# Patient Record
Sex: FEMALE | Race: WHITE | NOT HISPANIC OR LATINO | Employment: OTHER | URBAN - METROPOLITAN AREA
[De-identification: names, ages, dates, MRNs, and addresses within clinical notes are randomized per-mention and may not be internally consistent; named-entity substitution may affect disease eponyms.]

---

## 2017-01-23 ENCOUNTER — GENERIC CONVERSION - ENCOUNTER (OUTPATIENT)
Dept: OTHER | Facility: OTHER | Age: 82
End: 2017-01-23

## 2017-03-08 ENCOUNTER — ALLSCRIPTS OFFICE VISIT (OUTPATIENT)
Dept: OTHER | Facility: OTHER | Age: 82
End: 2017-03-08

## 2017-03-08 DIAGNOSIS — E03.9 HYPOTHYROIDISM: ICD-10-CM

## 2017-03-08 DIAGNOSIS — I73.9 PERIPHERAL VASCULAR DISEASE (HCC): ICD-10-CM

## 2017-03-08 DIAGNOSIS — I10 ESSENTIAL (PRIMARY) HYPERTENSION: ICD-10-CM

## 2017-03-08 DIAGNOSIS — I65.29 OCCLUSION AND STENOSIS OF UNSPECIFIED CAROTID ARTERY: ICD-10-CM

## 2017-03-21 ENCOUNTER — APPOINTMENT (OUTPATIENT)
Dept: LAB | Facility: HOSPITAL | Age: 82
End: 2017-03-21
Payer: MEDICARE

## 2017-03-21 DIAGNOSIS — I65.29 OCCLUSION AND STENOSIS OF UNSPECIFIED CAROTID ARTERY: ICD-10-CM

## 2017-03-21 DIAGNOSIS — E03.9 HYPOTHYROIDISM: ICD-10-CM

## 2017-03-21 DIAGNOSIS — I10 ESSENTIAL (PRIMARY) HYPERTENSION: ICD-10-CM

## 2017-03-21 DIAGNOSIS — I73.9 PERIPHERAL VASCULAR DISEASE (HCC): ICD-10-CM

## 2017-03-21 LAB
ALBUMIN SERPL BCP-MCNC: 3.6 G/DL (ref 3.5–5)
ALP SERPL-CCNC: 87 U/L (ref 46–116)
ALT SERPL W P-5'-P-CCNC: 29 U/L (ref 12–78)
ANION GAP SERPL CALCULATED.3IONS-SCNC: 6 MMOL/L (ref 4–13)
AST SERPL W P-5'-P-CCNC: 25 U/L (ref 5–45)
BASOPHILS # BLD AUTO: 0.02 THOUSANDS/ΜL (ref 0–0.1)
BASOPHILS NFR BLD AUTO: 0 % (ref 0–1)
BILIRUB SERPL-MCNC: 0.29 MG/DL (ref 0.2–1)
BUN SERPL-MCNC: 21 MG/DL (ref 5–25)
CALCIUM SERPL-MCNC: 9 MG/DL (ref 8.3–10.1)
CHLORIDE SERPL-SCNC: 103 MMOL/L (ref 100–108)
CHOLEST SERPL-MCNC: 279 MG/DL (ref 50–200)
CO2 SERPL-SCNC: 31 MMOL/L (ref 21–32)
CREAT SERPL-MCNC: 0.71 MG/DL (ref 0.6–1.3)
EOSINOPHIL # BLD AUTO: 0.14 THOUSAND/ΜL (ref 0–0.61)
EOSINOPHIL NFR BLD AUTO: 3 % (ref 0–6)
ERYTHROCYTE [DISTWIDTH] IN BLOOD BY AUTOMATED COUNT: 13.6 % (ref 11.6–15.1)
GFR SERPL CREATININE-BSD FRML MDRD: >60 ML/MIN/1.73SQ M
GLUCOSE P FAST SERPL-MCNC: 99 MG/DL (ref 65–99)
HCT VFR BLD AUTO: 38.3 % (ref 34.8–46.1)
HDLC SERPL-MCNC: 94 MG/DL (ref 40–60)
HGB BLD-MCNC: 12.4 G/DL (ref 11.5–15.4)
LDLC SERPL CALC-MCNC: 169 MG/DL (ref 0–100)
LYMPHOCYTES # BLD AUTO: 1.14 THOUSANDS/ΜL (ref 0.6–4.47)
LYMPHOCYTES NFR BLD AUTO: 24 % (ref 14–44)
MCH RBC QN AUTO: 29.4 PG (ref 26.8–34.3)
MCHC RBC AUTO-ENTMCNC: 32.4 G/DL (ref 31.4–37.4)
MCV RBC AUTO: 91 FL (ref 82–98)
MONOCYTES # BLD AUTO: 0.34 THOUSAND/ΜL (ref 0.17–1.22)
MONOCYTES NFR BLD AUTO: 7 % (ref 4–12)
NEUTROPHILS # BLD AUTO: 3.07 THOUSANDS/ΜL (ref 1.85–7.62)
NEUTS SEG NFR BLD AUTO: 66 % (ref 43–75)
PLATELET # BLD AUTO: 221 THOUSANDS/UL (ref 149–390)
PMV BLD AUTO: 12.6 FL (ref 8.9–12.7)
POTASSIUM SERPL-SCNC: 4.1 MMOL/L (ref 3.5–5.3)
PROT SERPL-MCNC: 7.5 G/DL (ref 6.4–8.2)
RBC # BLD AUTO: 4.22 MILLION/UL (ref 3.81–5.12)
SODIUM SERPL-SCNC: 140 MMOL/L (ref 136–145)
TRIGL SERPL-MCNC: 79 MG/DL
TSH SERPL DL<=0.05 MIU/L-ACNC: 1.94 UIU/ML (ref 0.36–3.74)
WBC # BLD AUTO: 4.71 THOUSAND/UL (ref 4.31–10.16)

## 2017-03-21 PROCEDURE — 80061 LIPID PANEL: CPT

## 2017-03-21 PROCEDURE — 36415 COLL VENOUS BLD VENIPUNCTURE: CPT

## 2017-03-21 PROCEDURE — 84443 ASSAY THYROID STIM HORMONE: CPT

## 2017-03-21 PROCEDURE — 85025 COMPLETE CBC W/AUTO DIFF WBC: CPT

## 2017-03-21 PROCEDURE — 80053 COMPREHEN METABOLIC PANEL: CPT

## 2017-04-07 ENCOUNTER — GENERIC CONVERSION - ENCOUNTER (OUTPATIENT)
Dept: OTHER | Facility: OTHER | Age: 82
End: 2017-04-07

## 2017-04-07 ENCOUNTER — HOSPITAL ENCOUNTER (OUTPATIENT)
Dept: NON INVASIVE DIAGNOSTICS | Facility: CLINIC | Age: 82
Discharge: HOME | End: 2017-04-07
Payer: MEDICARE

## 2017-04-07 DIAGNOSIS — I73.9 PERIPHERAL VASCULAR DISEASE (HCC): ICD-10-CM

## 2017-04-07 PROCEDURE — 93923 UPR/LXTR ART STDY 3+ LVLS: CPT

## 2017-04-07 PROCEDURE — 93925 LOWER EXTREMITY STUDY: CPT

## 2017-04-11 ENCOUNTER — ALLSCRIPTS OFFICE VISIT (OUTPATIENT)
Dept: OTHER | Facility: OTHER | Age: 82
End: 2017-04-11

## 2017-04-20 ENCOUNTER — GENERIC CONVERSION - ENCOUNTER (OUTPATIENT)
Dept: OTHER | Facility: OTHER | Age: 82
End: 2017-04-20

## 2017-06-20 ENCOUNTER — ALLSCRIPTS OFFICE VISIT (OUTPATIENT)
Dept: OTHER | Facility: OTHER | Age: 82
End: 2017-06-20

## 2017-06-20 ENCOUNTER — TRANSCRIBE ORDERS (OUTPATIENT)
Dept: ADMINISTRATIVE | Facility: HOSPITAL | Age: 82
End: 2017-06-20

## 2017-06-20 DIAGNOSIS — M25.311 INSTABILITY OF RIGHT SHOULDER JOINT: Primary | ICD-10-CM

## 2017-06-20 DIAGNOSIS — M25.311 OTHER INSTABILITY, RIGHT SHOULDER: ICD-10-CM

## 2017-06-27 ENCOUNTER — HOSPITAL ENCOUNTER (OUTPATIENT)
Dept: MRI IMAGING | Facility: HOSPITAL | Age: 82
Discharge: HOME | End: 2017-06-27
Payer: MEDICARE

## 2017-06-27 DIAGNOSIS — M25.311 OTHER INSTABILITY, RIGHT SHOULDER: ICD-10-CM

## 2017-06-27 PROCEDURE — 73221 MRI JOINT UPR EXTREM W/O DYE: CPT

## 2017-06-28 ENCOUNTER — GENERIC CONVERSION - ENCOUNTER (OUTPATIENT)
Dept: OTHER | Facility: OTHER | Age: 82
End: 2017-06-28

## 2017-07-05 ENCOUNTER — ALLSCRIPTS OFFICE VISIT (OUTPATIENT)
Dept: OTHER | Facility: OTHER | Age: 82
End: 2017-07-05

## 2017-07-28 ENCOUNTER — APPOINTMENT (OUTPATIENT)
Dept: RADIOLOGY | Facility: CLINIC | Age: 82
End: 2017-07-28
Payer: MEDICARE

## 2017-07-28 ENCOUNTER — ALLSCRIPTS OFFICE VISIT (OUTPATIENT)
Dept: OTHER | Facility: OTHER | Age: 82
End: 2017-07-28

## 2017-07-28 DIAGNOSIS — M25.511 PAIN IN RIGHT SHOULDER: ICD-10-CM

## 2017-07-28 PROCEDURE — 73030 X-RAY EXAM OF SHOULDER: CPT

## 2017-08-09 ENCOUNTER — APPOINTMENT (OUTPATIENT)
Dept: PHYSICAL THERAPY | Facility: CLINIC | Age: 82
End: 2017-08-09
Payer: MEDICARE

## 2017-08-09 PROCEDURE — 97161 PT EVAL LOW COMPLEX 20 MIN: CPT

## 2017-08-09 PROCEDURE — G8990 OTHER PT/OT CURRENT STATUS: HCPCS

## 2017-08-09 PROCEDURE — G8991 OTHER PT/OT GOAL STATUS: HCPCS

## 2017-08-09 PROCEDURE — 97110 THERAPEUTIC EXERCISES: CPT

## 2017-08-11 ENCOUNTER — APPOINTMENT (OUTPATIENT)
Dept: PHYSICAL THERAPY | Facility: CLINIC | Age: 82
End: 2017-08-11
Payer: MEDICARE

## 2017-08-11 PROCEDURE — 97140 MANUAL THERAPY 1/> REGIONS: CPT

## 2017-08-11 PROCEDURE — 97110 THERAPEUTIC EXERCISES: CPT

## 2017-08-15 ENCOUNTER — APPOINTMENT (OUTPATIENT)
Dept: PHYSICAL THERAPY | Facility: CLINIC | Age: 82
End: 2017-08-15
Payer: MEDICARE

## 2017-08-18 ENCOUNTER — APPOINTMENT (OUTPATIENT)
Dept: PHYSICAL THERAPY | Facility: CLINIC | Age: 82
End: 2017-08-18
Payer: MEDICARE

## 2017-08-21 ENCOUNTER — APPOINTMENT (EMERGENCY)
Dept: RADIOLOGY | Facility: HOSPITAL | Age: 82
End: 2017-08-21
Payer: MEDICARE

## 2017-08-21 ENCOUNTER — APPOINTMENT (EMERGENCY)
Dept: CT IMAGING | Facility: HOSPITAL | Age: 82
End: 2017-08-21
Payer: MEDICARE

## 2017-08-21 ENCOUNTER — HOSPITAL ENCOUNTER (EMERGENCY)
Facility: HOSPITAL | Age: 82
Discharge: HOME | End: 2017-08-21
Attending: EMERGENCY MEDICINE | Admitting: EMERGENCY MEDICINE
Payer: MEDICARE

## 2017-08-21 VITALS
DIASTOLIC BLOOD PRESSURE: 77 MMHG | TEMPERATURE: 98.8 F | WEIGHT: 124 LBS | RESPIRATION RATE: 16 BRPM | HEART RATE: 61 BPM | SYSTOLIC BLOOD PRESSURE: 144 MMHG | OXYGEN SATURATION: 97 %

## 2017-08-21 DIAGNOSIS — S05.11XA ORBITAL CONTUSION, RIGHT, INITIAL ENCOUNTER: ICD-10-CM

## 2017-08-21 DIAGNOSIS — S09.90XA CLOSED HEAD INJURY, INITIAL ENCOUNTER: Primary | ICD-10-CM

## 2017-08-21 PROCEDURE — 99284 EMERGENCY DEPT VISIT MOD MDM: CPT

## 2017-08-21 PROCEDURE — 70450 CT HEAD/BRAIN W/O DYE: CPT

## 2017-08-21 PROCEDURE — 70486 CT MAXILLOFACIAL W/O DYE: CPT

## 2017-08-21 RX ORDER — LEVOTHYROXINE SODIUM 0.05 MG/1
50 TABLET ORAL DAILY
COMMUNITY
End: 2018-01-24 | Stop reason: SDUPTHER

## 2017-08-21 RX ORDER — LATANOPROST 50 UG/ML
1 SOLUTION/ DROPS OPHTHALMIC
COMMUNITY

## 2017-08-21 RX ORDER — LISINOPRIL 20 MG/1
20 TABLET ORAL DAILY
COMMUNITY
End: 2018-01-30 | Stop reason: SDUPTHER

## 2017-08-21 RX ORDER — BRIMONIDINE TARTRATE 2 MG/ML
1 SOLUTION/ DROPS OPHTHALMIC 2 TIMES DAILY
COMMUNITY

## 2017-08-22 ENCOUNTER — APPOINTMENT (OUTPATIENT)
Dept: PHYSICAL THERAPY | Facility: CLINIC | Age: 82
End: 2017-08-22
Payer: MEDICARE

## 2017-08-22 PROCEDURE — 97110 THERAPEUTIC EXERCISES: CPT

## 2017-08-22 PROCEDURE — 97140 MANUAL THERAPY 1/> REGIONS: CPT

## 2017-08-25 ENCOUNTER — APPOINTMENT (OUTPATIENT)
Dept: PHYSICAL THERAPY | Facility: CLINIC | Age: 82
End: 2017-08-25
Payer: MEDICARE

## 2017-08-25 PROCEDURE — 97140 MANUAL THERAPY 1/> REGIONS: CPT

## 2017-08-25 PROCEDURE — 97110 THERAPEUTIC EXERCISES: CPT

## 2017-08-29 ENCOUNTER — APPOINTMENT (OUTPATIENT)
Dept: PHYSICAL THERAPY | Facility: CLINIC | Age: 82
End: 2017-08-29
Payer: MEDICARE

## 2017-08-29 PROCEDURE — 97140 MANUAL THERAPY 1/> REGIONS: CPT

## 2017-08-29 PROCEDURE — 97110 THERAPEUTIC EXERCISES: CPT

## 2017-09-01 ENCOUNTER — APPOINTMENT (OUTPATIENT)
Dept: PHYSICAL THERAPY | Facility: CLINIC | Age: 82
End: 2017-09-01
Payer: MEDICARE

## 2017-09-01 PROCEDURE — 97110 THERAPEUTIC EXERCISES: CPT

## 2017-09-01 PROCEDURE — 97150 GROUP THERAPEUTIC PROCEDURES: CPT

## 2017-09-01 PROCEDURE — 97140 MANUAL THERAPY 1/> REGIONS: CPT

## 2017-09-06 ENCOUNTER — ALLSCRIPTS OFFICE VISIT (OUTPATIENT)
Dept: OTHER | Facility: OTHER | Age: 82
End: 2017-09-06

## 2017-09-07 ENCOUNTER — APPOINTMENT (OUTPATIENT)
Dept: PHYSICAL THERAPY | Facility: CLINIC | Age: 82
End: 2017-09-07
Payer: MEDICARE

## 2017-09-07 PROCEDURE — 97140 MANUAL THERAPY 1/> REGIONS: CPT

## 2017-09-07 PROCEDURE — 97110 THERAPEUTIC EXERCISES: CPT

## 2017-09-08 ENCOUNTER — APPOINTMENT (OUTPATIENT)
Dept: PHYSICAL THERAPY | Facility: CLINIC | Age: 82
End: 2017-09-08
Payer: MEDICARE

## 2017-09-08 PROCEDURE — 97140 MANUAL THERAPY 1/> REGIONS: CPT

## 2017-09-08 PROCEDURE — 97110 THERAPEUTIC EXERCISES: CPT

## 2017-09-11 ENCOUNTER — APPOINTMENT (OUTPATIENT)
Dept: PHYSICAL THERAPY | Facility: CLINIC | Age: 82
End: 2017-09-11
Payer: MEDICARE

## 2017-09-11 PROCEDURE — 97140 MANUAL THERAPY 1/> REGIONS: CPT

## 2017-09-11 PROCEDURE — 97110 THERAPEUTIC EXERCISES: CPT

## 2017-09-13 ENCOUNTER — APPOINTMENT (OUTPATIENT)
Dept: PHYSICAL THERAPY | Facility: CLINIC | Age: 82
End: 2017-09-13
Payer: MEDICARE

## 2017-09-13 ENCOUNTER — GENERIC CONVERSION - ENCOUNTER (OUTPATIENT)
Dept: OTHER | Facility: OTHER | Age: 82
End: 2017-09-13

## 2017-09-13 PROCEDURE — 97140 MANUAL THERAPY 1/> REGIONS: CPT

## 2017-09-13 PROCEDURE — G8991 OTHER PT/OT GOAL STATUS: HCPCS

## 2017-09-13 PROCEDURE — G8990 OTHER PT/OT CURRENT STATUS: HCPCS

## 2017-09-13 PROCEDURE — 97110 THERAPEUTIC EXERCISES: CPT

## 2017-09-20 ENCOUNTER — APPOINTMENT (OUTPATIENT)
Dept: PHYSICAL THERAPY | Facility: CLINIC | Age: 82
End: 2017-09-20
Payer: MEDICARE

## 2017-09-20 PROCEDURE — 97140 MANUAL THERAPY 1/> REGIONS: CPT

## 2017-09-20 PROCEDURE — 97110 THERAPEUTIC EXERCISES: CPT

## 2017-09-22 ENCOUNTER — APPOINTMENT (OUTPATIENT)
Dept: PHYSICAL THERAPY | Facility: CLINIC | Age: 82
End: 2017-09-22
Payer: MEDICARE

## 2017-09-22 PROCEDURE — 97110 THERAPEUTIC EXERCISES: CPT

## 2017-09-22 PROCEDURE — 97140 MANUAL THERAPY 1/> REGIONS: CPT

## 2017-09-27 ENCOUNTER — APPOINTMENT (OUTPATIENT)
Dept: PHYSICAL THERAPY | Facility: CLINIC | Age: 82
End: 2017-09-27
Payer: MEDICARE

## 2017-09-27 PROCEDURE — 97140 MANUAL THERAPY 1/> REGIONS: CPT

## 2017-09-27 PROCEDURE — 97110 THERAPEUTIC EXERCISES: CPT

## 2017-09-29 ENCOUNTER — APPOINTMENT (OUTPATIENT)
Dept: PHYSICAL THERAPY | Facility: CLINIC | Age: 82
End: 2017-09-29
Payer: MEDICARE

## 2017-09-29 PROCEDURE — 97140 MANUAL THERAPY 1/> REGIONS: CPT

## 2017-09-29 PROCEDURE — 97110 THERAPEUTIC EXERCISES: CPT

## 2017-10-04 ENCOUNTER — APPOINTMENT (OUTPATIENT)
Dept: PHYSICAL THERAPY | Facility: CLINIC | Age: 82
End: 2017-10-04
Payer: MEDICARE

## 2017-10-06 ENCOUNTER — APPOINTMENT (OUTPATIENT)
Dept: PHYSICAL THERAPY | Facility: CLINIC | Age: 82
End: 2017-10-06
Payer: MEDICARE

## 2017-10-06 PROCEDURE — 97110 THERAPEUTIC EXERCISES: CPT

## 2017-10-06 PROCEDURE — 97140 MANUAL THERAPY 1/> REGIONS: CPT

## 2017-10-11 ENCOUNTER — APPOINTMENT (OUTPATIENT)
Dept: PHYSICAL THERAPY | Facility: CLINIC | Age: 82
End: 2017-10-11
Payer: MEDICARE

## 2017-10-11 PROCEDURE — 97140 MANUAL THERAPY 1/> REGIONS: CPT

## 2017-10-11 PROCEDURE — 97110 THERAPEUTIC EXERCISES: CPT

## 2017-10-13 ENCOUNTER — APPOINTMENT (OUTPATIENT)
Dept: PHYSICAL THERAPY | Facility: CLINIC | Age: 82
End: 2017-10-13
Payer: MEDICARE

## 2017-10-13 PROCEDURE — 97140 MANUAL THERAPY 1/> REGIONS: CPT

## 2017-10-13 PROCEDURE — G8990 OTHER PT/OT CURRENT STATUS: HCPCS

## 2017-10-13 PROCEDURE — 97110 THERAPEUTIC EXERCISES: CPT

## 2017-10-13 PROCEDURE — G8991 OTHER PT/OT GOAL STATUS: HCPCS

## 2017-10-17 ENCOUNTER — GENERIC CONVERSION - ENCOUNTER (OUTPATIENT)
Dept: OTHER | Facility: OTHER | Age: 82
End: 2017-10-17

## 2017-12-13 ENCOUNTER — GENERIC CONVERSION - ENCOUNTER (OUTPATIENT)
Dept: OTHER | Facility: OTHER | Age: 82
End: 2017-12-13

## 2017-12-13 DIAGNOSIS — E78.5 HYPERLIPIDEMIA: ICD-10-CM

## 2017-12-13 DIAGNOSIS — J45.909 UNCOMPLICATED ASTHMA: ICD-10-CM

## 2017-12-13 DIAGNOSIS — I10 ESSENTIAL (PRIMARY) HYPERTENSION: ICD-10-CM

## 2017-12-13 DIAGNOSIS — M25.311 OTHER INSTABILITY, RIGHT SHOULDER: ICD-10-CM

## 2017-12-13 DIAGNOSIS — E03.9 HYPOTHYROIDISM: ICD-10-CM

## 2018-01-11 NOTE — RESULT NOTES
Verified Results  * MRI SHOULDER RIGHT WO CONTRAST [id number] 07:13PM Arlene Pantoja Order Number: [id number]    - Patient Instructions: To schedule this appointment, please contact Central Scheduling at [phone]  Test Name Result Flag Reference   MRI SHOULDER RIGHT WO CONTRAST (Report)     MRI RIGHT SHOULDER     INDICATION: M25 311: Other instability, right shoulder  History taken directly from the electronic ordering system  Right shoulder pain  COMPARISON: Plain film dated 2/25/2015     TECHNIQUE:  The following MR sequences were obtained of the right shoulder: Localizer, axial GRE/PD fat sat, oblique coronal T2 fat sat, oblique sagittal T1/T2 fat sat  Images were acquired on a 1 5 [place] unit  Gadolinium was not used  FINDINGS: Image quality is markedly degraded due to patient motion  SUBCUTANEOUS TISSUES: Normal     JOINT EFFUSION: Prominent joint effusion present  ACROMION PROCESS: Normal      ROTATOR CUFF: Complete full-thickness supraspinatus and infraspinatus tendon tears with retraction to nearly the level of the glenohumeral joint  No gross evidence of fatty muscular atrophy  Superior humeral head migration noted however suggesting    chronicity  SUBACROMIAL/SUBDELTOID BURSA: Bursal fluid evident  LONG HEAD OF BICEPS TENDON: Limited evaluation of the long head biceps tendon due to image quality degradation from patient motion  Supraglenoid attachment is not identified raising suspicion for tear  GLENOID LABRUM: Degeneration and tearing noted superiorly with otherwise limited evaluation of the labrum  GLENOHUMERAL JOINT: Degenerative change noted with full-thickness superior glenoid cartilage loss and reactive subchondral marrow edema evident  ACROMIOCLAVICULAR JOINT: Degenerative change noted  BONE MARROW SIGNAL: Normal        IMPRESSION:   1  Evaluation quite limited due to image quality degradation from patient motion     2  Complete full-thickness supraspinatus/intraspinous tendon tears exhibiting tendon retraction to nearly the level of the glenohumeral joint  Superior humeral head migration suggest chronicity  3  Limited assessment of the long head biceps tendon which may be torn  4  Glenohumeral degenerative change including full-thickness cartilage loss as well as degeneration and tearing of the superior glenoid labrum  Associated joint effusion         Workstation performed: [id number]     Signed by:   Susan Coyle MD   6/28/17       Signatures   Electronically signed by : DILCIA Gonzalez ; Jun 28 2017  1:26PM EST                       (Author)

## 2018-01-12 VITALS
SYSTOLIC BLOOD PRESSURE: 130 MMHG | WEIGHT: 121.4 LBS | BODY MASS INDEX: 24.48 KG/M2 | DIASTOLIC BLOOD PRESSURE: 62 MMHG | HEIGHT: 59 IN

## 2018-01-12 VITALS
WEIGHT: 124.38 LBS | TEMPERATURE: 97.2 F | HEIGHT: 59 IN | DIASTOLIC BLOOD PRESSURE: 60 MMHG | BODY MASS INDEX: 25.08 KG/M2 | SYSTOLIC BLOOD PRESSURE: 128 MMHG

## 2018-01-12 VITALS
DIASTOLIC BLOOD PRESSURE: 70 MMHG | BODY MASS INDEX: 25.17 KG/M2 | SYSTOLIC BLOOD PRESSURE: 152 MMHG | TEMPERATURE: 96.7 F | WEIGHT: 122.5 LBS

## 2018-01-13 VITALS
BODY MASS INDEX: 25.3 KG/M2 | TEMPERATURE: 96.8 F | HEIGHT: 59 IN | SYSTOLIC BLOOD PRESSURE: 118 MMHG | WEIGHT: 125.5 LBS | DIASTOLIC BLOOD PRESSURE: 70 MMHG

## 2018-01-13 VITALS — HEART RATE: 80 BPM | SYSTOLIC BLOOD PRESSURE: 163 MMHG | DIASTOLIC BLOOD PRESSURE: 71 MMHG

## 2018-01-13 NOTE — RESULT NOTES
Message   Please call patient to discuss that her lower extremity arterial dopplers showed significant stenosis on both sides but stable compared to last study done in december 2015  Patient should make sure to discuss with her vascular surgeon  Thanks  NET     Verified Results  VAS LOWER LIMB ARTERIAL DUPLEX, COMPLETE BILATERAL/GRAFTS [id number] 12:39PM Kelsey Early Order Number: [id number]    - Patient Instructions: To schedule this appointment, please contact Central Scheduling at [phone]  Test Name Result Flag Reference   VAS LOWER LIMB ARTERIAL DUPLEX, COMPLETE BILATERAL/GRAFTS (Report)     [facility] REPORT   CLINICAL:   Indications: PVD, Unspecified [I73 9]  Bilateral lower extremity leg cramps at only at night  Risk Factors: The patient has history of Hyperlipidemia, Hypertension,   previous remote smoking, and Carotid Artery Disease  Operative History   B/L LE angioplasty   Right Brachial Pressure: 132/ mm Hg, Left Brachial Pressure: 141/ mm Hg  FINDINGS:      Segment        Right         Left                        Impression PSV EDV Impression PSV EDV    Common Femoral Artery       133  0       139  0    Prox Profunda            86  0       102  0    Prox SFA - Stent          191  0 50-75%   258  0    Mid SFA - Stent          155  0        80  0    Dist SFA - Stent    >75%    451  27        93  0    Proximal Pop            75  0        92  0    Distal Pop             67  0        88  0    Dist Post Tibial          98  12        82  0    Dist  Ant  Tibial         112  12        54  0             CONCLUSION:   Impression:   Right Lower Limb   A greater than 75% stenosis is noted in the distal portion of the superficial   femoral artery stent at the distal thigh level, otherwise, patent stent  Diffuse   atherosclerotic disease noted throughout the remaining portions of the   femoropopliteal arteries     Ankle Pressure: 119 mm Hg , MARLENE: 0 84, moderate disease range, previous study   0 91  Right Metatarsal Pressure: 119 mm Hg, previous study 110 mm Hg  Right Great Toe Pressure 74 mm Hg , above healing threshold, (prior 94 mm Hg)      Left Lower Limb   A 50-75% stenosis is noted in the proximal portion of the superficial femoral   artery stent at the proximal thigh level, otherwise, patent stent  Diffuse   atherosclerotic disease noted throughout the remaining portions of the   femoropopliteal arteries  Ankle Pressure 150 mm Hg , MARLENE: 1 06, normal range, previous study 1  13  Left Metatarsal Pressure: 109 mm Hg, previous study 121 mm Hg  Left Great Toe Pressure 72 mm Hg , above healing threshold, (prior 98 mm Hg)  In comparison to the study of 12/9/2015, there is no significant interval   change in the disease process        SIGNATURE:   Electronically Signed by: Ad Jacobs MD on 2017-04-07 03:55:57 PM       Signatures   Electronically signed by : DILCIA Villalta ; Apr 7 2017  5:08PM EST                       (Author)

## 2018-01-14 VITALS
BODY MASS INDEX: 26.62 KG/M2 | WEIGHT: 123.38 LBS | DIASTOLIC BLOOD PRESSURE: 70 MMHG | SYSTOLIC BLOOD PRESSURE: 124 MMHG | HEIGHT: 57 IN | TEMPERATURE: 98.8 F

## 2018-01-24 VITALS
BODY MASS INDEX: 25.89 KG/M2 | SYSTOLIC BLOOD PRESSURE: 138 MMHG | DIASTOLIC BLOOD PRESSURE: 70 MMHG | HEIGHT: 57 IN | WEIGHT: 120 LBS | TEMPERATURE: 97.3 F

## 2018-01-24 DIAGNOSIS — E03.9 HYPOTHYROIDISM, UNSPECIFIED TYPE: Primary | ICD-10-CM

## 2018-01-24 RX ORDER — LEVOTHYROXINE SODIUM 0.05 MG/1
50 TABLET ORAL DAILY
Qty: 90 TABLET | Refills: 0 | Status: SHIPPED | OUTPATIENT
Start: 2018-01-24 | End: 2018-01-30 | Stop reason: SDUPTHER

## 2018-01-30 ENCOUNTER — TELEPHONE (OUTPATIENT)
Dept: FAMILY MEDICINE CLINIC | Facility: CLINIC | Age: 83
End: 2018-01-30

## 2018-01-30 DIAGNOSIS — J45.30 MILD PERSISTENT ASTHMA, UNSPECIFIED WHETHER COMPLICATED: ICD-10-CM

## 2018-01-30 DIAGNOSIS — E03.9 HYPOTHYROIDISM, UNSPECIFIED TYPE: ICD-10-CM

## 2018-01-30 DIAGNOSIS — I10 HYPERTENSION, UNSPECIFIED TYPE: Primary | ICD-10-CM

## 2018-01-30 RX ORDER — ALBUTEROL SULFATE 90 UG/1
2 AEROSOL, METERED RESPIRATORY (INHALATION) AS NEEDED
Qty: 1 INHALER | Refills: 2 | Status: SHIPPED | OUTPATIENT
Start: 2018-01-30 | End: 2018-04-13 | Stop reason: SDUPTHER

## 2018-01-30 RX ORDER — AMLODIPINE BESYLATE 5 MG/1
5 TABLET ORAL DAILY
Qty: 90 TABLET | Refills: 1 | Status: SHIPPED | OUTPATIENT
Start: 2018-01-30 | End: 2018-10-23 | Stop reason: SDUPTHER

## 2018-01-30 RX ORDER — LISINOPRIL 20 MG/1
20 TABLET ORAL DAILY
Qty: 90 TABLET | Refills: 1 | Status: SHIPPED | OUTPATIENT
Start: 2018-01-30 | End: 2018-04-13 | Stop reason: SDUPTHER

## 2018-01-30 RX ORDER — AMLODIPINE BESYLATE 5 MG/1
TABLET ORAL
COMMUNITY
Start: 2017-12-02 | End: 2018-01-30 | Stop reason: SDUPTHER

## 2018-01-30 RX ORDER — LEVOTHYROXINE SODIUM 0.05 MG/1
50 TABLET ORAL DAILY
Qty: 90 TABLET | Refills: 1 | Status: SHIPPED | OUTPATIENT
Start: 2018-01-30 | End: 2018-04-13 | Stop reason: SDUPTHER

## 2018-03-06 ENCOUNTER — TRANSCRIBE ORDERS (OUTPATIENT)
Dept: ADMINISTRATIVE | Facility: HOSPITAL | Age: 83
End: 2018-03-06

## 2018-03-06 ENCOUNTER — APPOINTMENT (OUTPATIENT)
Dept: LAB | Facility: MEDICAL CENTER | Age: 83
End: 2018-03-06
Payer: MEDICARE

## 2018-03-06 DIAGNOSIS — I10 ESSENTIAL (PRIMARY) HYPERTENSION: ICD-10-CM

## 2018-03-06 DIAGNOSIS — J45.909 UNCOMPLICATED ASTHMA: ICD-10-CM

## 2018-03-06 DIAGNOSIS — M25.311 OTHER INSTABILITY, RIGHT SHOULDER: ICD-10-CM

## 2018-03-06 DIAGNOSIS — E78.5 HYPERLIPIDEMIA: ICD-10-CM

## 2018-03-06 DIAGNOSIS — E03.9 HYPOTHYROIDISM: ICD-10-CM

## 2018-03-06 LAB
ALBUMIN SERPL BCP-MCNC: 3.8 G/DL (ref 3.5–5)
ALP SERPL-CCNC: 80 U/L (ref 46–116)
ALT SERPL W P-5'-P-CCNC: 31 U/L (ref 12–78)
ANION GAP SERPL CALCULATED.3IONS-SCNC: 5 MMOL/L (ref 4–13)
AST SERPL W P-5'-P-CCNC: 25 U/L (ref 5–45)
BASOPHILS # BLD AUTO: 0.01 THOUSANDS/ΜL (ref 0–0.1)
BASOPHILS NFR BLD AUTO: 0 % (ref 0–1)
BILIRUB SERPL-MCNC: 0.43 MG/DL (ref 0.2–1)
BUN SERPL-MCNC: 22 MG/DL (ref 5–25)
CALCIUM SERPL-MCNC: 8.9 MG/DL (ref 8.3–10.1)
CHLORIDE SERPL-SCNC: 102 MMOL/L (ref 100–108)
CHOLEST SERPL-MCNC: 279 MG/DL (ref 50–200)
CO2 SERPL-SCNC: 29 MMOL/L (ref 21–32)
CREAT SERPL-MCNC: 0.65 MG/DL (ref 0.6–1.3)
EOSINOPHIL # BLD AUTO: 0.14 THOUSAND/ΜL (ref 0–0.61)
EOSINOPHIL NFR BLD AUTO: 3 % (ref 0–6)
ERYTHROCYTE [DISTWIDTH] IN BLOOD BY AUTOMATED COUNT: 14.2 % (ref 11.6–15.1)
GFR SERPL CREATININE-BSD FRML MDRD: 80 ML/MIN/1.73SQ M
GLUCOSE P FAST SERPL-MCNC: 83 MG/DL (ref 65–99)
HCT VFR BLD AUTO: 36.8 % (ref 34.8–46.1)
HDLC SERPL-MCNC: 93 MG/DL (ref 40–60)
HGB BLD-MCNC: 12 G/DL (ref 11.5–15.4)
LDLC SERPL CALC-MCNC: 169 MG/DL (ref 0–100)
LYMPHOCYTES # BLD AUTO: 1.42 THOUSANDS/ΜL (ref 0.6–4.47)
LYMPHOCYTES NFR BLD AUTO: 27 % (ref 14–44)
MCH RBC QN AUTO: 29.3 PG (ref 26.8–34.3)
MCHC RBC AUTO-ENTMCNC: 32.6 G/DL (ref 31.4–37.4)
MCV RBC AUTO: 90 FL (ref 82–98)
MONOCYTES # BLD AUTO: 0.4 THOUSAND/ΜL (ref 0.17–1.22)
MONOCYTES NFR BLD AUTO: 8 % (ref 4–12)
NEUTROPHILS # BLD AUTO: 3.3 THOUSANDS/ΜL (ref 1.85–7.62)
NEUTS SEG NFR BLD AUTO: 62 % (ref 43–75)
NRBC BLD AUTO-RTO: 0 /100 WBCS
PLATELET # BLD AUTO: 245 THOUSANDS/UL (ref 149–390)
PMV BLD AUTO: 12.4 FL (ref 8.9–12.7)
POTASSIUM SERPL-SCNC: 4.1 MMOL/L (ref 3.5–5.3)
PROT SERPL-MCNC: 7.4 G/DL (ref 6.4–8.2)
RBC # BLD AUTO: 4.09 MILLION/UL (ref 3.81–5.12)
SODIUM SERPL-SCNC: 136 MMOL/L (ref 136–145)
TRIGL SERPL-MCNC: 85 MG/DL
TSH SERPL DL<=0.05 MIU/L-ACNC: 2.97 UIU/ML (ref 0.36–3.74)
WBC # BLD AUTO: 5.27 THOUSAND/UL (ref 4.31–10.16)

## 2018-03-06 PROCEDURE — 36415 COLL VENOUS BLD VENIPUNCTURE: CPT

## 2018-03-06 PROCEDURE — 84443 ASSAY THYROID STIM HORMONE: CPT

## 2018-03-06 PROCEDURE — 80061 LIPID PANEL: CPT

## 2018-03-06 PROCEDURE — 85025 COMPLETE CBC W/AUTO DIFF WBC: CPT

## 2018-03-06 PROCEDURE — 80053 COMPREHEN METABOLIC PANEL: CPT

## 2018-03-07 NOTE — PROGRESS NOTES
History of Present Illness    Revaccination   Vaccine Information: Vaccine(s) Given (names): Stodavion 79 with patient regarding vaccine out of temperature range  Action(s): Pt will be revaccinated  Appointment scheduled: [id number]  Other Information: Pt is coming in for revaccination on 12/22/16 at 11:15am  RG 12/12/16  Revaccination Completed: [id number]  Active Problems    1  Acute gastritis (535 00) (K29 00)   2  Ambulatory dysfunction (719 7) (R26 2)   3  Anemia (285 9) (D64 9)   4  Asthma (493 90) (J45 909)   5  Balance disorder (781 99) (R26 89)   6  Carotid atherosclerosis, unspecified laterality (433 10) (I65 29)   7  Cellulitis of foot (682 7) (L03 119)   8  Chronic hoarseness (784 42) (R49 0)   9  Cramp of both lower extremities (729 82) (R25 2)   10  Disc degeneration, lumbar (722 52) (M51 36)   11  GERD without esophagitis (530 81) (K21 9)   12  Glaucoma (365 9) (H40 9)   13  Hypertension (401 9) (I10)   14  Hypothyroidism (244 9) (E03 9)   15  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)   16  Lower back pain (724 2) (M54 5)   17  Lumbar canal stenosis (724 02) (M48 06)   18  Lumbosacral radiculopathy (724 4) (M54 17)   19  Need for revaccination (V05 9) (Z23)   20  Peripheral neuropathy (356 9) (G62 9)   21  Peripheral vascular disease (443 9) (I73 9)   22  Plantar fasciitis (728 71) (M72 2)   23  Scalp laceration (873 0) (S01 01XA)   24  Sciatica (724 3) (M54 30)   25  Sciatica (724 3) (M54 30)   26  Shoulder pain, right (719 41) (M25 511)   27  Skin lesion (709 9) (L98 9)   28  Spondylosis of lumbar region without myelopathy or radiculopathy (721 3) (M47 816)   29  Status post fall (V15 88) (Z91 81)   30  Tingling (782 0) (R20 2)   31  Trigger middle finger of left hand (727 03) (M65 332)   32   Urinary incontinence (788 30) (R32)    Immunizations  Influenza --- Tran Likes: 27-Oct-2016   PCV --- Tran Likes: 10-Apr-2014   PPSV --- Tran Likes: 03-Jan-2008     Current Meds   1  Advair Diskus 250-50 MCG/DOSE Inhalation Aerosol Powder Breath Activated; inhale 1   dose by mouth twice a day   2  Alphagan P 0 1 % Ophthalmic Solution   3  AmLODIPine Besylate 5 MG Oral Tablet; take 1 tablet by mouth once daily for blood   pressure   4  Amoxicillin 500 MG Oral Capsule; TAKE 1 CAPSULE 3 times daily   5  Aspirin 81 MG TABS; TAKE 1 TABLET DAILY   6  Calcium 500 MG TABS; TAKE 1 TABLET DAILY   7  EQL Omega 3 Fish Oil 1200 MG Oral Capsule; TAKE AS DIRECTED   8  Latanoprost 0 005 % Ophthalmic Solution   9  Levothyroxine Sodium 50 MCG Oral Tablet; take 1 tablet by mouth once daily as directed   10  Lisinopril 10 MG Oral Tablet; TAKE 1 TABLET TWICE DAILY   11  Ventolin  (90 Base) MCG/ACT Inhalation Aerosol Solution; INHALE 1 TO 2 PUFFS    EVERY 4 TO 6 HOURS AS NEEDED    Allergies    1  Cephalexin TABS   2  Bactrim TABS   3  Erythromycin TABS   4  Feldene CAPS   5  Pyridium TABS    Future Appointments    Date/Time Provider Specialty Site   03/08/2017 12:30 PM DILCIA Avendaño   [address] [facility] [address] [facility]   12/22/2016 11:15 AM Nurse Dorys Yin  [facility] [address]     Signatures   Electronically signed by : Sonu Dyer OM; Dec 12 2016  6:48PM EST                       (Author)    Electronically signed by : Quinten Mckeon DO; Dec 22 2016  1:19PM EST                       (Author)

## 2018-03-19 ENCOUNTER — TELEPHONE (OUTPATIENT)
Dept: FAMILY MEDICINE CLINIC | Facility: CLINIC | Age: 83
End: 2018-03-19

## 2018-03-19 NOTE — TELEPHONE ENCOUNTER
Spoke with patient that her mediation was approved and left a VM with the pharmacy at [facility] today

## 2018-04-09 RX ORDER — CALCIUM CARBONATE 500(1250)
1 TABLET ORAL DAILY
COMMUNITY
Start: 2016-05-03 | End: 2019-01-03

## 2018-04-09 RX ORDER — DIAPER,BRIEF,YOUTH,DISPOSABLE
EACH MISCELLANEOUS
COMMUNITY
Start: 2013-11-13 | End: 2022-05-18

## 2018-04-09 RX ORDER — TRAMADOL HYDROCHLORIDE 50 MG/1
1 TABLET ORAL
COMMUNITY
Start: 2017-06-20 | End: 2018-07-02

## 2018-04-12 ENCOUNTER — TRANSITIONAL CARE MANAGEMENT (OUTPATIENT)
Dept: FAMILY MEDICINE CLINIC | Facility: CLINIC | Age: 83
End: 2018-04-12

## 2018-04-12 NOTE — PROGRESS NOTES
HPI:  Judy Pop is a 80 y o  female here for her Subsequent Wellness Visit  There is no problem list on file for this patient  Past Medical History:   Diagnosis Date    Anxiety     Asthma     Glaucoma     HLD (hyperlipidemia)     Hypertension     Thyroid disease      Past Surgical History:   Procedure Laterality Date    CATARACT EXTRACTION      COLONOSCOPY W/ POLYPECTOMY      via Colostomy; Pt does not have colostomy - no listing for a polypectomy of colon per [employer]    DILATION AND CURETTAGE OF UTERUS      NASAL POLYP EXCISION      Nasal Endoscopy Polypectomy    OTHER SURGICAL HISTORY  04/2007    Uterine prolapse and repair      Family History   Problem Relation Age of Onset    Other Family      Back Pain     Hypertension Family     Thyroid disease Family      Disorder     History   Smoking Status    Never Smoker   Smokeless Tobacco    Not on file     History   Alcohol Use No      History   Drug Use No     There were no vitals taken for this visit        Current Outpatient Prescriptions   Medication Sig Dispense Refill    aspirin 81 MG tablet Take 1 tablet by mouth daily      Calcium 500 MG tablet Take 1 tablet by mouth daily      Omega-3 Fatty Acids (EQL OMEGA 3 FISH OIL) 1200 MG CAPS Take by mouth      traMADol (ULTRAM) 50 mg tablet Take 1 tablet by mouth      albuterol (VENTOLIN HFA) 90 mcg/act inhaler Inhale 2 puffs as needed for wheezing 1 Inhaler 2    amLODIPine (NORVASC) 5 mg tablet Take 1 tablet (5 mg total) by mouth daily 90 tablet 1    aspirin 81 MG tablet Take 81 mg by mouth daily      brimonidine tartrate 0 2 % ophthalmic solution 1 drop 2 (two) times a day      fluticasone-salmeterol (ADVAIR) 250-50 mcg/dose inhaler Inhale 1 puff daily 2 Inhaler 2    latanoprost (XALATAN) 0 005 % ophthalmic solution 1 drop daily at bedtime      levothyroxine 50 mcg tablet Take 1 tablet (50 mcg total) by mouth daily 90 tablet 1    lisinopril (ZESTRIL) 20 mg tablet Take 1 tablet (20 mg total) by mouth daily 90 tablet 1     No current facility-administered medications for this visit        Allergies   Allergen Reactions    Cephalexin Nausea Only    Erythromycin     Phenazopyridine     Piroxicam     Sulfamethoxazole-Trimethoprim      Immunization History   Administered Date(s) Administered    Influenza Split High Dose Preservative Free IM 10/27/2016    Pneumococcal Conjugate 13-Valent 04/10/2014, 12/22/2016    Pneumococcal Polysaccharide PPV23 04/15/1996, 01/03/2008       Patient Care Team:  Eduardo Anderson MD as PCP - General  Chacho Phillips, DO      Medicare Screening Tests and Risk Assessments:  AW Clinical     ISAR:   Previous hospitalizations?:  No       Once in a Lifetime Medicare Screening:       Medicare Screening Tests and Risk Assessment:   AAA Risk Assessment    Osteoporosis Risk Assessment    HIV Risk Assessment        Drug and Alcohol Use:   Tobacco use    Cigarettes:  never smoker    Tobacco use duration    Tobacco Cessation Readiness    Alcohol use    Alcohol use:  never    Alcohol Treatment Readiness   Illicit Drug Use    Drug use:  never        Diet & Exercise:   Diet   What is your diet?:  Regular   How many servings a day of the following:   Exercise    Do you currently exercise?:  yes    Frequency:  occasional    Type of exercise:  walking       Cognitive Impairment Screening:   Cognitive Impairment Screening    Do you have difficulty learning or retaining new information?:  No Do you have difficulty handling new tasks?:  No   Do you have difficulty with reasoning?:  No Do you have difficulty with spatial ability and orientation?:  No   Do you have difficulty with language?:  No Do you have difficulty with behavior?:  No       Functional Ability/Level of Safety:   Hearing    Hearing difficulties:  No Bilateral:  normal   Left:  normal Right:  normal   Hearing aid:  No    Hearing Impairment Assessment    Hearing status:  No impairment   Current Activities    Help needed with the folllowing:    Using the phone:  No Transportation:  No   Shopping:  No Preparing Meals:  No   Doing Housework:  No Doing Laundry:  No   Managing Medications:  No Managing Money:  No   ADL    Fall Risk   Have you fallen in the last 12 months?:  Yes Are you unsteady on your feet?:  No   How many times?:  2 Are you taking any medications that may cause fatigue or dizziness?:  No    Do you rush to the bathroom potentially risking a fall?:  No   Injury History       Home Safety:   Home Safety Risk Factors   Unfamilar with surroundings:  No Uneven floors:  No   Stairs or handrail saftey risk:  No Loose rugs:  No   Household clutter:  No Poor household lighting:  No   No grab bars in bathroom:  No Further evaluation needed:  No       Advanced Directives:   Advanced Directives    Living Will:  Yes Durable POA for healthcare: Yes   Advanced directive:  Yes    Patient's End of Life Decisions        Urinary Incontinence:   Do you have urinary incontinence?:  No Do you have incomplete emptying?:  No   Do you urinate frequently?:  No Do you have urinary urgency?:  No   Do you have urinary hesitancy?:  No Do you have dysuria (painful and/or difficult urination)?:  No   Do you have nocturia (waking up to urinate)?:  No Do you strain when urinating (have to push to urinate)?:  No   Do you have a weak stream when urinating?:  No Do you have intermittent streaming when urinating?:  No   Do you dribble urine after finishing?:  No    Do you have vaginal pressure?:  No Do you have vaginal dryness?:  No       Glaucoma:            Provider Screening    No data filed        No exam data present  Reviewed Updated [facility] Prior Wellness Visits:   Last Medicare wellness visit information was reviewed, patient interviewed , no change since last AWVyes  Last Medicare wellness visit information was reviewed, patient interviewed and updates made to the record today no    Assessment and Plan:  No diagnosis found      Health Maintenance Due   Topic Date Due    Depression Screening PHQ-9  08/14/1942    Fall Risk  08/14/1995    Urinary Incontinence Screening  08/14/1995    GLAUCOMA SCREENING 67+ YR  08/14/1997

## 2018-04-13 ENCOUNTER — OFFICE VISIT (OUTPATIENT)
Dept: FAMILY MEDICINE CLINIC | Facility: CLINIC | Age: 83
End: 2018-04-13
Payer: MEDICARE

## 2018-04-13 VITALS
HEIGHT: 58 IN | DIASTOLIC BLOOD PRESSURE: 80 MMHG | BODY MASS INDEX: 26.11 KG/M2 | SYSTOLIC BLOOD PRESSURE: 148 MMHG | OXYGEN SATURATION: 95 % | HEART RATE: 83 BPM | WEIGHT: 124.4 LBS

## 2018-04-13 DIAGNOSIS — J45.40 MODERATE PERSISTENT ASTHMA WITHOUT COMPLICATION: ICD-10-CM

## 2018-04-13 DIAGNOSIS — I10 HYPERTENSION, UNSPECIFIED TYPE: ICD-10-CM

## 2018-04-13 DIAGNOSIS — M75.112 INCOMPLETE TEAR OF LEFT ROTATOR CUFF: Primary | ICD-10-CM

## 2018-04-13 DIAGNOSIS — E03.9 HYPOTHYROIDISM, UNSPECIFIED TYPE: ICD-10-CM

## 2018-04-13 DIAGNOSIS — J45.30 MILD PERSISTENT ASTHMA, UNSPECIFIED WHETHER COMPLICATED: ICD-10-CM

## 2018-04-13 PROBLEM — M65.332 TRIGGER MIDDLE FINGER OF LEFT HAND: Status: ACTIVE | Noted: 2018-04-13

## 2018-04-13 PROBLEM — I70.213 ATHEROSCLEROSIS OF NATIVE ARTERY OF BOTH LOWER EXTREMITIES WITH INTERMITTENT CLAUDICATION (HCC): Status: ACTIVE | Noted: 2017-04-11

## 2018-04-13 PROBLEM — E78.5 HYPERLIPIDEMIA: Status: ACTIVE | Noted: 2017-04-11

## 2018-04-13 PROBLEM — M75.121 COMPLETE TEAR OF RIGHT ROTATOR CUFF: Status: ACTIVE | Noted: 2017-06-28

## 2018-04-13 PROCEDURE — 99214 OFFICE O/P EST MOD 30 MIN: CPT | Performed by: FAMILY MEDICINE

## 2018-04-13 RX ORDER — PREDNISONE 10 MG/1
10 TABLET ORAL DAILY
Qty: 45 TABLET | Refills: 0 | Status: SHIPPED | OUTPATIENT
Start: 2018-04-13 | End: 2018-07-02

## 2018-04-13 RX ORDER — LEVOTHYROXINE SODIUM 0.05 MG/1
50 TABLET ORAL DAILY
Qty: 90 TABLET | Refills: 1 | Status: SHIPPED | OUTPATIENT
Start: 2018-04-13 | End: 2018-07-02 | Stop reason: SDUPTHER

## 2018-04-13 RX ORDER — ALBUTEROL SULFATE 90 UG/1
2 AEROSOL, METERED RESPIRATORY (INHALATION) AS NEEDED
Qty: 1 INHALER | Refills: 1 | Status: SHIPPED | OUTPATIENT
Start: 2018-04-13 | End: 2019-02-08 | Stop reason: SDUPTHER

## 2018-04-13 RX ORDER — LISINOPRIL 20 MG/1
20 TABLET ORAL DAILY
Qty: 90 TABLET | Refills: 1 | Status: SHIPPED | OUTPATIENT
Start: 2018-04-13 | End: 2018-07-02 | Stop reason: SDUPTHER

## 2018-04-13 NOTE — PROGRESS NOTES
Assessment/Plan:    Labs reviewed with patient  Patient does not wish to start statin medication  Handicap placard given       Diagnoses and all orders for this visit:    Incomplete tear of left rotator cuff  -     predniSONE 10 mg tablet; Take 1 tablet (10 mg total) by mouth daily The patient will use 5 tablets daily with food  For 3 days then 4 tablets daily for 3 days and 3 tablets daily for 3 days then 2 tablets daily for 3 days then 1 tablet daily for 3 days  Moderate persistent asthma without complication    Hypothyroidism, unspecified type  -     fluticasone-salmeterol (ADVAIR) 250-50 mcg/dose inhaler; Inhale 1 puff 2 (two) times a day  -     levothyroxine 50 mcg tablet; Take 1 tablet (50 mcg total) by mouth daily  -     lisinopril (ZESTRIL) 20 mg tablet; Take 1 tablet (20 mg total) by mouth daily    Hypertension, unspecified type  -     fluticasone-salmeterol (ADVAIR) 250-50 mcg/dose inhaler; Inhale 1 puff 2 (two) times a day  -     lisinopril (ZESTRIL) 20 mg tablet; Take 1 tablet (20 mg total) by mouth daily    Mild persistent asthma, unspecified whether complicated  -     fluticasone-salmeterol (ADVAIR) 250-50 mcg/dose inhaler; Inhale 1 puff 2 (two) times a day  -     lisinopril (ZESTRIL) 20 mg tablet; Take 1 tablet (20 mg total) by mouth daily    Other orders  -     traMADol (ULTRAM) 50 mg tablet; Take 1 tablet by mouth  -     Omega-3 Fatty Acids (EQL OMEGA 3 FISH OIL) 1200 MG CAPS; Take by mouth  -     Calcium 500 MG tablet; Take 1 tablet by mouth daily  -     aspirin 81 MG tablet; Take 1 tablet by mouth daily          Subjective:      Patient ID: Erna Cook is a 80 y o  female  Patient is here with left shoulder pain which is worsening overall  Patient was trying to patient up on the tourniquet signal 1 pain into  No chest pain or shortness of breath  Patient also having triggering of her fingers left hand          The following portions of the patient's history were reviewed and updated as appropriate: allergies, current medications, past family history, past medical history, past social history, past surgical history and problem list     Review of Systems      Objective:      /80 (BP Location: Right arm, Patient Position: Sitting, Cuff Size: Standard)   Pulse 83   Ht 4' 9 5" (1 461 m)   Wt 56 4 kg (124 lb 6 4 oz)   SpO2 95%   Breastfeeding?  No   BMI 26 45 kg/m²          Physical Exam

## 2018-07-02 ENCOUNTER — OFFICE VISIT (OUTPATIENT)
Dept: FAMILY MEDICINE CLINIC | Facility: CLINIC | Age: 83
End: 2018-07-02
Payer: MEDICARE

## 2018-07-02 VITALS
SYSTOLIC BLOOD PRESSURE: 146 MMHG | BODY MASS INDEX: 26.49 KG/M2 | DIASTOLIC BLOOD PRESSURE: 76 MMHG | HEIGHT: 58 IN | WEIGHT: 126.2 LBS

## 2018-07-02 DIAGNOSIS — J45.30 MILD PERSISTENT ASTHMA, UNSPECIFIED WHETHER COMPLICATED: ICD-10-CM

## 2018-07-02 DIAGNOSIS — K59.01 SLOW TRANSIT CONSTIPATION: ICD-10-CM

## 2018-07-02 DIAGNOSIS — E03.9 HYPOTHYROIDISM, UNSPECIFIED TYPE: Primary | ICD-10-CM

## 2018-07-02 DIAGNOSIS — Z12.11 SCREENING FOR COLON CANCER: ICD-10-CM

## 2018-07-02 DIAGNOSIS — I10 HYPERTENSION, UNSPECIFIED TYPE: ICD-10-CM

## 2018-07-02 PROCEDURE — 99214 OFFICE O/P EST MOD 30 MIN: CPT | Performed by: FAMILY MEDICINE

## 2018-07-02 RX ORDER — LEVOTHYROXINE SODIUM 0.05 MG/1
50 TABLET ORAL DAILY
Qty: 90 TABLET | Refills: 2 | Status: SHIPPED | OUTPATIENT
Start: 2018-07-02 | End: 2018-10-23 | Stop reason: SDUPTHER

## 2018-07-02 RX ORDER — LISINOPRIL 20 MG/1
20 TABLET ORAL DAILY
Qty: 90 TABLET | Refills: 2 | Status: SHIPPED | OUTPATIENT
Start: 2018-07-02 | End: 2018-10-23 | Stop reason: SDUPTHER

## 2018-07-02 NOTE — PROGRESS NOTES
Assessment/Plan:   thyroid stable overall  Refills given  Asthma stable overall  Refills on Advair given also  Blood pressure borderline  Will review the assess at follow-up visit continue with current regimen lisinopril refill given  Patient will go for: Guard  Patient will try to increase fluids regarding constipation  Patient will also use fiber       Diagnoses and all orders for this visit:    Hypothyroidism, unspecified type  -     lisinopril (ZESTRIL) 20 mg tablet; Take 1 tablet (20 mg total) by mouth daily  -     levothyroxine 50 mcg tablet; Take 1 tablet (50 mcg total) by mouth daily  -     fluticasone-salmeterol (ADVAIR) 250-50 mcg/dose inhaler; Inhale 1 puff 2 (two) times a day    Hypertension, unspecified type  -     lisinopril (ZESTRIL) 20 mg tablet; Take 1 tablet (20 mg total) by mouth daily  -     fluticasone-salmeterol (ADVAIR) 250-50 mcg/dose inhaler; Inhale 1 puff 2 (two) times a day    Mild persistent asthma, unspecified whether complicated  -     lisinopril (ZESTRIL) 20 mg tablet; Take 1 tablet (20 mg total) by mouth daily  -     fluticasone-salmeterol (ADVAIR) 250-50 mcg/dose inhaler; Inhale 1 puff 2 (two) times a day    Slow transit constipation          Subjective:      Patient ID: Parth Ch is a 80 y o  female  Patient follow-up on hypothyroidism hypertension hyperlipidemia asthma  No asthmatic issues at this time  No chest pain or shortness of breath  Patient does have some urinary incontinence  The patient with some constipation intermittently  All other review of systems negative  The following portions of the patient's history were reviewed and updated as appropriate: allergies, current medications, past family history, past medical history, past social history, past surgical history and problem list     Review of Systems   Constitutional: Negative  HENT: Negative  Eyes: Negative  Respiratory: Negative  Cardiovascular: Negative      Gastrointestinal: Positive for constipation  Endocrine: Negative  Genitourinary: Negative  Musculoskeletal: Negative  Skin: Negative  Allergic/Immunologic: Negative  Neurological: Negative  Hematological: Negative  Psychiatric/Behavioral: Negative  Objective:      /76 (BP Location: Right arm, Patient Position: Sitting, Cuff Size: Adult)   Ht 4' 9 5" (1 461 m)   Wt 57 2 kg (126 lb 3 2 oz)   BMI 26 84 kg/m²          Physical Exam   Constitutional: She is oriented to person, place, and time  She appears well-developed and well-nourished  No distress  HENT:   Head: Normocephalic  Right Ear: External ear normal    Left Ear: External ear normal    Mouth/Throat: Oropharynx is clear and moist  No oropharyngeal exudate  Eyes: EOM are normal  Pupils are equal, round, and reactive to light  Right eye exhibits no discharge  Left eye exhibits no discharge  No scleral icterus  Neck: Normal range of motion  Neck supple  No thyromegaly present  Cardiovascular: Normal rate, regular rhythm, normal heart sounds and intact distal pulses  Exam reveals no gallop and no friction rub  No murmur heard  Pulmonary/Chest: Effort normal and breath sounds normal  No respiratory distress  She has no wheezes  She has no rales  She exhibits no tenderness  Abdominal: Soft  Bowel sounds are normal  She exhibits no distension  There is no tenderness  There is no rebound and no guarding  Musculoskeletal: Normal range of motion  She exhibits no edema or tenderness  Lymphadenopathy:     She has no cervical adenopathy  Neurological: She is oriented to person, place, and time  No cranial nerve deficit  She exhibits normal muscle tone  Coordination normal    Skin: Skin is warm and dry  No rash noted  She is not diaphoretic  No erythema  No pallor  Psychiatric: She has a normal mood and affect  Her behavior is normal  Judgment and thought content normal    Nursing note and vitals reviewed

## 2018-07-31 ENCOUNTER — LAB REQUISITION (OUTPATIENT)
Dept: LAB | Facility: HOSPITAL | Age: 83
End: 2018-07-31
Payer: MEDICARE

## 2018-07-31 DIAGNOSIS — Z12.11 ENCOUNTER FOR SCREENING FOR MALIGNANT NEOPLASM OF COLON: ICD-10-CM

## 2018-07-31 LAB — HEMOCCULT STL QL IA: NEGATIVE

## 2018-07-31 PROCEDURE — G0328 FECAL BLOOD SCRN IMMUNOASSAY: HCPCS | Performed by: FAMILY MEDICINE

## 2018-08-21 ENCOUNTER — TELEPHONE (OUTPATIENT)
Dept: FAMILY MEDICINE CLINIC | Facility: CLINIC | Age: 83
End: 2018-08-21

## 2018-09-01 ENCOUNTER — HOSPITAL ENCOUNTER (EMERGENCY)
Facility: HOSPITAL | Age: 83
Discharge: HOME | End: 2018-09-01
Attending: EMERGENCY MEDICINE | Admitting: EMERGENCY MEDICINE
Payer: MEDICARE

## 2018-09-01 ENCOUNTER — APPOINTMENT (EMERGENCY)
Dept: RADIOLOGY | Facility: HOSPITAL | Age: 83
End: 2018-09-01
Payer: MEDICARE

## 2018-09-01 ENCOUNTER — APPOINTMENT (EMERGENCY)
Dept: CT IMAGING | Facility: HOSPITAL | Age: 83
End: 2018-09-01
Payer: MEDICARE

## 2018-09-01 VITALS
OXYGEN SATURATION: 97 % | HEART RATE: 63 BPM | RESPIRATION RATE: 18 BRPM | TEMPERATURE: 98.9 F | SYSTOLIC BLOOD PRESSURE: 136 MMHG | DIASTOLIC BLOOD PRESSURE: 61 MMHG

## 2018-09-01 DIAGNOSIS — S62.609A FINGER FRACTURE, LEFT: ICD-10-CM

## 2018-09-01 DIAGNOSIS — S60.419A ABRASION, FINGER W/O INFECTION: ICD-10-CM
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
## 2018-09-01 DIAGNOSIS — S50.311A ABRASION OF RIGHT ELBOW: ICD-10-CM

## 2018-09-01 PROCEDURE — 70450 CT HEAD/BRAIN W/O DYE: CPT

## 2018-09-01 PROCEDURE — 73080 X-RAY EXAM OF ELBOW: CPT

## 2018-09-01 PROCEDURE — 72125 CT NECK SPINE W/O DYE: CPT

## 2018-09-01 PROCEDURE — 73130 X-RAY EXAM OF HAND: CPT

## 2018-09-01 PROCEDURE — 99284 EMERGENCY DEPT VISIT MOD MDM: CPT

## 2018-09-01 NOTE — ED PROVIDER NOTES
History  Chief Complaint   Patient presents with    Fall     fell yesterday while going up stairs into home on concrete, struck right side of head and right elbow  no LOC       History provided by:  Patient   used: No    Fall   Mechanism of injury: fall    Injury location:  Head/neck, hand and shoulder/arm  Head/neck injury location:  Head, L neck and R neck  Shoulder/arm injury location:  R elbow  Hand injury location:  L hand  Incident location: Steps at home  Time since incident:  1 day  Arrived directly from scene: no    Fall:     Fall occurred: While walking up the steps  Height of fall:  Essentially ground level  Suspicion of alcohol use: no    Suspicion of drug use: no    Tetanus status:  Unknown (She thinks she is up-to-date)  Associated symptoms: neck pain    Associated symptoms: no abdominal pain, no back pain, no chest pain, no difficulty breathing, no headaches, no loss of consciousness, no nausea and no vomiting    Risk factors: no anticoagulation therapy    Risk factors comment:  She does take a daily aspirin    She hit the right side of her head and has no significant headache and there was no loss of consciousness, no nausea vomiting  She also injured her left hand and right elbow both of which are ecchymotic and have some small abrasions  She denies other injuries  Does have some bilateral neck pain  No back pain  No chest or abdominal pain  Prior to Admission Medications   Prescriptions Last Dose Informant Patient Reported? Taking?    Calcium 500 MG tablet   Yes No   Sig: Take 1 tablet by mouth daily   Omega-3 Fatty Acids (EQL OMEGA 3 FISH OIL) 1200 MG CAPS   Yes Yes   Sig: Take by mouth   albuterol (VENTOLIN HFA) 90 mcg/act inhaler   No Yes   Sig: Inhale 2 puffs as needed for wheezing   amLODIPine (NORVASC) 5 mg tablet   No Yes   Sig: Take 1 tablet (5 mg total) by mouth daily   aspirin 81 MG tablet   Yes Yes   Sig: Take 1 tablet by mouth daily   brimonidine tartrate 0 2 % ophthalmic solution   Yes Yes   Si drop 2 (two) times a day   fluticasone-salmeterol (ADVAIR) 250-50 mcg/dose inhaler   No Yes   Sig: Inhale 1 puff 2 (two) times a day   latanoprost (XALATAN) 0 005 % ophthalmic solution   Yes Yes   Si drop daily at bedtime   levothyroxine 50 mcg tablet   No Yes   Sig: Take 1 tablet (50 mcg total) by mouth daily   lisinopril (ZESTRIL) 20 mg tablet   No Yes   Sig: Take 1 tablet (20 mg total) by mouth daily      Facility-Administered Medications: None       Past Medical History:   Diagnosis Date    Anxiety     Asthma     Glaucoma     HLD (hyperlipidemia)     Hypertension     Thyroid disease        Past Surgical History:   Procedure Laterality Date    CATARACT EXTRACTION      COLONOSCOPY W/ POLYPECTOMY      via Colostomy; Pt does not have colostomy - no listing for a polypectomy of colon per [employer]    DILATION AND CURETTAGE OF UTERUS      NASAL POLYP EXCISION      Nasal Endoscopy Polypectomy    OTHER SURGICAL HISTORY  2007    Uterine prolapse and repair        Family History   Problem Relation Age of Onset    Other Family         Back Pain     Hypertension Family     Thyroid disease Family         Disorder     I have reviewed and agree with the history as documented  Social History   Substance Use Topics    Smoking status: Never Smoker    Smokeless tobacco: Never Used    Alcohol use No        Review of Systems   HENT: Negative for dental problem and nosebleeds  Respiratory: Negative for chest tightness and shortness of breath  Cardiovascular: Negative for chest pain  Gastrointestinal: Negative for abdominal pain, nausea and vomiting  Genitourinary: Negative for flank pain  Musculoskeletal: Positive for arthralgias and neck pain  Negative for back pain, gait problem, joint swelling and neck stiffness  Skin: Positive for color change and wound  Neurological: Negative for loss of consciousness, weakness, numbness and headaches     All other systems reviewed and are negative  Physical Exam  Physical Exam   Constitutional: She appears well-developed and well-nourished  She is cooperative  Non-toxic appearance  She does not have a sickly appearance  She does not appear ill  No distress  HENT:   Head: Normocephalic and atraumatic  Right Ear: Hearing normal  No drainage or swelling  Left Ear: Hearing normal  No drainage or swelling  Mouth/Throat: Mucous membranes are normal    Eyes: Conjunctivae and lids are normal  Right eye exhibits no discharge  Left eye exhibits no discharge  Neck: Trachea normal and normal range of motion  No JVD present  Cardiovascular: Normal rate, regular rhythm, normal heart sounds, intact distal pulses and normal pulses  Exam reveals no gallop and no friction rub  No murmur heard  Pulmonary/Chest: Effort normal and breath sounds normal  No stridor  No respiratory distress  She has no wheezes  She has no rales  She exhibits no tenderness  Abdominal: Soft  Normal appearance  She exhibits no distension, no ascites and no mass  There is no hepatosplenomegaly  There is no tenderness  There is no rebound, no guarding and no CVA tenderness  Musculoskeletal: Normal range of motion  She exhibits tenderness  She exhibits no deformity  Right elbow: She exhibits normal range of motion, no swelling, no effusion, no deformity and no laceration  Tenderness found  Olecranon process tenderness noted  Cervical back: She exhibits tenderness, bony tenderness and pain  She exhibits normal range of motion, no swelling, no edema, no deformity, no laceration and no spasm  Thoracic back: Normal         Lumbar back: Normal         Left hand: She exhibits tenderness and bony tenderness  She exhibits normal range of motion, normal capillary refill, no deformity and no laceration  Hands: There is an abrasion on the left 4th finger and also on the right elbow     Lymphadenopathy:        Right: No inguinal adenopathy present  Left: No inguinal adenopathy present  Neurological: She is alert  She has normal strength  No cranial nerve deficit or sensory deficit  She exhibits normal muscle tone  Coordination and gait normal  GCS eye subscore is 4  GCS verbal subscore is 5  GCS motor subscore is 6  Skin: Skin is warm, dry and intact  No rash noted  She is not diaphoretic  No erythema  No pallor  Psychiatric: She has a normal mood and affect  Her speech is normal  Cognition and memory are normal    Nursing note and vitals reviewed  Vital Signs  ED Triage Vitals [09/01/18 1147]   Temperature Pulse Respirations Blood Pressure SpO2   98 9 °F (37 2 °C) 82 18 136/61 96 %      Temp Source Heart Rate Source Patient Position - Orthostatic VS BP Location FiO2 (%)   Temporal Monitor Sitting Right arm --      Pain Score       --           Vitals:    09/01/18 1147 09/01/18 1337   BP: 136/61    Pulse: 82 63   Patient Position - Orthostatic VS: Sitting Lying       Visual Acuity      ED Medications  Medications - No data to display    Diagnostic Studies  Results Reviewed     None                 XR hand 3+ views LEFT   ED Interpretation by Nay Mix MD (09/01 4386)   I have personally reviewed the x-ray and my findings are: middle phalynx small avulsion fx  XR elbow 3+ views RIGHT   ED Interpretation by Nay Mix MD (09/01 1345)   I have personally reviewed the x-ray and my findings are: no fracture  CT cervical spine without contrast   Final Result by Gisell Knutson MD (09/01 1308)      No cervical spine fracture or traumatic malalignment  Workstation performed: [id number]         CT head without contrast   Final Result by Gisell Knutson MD (09/01 1303)      No acute intracranial abnormality                    Workstation performed: [id number]                    Procedures  Static Splint Application  Date/Time: 9/1/2018 1:47 PM  Performed by: Stef Barahona CESAR DYER  Authorized by: Lito Warner     Patient location:  ED  Procedure performed by emergency physician: Yes    Consent:     Consent obtained:  Verbal  Universal protocol:     Patient identity confirmed:  Verbally with patient  Indication:     Indications: fracture    Pre-procedure details:     Sensation:  Normal  Procedure details:     Laterality:  Left    Location:  Finger    Finger:  L small finger    Strapping: no      Splint type:  Finger splint, static    Supplies:  Aluminum splint  Post-procedure details:     Pain:  Unchanged    Sensation:  Normal    Neurovascular Exam: skin pink      Patient tolerance of procedure: Tolerated well, no immediate complications           Phone Contacts  ED Phone Contact    ED Course                               MDM  Number of Diagnoses or Management Options  Abrasion of right elbow:   Abrasion, finger w/o infection:   Fall:   Finger fracture, left:   Diagnosis management comments: Fall  X-ray evaluation with possible left 5th finger fracture  That was splinted by me  Wounds appear intact  CT scan of the head and neck or normal   The patient will check with the primary care doctor regarding her tetanus status as she feels she may be up-to-date  I could not find a record of it  Amount and/or Complexity of Data Reviewed  Tests in the radiology section of CPT®: ordered and reviewed  Independent visualization of images, tracings, or specimens: yes    Patient Progress  Patient progress: stable    CritCare Time    Disposition  Final diagnoses:   Fall   Finger fracture, left - Middle phalanx   Abrasion, finger w/o infection   Abrasion of right elbow     Time reflects when diagnosis was documented in both MDM as applicable and the Disposition within this note     Time User Action Codes Description Comment    9/1/2018  1:29 PM Yue Del Rosario Add [A57  XXXA] Fall     9/1/2018  1:30 PM Yue Del Rosario Add [S62 609A] Finger fracture, left 9/1/2018  1:30 PM Patricia Zahra Modify [C30 329A] Finger fracture, left Middle phalanx    9/1/2018  1:31 PM Patricia DYER Add [S60 419A] Abrasion, finger w/o infection     9/1/2018  1:31 PM Keila Hernadez Add [S50 311A] Abrasion of right elbow       ED Disposition     ED Disposition Condition Comment    Discharge  Lena Bro discharge to home/self care  Condition at discharge: Good        Follow-up Information     Follow up With Specialties Details Why Contact Info    Chadwick Guerrero DO Family Medicine Schedule an appointment as soon as possible for a visit in 1 week  [facility] [address]  [phone]            Discharge Medication List as of 9/1/2018  1:32 PM      CONTINUE these medications which have NOT CHANGED    Details   albuterol (VENTOLIN HFA) 90 mcg/act inhaler Inhale 2 puffs as needed for wheezing, Starting Fri 4/13/2018, Print      amLODIPine (NORVASC) 5 mg tablet Take 1 tablet (5 mg total) by mouth daily, Starting Tue 1/30/2018, Normal      aspirin 81 MG tablet Take 1 tablet by mouth daily, Starting Tue 5/27/2014, Historical Med      brimonidine tartrate 0 2 % ophthalmic solution 1 drop 2 (two) times a day, Historical Med      fluticasone-salmeterol (ADVAIR) 250-50 mcg/dose inhaler Inhale 1 puff 2 (two) times a day, Starting Mon 7/2/2018, Normal      latanoprost (XALATAN) 0 005 % ophthalmic solution 1 drop daily at bedtime, Historical Med      levothyroxine 50 mcg tablet Take 1 tablet (50 mcg total) by mouth daily, Starting Mon 7/2/2018, Normal      lisinopril (ZESTRIL) 20 mg tablet Take 1 tablet (20 mg total) by mouth daily, Starting Mon 7/2/2018, Normal      Omega-3 Fatty Acids (EQL OMEGA 3 FISH OIL) 1200 MG CAPS Take by mouth, Starting Wed 11/13/2013, Historical Med      Calcium 500 MG tablet Take 1 tablet by mouth daily, Starting Tue 5/3/2016, Historical Med           No discharge procedures on file      ED Provider  Electronically Signed by Severa Peyer, MD  09/01/18 2826

## 2018-09-01 NOTE — DISCHARGE INSTRUCTIONS
Abrasion   WHAT YOU NEED TO KNOW:   An abrasion is a scrape on your skin  It happens when your skin rubs against a rough surface  Some examples of an abrasion include rug burn, a skinned elbow, or road rash  Abrasions can be many shapes and sizes  The wound may hurt, bleed, bruise, or swell  DISCHARGE INSTRUCTIONS:   Return to the emergency department if:   · The bleeding does not stop after 10 minutes of firm pressure  · You cannot rinse one or more foreign objects out of your wound  · You have red streaks on your skin coming from your wound  Contact your healthcare provider if:   · You have a fever or chills  · Your abrasion is red, warm, swollen, or draining pus  · You have questions or concerns about your condition or care  Care for your abrasion:   · Wash your hands and dry them with a clean towel  · Press a clean cloth against your wound to stop any bleeding  · Rinse your wound with a lot of clean water  Do not use harsh soap, alcohol, or iodine solutions  · Use a clean, wet cloth to remove any objects, such as small pieces of rocks or dirt  · Rub antibiotic ointment on your wound  This may help prevent infection and help your wound heal     · Cover the wound with a non-stick bandage  Change the bandage daily, and if gets wet or dirty  Follow up with your healthcare provider as directed:  Write down your questions so you remember to ask them during your visits  © 2017 [employer] Information is for End User's use only and may not be sold, redistributed or otherwise used for commercial purposes  All illustrations and images included in CareNotes® are the copyrighted property of [employer] , [employer]  or Alexandre Sanchez  The above information is an  only  It is not intended as medical advice for individual conditions or treatments   Talk to your doctor, nurse or pharmacist before following any medical regimen to see if it is safe and effective for you     Finger Fracture   WHAT YOU NEED TO KNOW:   A finger fracture is a break in 1 or more of the bones in your finger  DISCHARGE INSTRUCTIONS:   Return to the emergency department if:   · Your cast or splint gets wet, damaged, or comes off  · Your splint or cast feels too tight  · You have severe pain  · Your injured finger is numb, cold, or pale  Contact your healthcare provider or hand specialist if:   · Your pain or swelling gets worse, even after treatment  · You have questions or concerns about your condition or care  Medicines:   · NSAIDs , such as ibuprofen, help decrease swelling, pain, and fever  This medicine is available with or without a doctor's order  NSAIDs can cause stomach bleeding or kidney problems in certain people  If you take blood thinner medicine, always ask your healthcare provider if NSAIDs are safe for you  Always read the medicine label and follow directions  · Acetaminophen  decreases pain and fever  It is available without a doctor's order  Ask how much to take and how often to take it  Follow directions  Acetaminophen can cause liver damage if not taken correctly  · Prescription pain medicine  may be given  Ask your healthcare provider how to take this medicine safely  Some prescription pain medicines contain acetaminophen  Do not take other medicines that contain acetaminophen without talking to your healthcare provider  Too much acetaminophen may cause liver damage  Prescription pain medicine may cause constipation  Ask your healthcare provider how to prevent or treat constipation  · Take your medicine as directed  Contact your healthcare provider if you think your medicine is not helping or if you have side effects  Tell him or her if you are allergic to any medicine  Keep a list of the medicines, vitamins, and herbs you take  Include the amounts, and when and why you take them  Bring the list or the pill bottles to follow-up visits   Carry your medicine list with you in case of an emergency  Self-care:   · Wear your splint as directed  Do not remove your splint until you follow up with your healthcare provider or hand specialist      · Apply ice  on your finger for 15 to 20 minutes every hour or as directed  Use an ice pack, or put crushed ice in a plastic bag  Cover it with a towel before you apply it to your skin  Ice helps prevent tissue damage and decreases swelling and pain  · Elevate  your finger above the level of your heart as often as you can  This will help decrease swelling and pain  Prop your hand on pillows or blankets to keep it elevated comfortably  · Go to physical therapy as directed  A physical therapist teaches you exercises to help improve movement and strength, and to decrease pain  Follow up with your healthcare provider or hand specialist within 2 days:  Write down your questions so you remember to ask them during your visits  © 2017 [employer] [employer] Information is for End User's use only and may not be sold, redistributed or otherwise used for commercial purposes  All illustrations and images included in CareNotes® are the copyrighted property of [employer]  or Alexandre Sanchez  The above information is an  only  It is not intended as medical advice for individual conditions or treatments  Talk to your doctor, nurse or pharmacist before following any medical regimen to see if it is safe and effective for you

## 2018-10-23 ENCOUNTER — OFFICE VISIT (OUTPATIENT)
Dept: FAMILY MEDICINE CLINIC | Facility: CLINIC | Age: 83
End: 2018-10-23
Payer: MEDICARE

## 2018-10-23 VITALS
SYSTOLIC BLOOD PRESSURE: 142 MMHG | DIASTOLIC BLOOD PRESSURE: 64 MMHG | BODY MASS INDEX: 26.66 KG/M2 | HEIGHT: 58 IN | TEMPERATURE: 97.7 F | WEIGHT: 127 LBS

## 2018-10-23 DIAGNOSIS — E78.2 MIXED HYPERLIPIDEMIA: Primary | ICD-10-CM

## 2018-10-23 DIAGNOSIS — J45.30 MILD PERSISTENT ASTHMA, UNSPECIFIED WHETHER COMPLICATED: ICD-10-CM

## 2018-10-23 DIAGNOSIS — I70.213 ATHEROSCLEROSIS OF NATIVE ARTERY OF BOTH LOWER EXTREMITIES WITH INTERMITTENT CLAUDICATION (HCC): ICD-10-CM

## 2018-10-23 DIAGNOSIS — E03.9 HYPOTHYROIDISM, UNSPECIFIED TYPE: ICD-10-CM

## 2018-10-23 DIAGNOSIS — G56.02 CARPAL TUNNEL SYNDROME OF LEFT WRIST: ICD-10-CM

## 2018-10-23 DIAGNOSIS — I10 HYPERTENSION, UNSPECIFIED TYPE: ICD-10-CM

## 2018-10-23 PROCEDURE — 99214 OFFICE O/P EST MOD 30 MIN: CPT | Performed by: FAMILY MEDICINE

## 2018-10-23 RX ORDER — LISINOPRIL 20 MG/1
20 TABLET ORAL DAILY
Qty: 90 TABLET | Refills: 0 | Status: SHIPPED | OUTPATIENT
Start: 2018-10-23 | End: 2019-01-02 | Stop reason: SDUPTHER

## 2018-10-23 RX ORDER — LEVOTHYROXINE SODIUM 0.05 MG/1
50 TABLET ORAL DAILY
Qty: 90 TABLET | Refills: 0 | Status: SHIPPED | OUTPATIENT
Start: 2018-10-23 | End: 2019-01-02 | Stop reason: SDUPTHER

## 2018-10-23 RX ORDER — AMLODIPINE BESYLATE 5 MG/1
5 TABLET ORAL DAILY
Qty: 90 TABLET | Refills: 0 | Status: SHIPPED | OUTPATIENT
Start: 2018-10-23 | End: 2019-01-02 | Stop reason: SDUPTHER

## 2018-10-23 NOTE — PROGRESS NOTES
Assessment/Plan:       Diagnoses and all orders for this visit:    Mixed hyperlipidemia    Hypothyroidism, unspecified type  -     lisinopril (ZESTRIL) 20 mg tablet; Take 1 tablet (20 mg total) by mouth daily  -     levothyroxine 50 mcg tablet; Take 1 tablet (50 mcg total) by mouth daily  -     amLODIPine (NORVASC) 5 mg tablet; Take 1 tablet (5 mg total) by mouth daily    Hypertension, unspecified type  -     lisinopril (ZESTRIL) 20 mg tablet; Take 1 tablet (20 mg total) by mouth daily  -     amLODIPine (NORVASC) 5 mg tablet; Take 1 tablet (5 mg total) by mouth daily    Mild persistent asthma, unspecified whether complicated  -     lisinopril (ZESTRIL) 20 mg tablet; Take 1 tablet (20 mg total) by mouth daily  -     amLODIPine (NORVASC) 5 mg tablet; Take 1 tablet (5 mg total) by mouth daily    Atherosclerosis of native artery of both lower extremities with intermittent claudication (HCC)    Carpal tunnel syndrome of left wrist  -     Orthopedics          Subjective:      Patient ID: Dima Barba is a 80 y o  female  Patient is here to follow-up on CAD hypothyroidism hypertension hyperlipidemia  Patient has numbness of the left for 2nd and 3rd fingers over the past 6 months  Patient has also status post fall and was seen in emergency room  No chest pain or shortness of breath  Normal urination or defecation  All reviews of systems negative  The following portions of the patient's history were reviewed and updated as appropriate: allergies, current medications, past family history, past medical history, past social history, past surgical history and problem list     Review of Systems   Constitutional: Negative  HENT: Negative  Eyes: Negative  Respiratory: Negative  Cardiovascular: Negative  Gastrointestinal: Negative  Endocrine: Negative  Genitourinary: Negative  Musculoskeletal: Positive for arthralgias  Skin: Negative  Allergic/Immunologic: Negative      Neurological: Positive for numbness  Hematological: Negative  Psychiatric/Behavioral: Negative  Objective:      /64 (BP Location: Right arm)   Temp 97 7 °F (36 5 °C)   Ht 4' 9 5" (1 461 m)   Wt 57 6 kg (127 lb)   BMI 27 01 kg/m²          Physical Exam   Constitutional: She is oriented to person, place, and time  She appears well-developed and well-nourished  No distress  HENT:   Head: Normocephalic  Right Ear: External ear normal    Left Ear: External ear normal    Mouth/Throat: Oropharynx is clear and moist  No oropharyngeal exudate  Eyes: Pupils are equal, round, and reactive to light  EOM are normal  Right eye exhibits no discharge  Left eye exhibits no discharge  No scleral icterus  Neck: Normal range of motion  Neck supple  No thyromegaly present  Cardiovascular: Normal rate, regular rhythm, normal heart sounds and intact distal pulses  Exam reveals no gallop and no friction rub  No murmur heard  Pulmonary/Chest: Effort normal and breath sounds normal  No respiratory distress  She has no wheezes  She has no rales  She exhibits no tenderness  Abdominal: Soft  Bowel sounds are normal  She exhibits no distension  There is no tenderness  There is no rebound and no guarding  Musculoskeletal: Normal range of motion  She exhibits tenderness  She exhibits no edema  Lymphadenopathy:     She has no cervical adenopathy  Neurological: She is oriented to person, place, and time  No cranial nerve deficit  She exhibits normal muscle tone  Coordination normal    Skin: Skin is warm and dry  No rash noted  She is not diaphoretic  No erythema  No pallor  Psychiatric: She has a normal mood and affect  Her behavior is normal  Judgment and thought content normal    Nursing note and vitals reviewed

## 2018-11-08 ENCOUNTER — OFFICE VISIT (OUTPATIENT)
Dept: OBGYN CLINIC | Facility: MEDICAL CENTER | Age: 83
End: 2018-11-08
Payer: MEDICARE

## 2018-11-08 VITALS
DIASTOLIC BLOOD PRESSURE: 73 MMHG | HEIGHT: 59 IN | HEART RATE: 73 BPM | BODY MASS INDEX: 25.6 KG/M2 | WEIGHT: 127 LBS | SYSTOLIC BLOOD PRESSURE: 177 MMHG

## 2018-11-08 DIAGNOSIS — R20.0 NUMBNESS AND TINGLING IN BOTH HANDS: Primary | ICD-10-CM

## 2018-11-08 DIAGNOSIS — R20.2 NUMBNESS AND TINGLING IN BOTH HANDS: Primary | ICD-10-CM

## 2018-11-08 PROCEDURE — 99203 OFFICE O/P NEW LOW 30 MIN: CPT | Performed by: EMERGENCY MEDICINE

## 2018-11-08 NOTE — PROGRESS NOTES
Assessment/Plan:    Diagnoses and all orders for this visit:    Numbness and tingling in both hands  -     Cock Up Wrist Splint    I would appreciate a Hand surgeon's consult for evaluation and treatment for likely b/l CTS  Patient may benefit from CT steroid injection  I have recommended wrist brace for night time, as well as NSAIDs  Return for with Hand specialist to discuss CT steroid injections  Chief Complaint:   b/l hand pain    Subjective:   Patient ID: Gopi Lee is a 80 y o  female  Patient presents with daughter for 4 months of left hand n/t of digits 1-3, as well as pain in the palm  She has trouble holding and grasping things  Symptoms worse at night  She has similar symptoms but to lesser severity of the right hand  Review of Systems   Constitutional: Negative for fever  Respiratory: Negative for shortness of breath  Cardiovascular: Negative for chest pain  Musculoskeletal: Positive for arthralgias  Neurological: Positive for numbness  The following portions of the patient's chart were reviewed and updated as appropriate: Allergy:    Allergies   Allergen Reactions    Aspirin     Cephalexin Nausea Only    Erythromycin     Phenazopyridine     Piroxicam     Sulfamethoxazole-Trimethoprim          Past Medical History:   Diagnosis Date    Anxiety     Asthma     Glaucoma     HLD (hyperlipidemia)     Hypertension     Thyroid disease        Past Surgical History:   Procedure Laterality Date    CATARACT EXTRACTION      COLONOSCOPY W/ POLYPECTOMY      via Colostomy; Pt does not have colostomy - no listing for a polypectomy of colon per [employer]    DILATION AND CURETTAGE OF UTERUS      NASAL POLYP EXCISION      Nasal Endoscopy Polypectomy    OTHER SURGICAL HISTORY  04/2007    Uterine prolapse and repair        Social History     Social History    Marital status:       Spouse name: N/A    Number of children: N/A    Years of education: N/A Occupational History    Not on file       Social History Main Topics    Smoking status: Never Smoker    Smokeless tobacco: Never Used    Alcohol use No    Drug use: No    Sexual activity: No     Other Topics Concern    Not on file     Social History Narrative    No narrative on file       Family History   Problem Relation Age of Onset    Other Family         Back Pain     Hypertension Family     Thyroid disease Family         Disorder       Medications:    Current Outpatient Prescriptions:     albuterol (VENTOLIN HFA) 90 mcg/act inhaler, Inhale 2 puffs as needed for wheezing, Disp: 1 Inhaler, Rfl: 1    amLODIPine (NORVASC) 5 mg tablet, Take 1 tablet (5 mg total) by mouth daily, Disp: 90 tablet, Rfl: 0    aspirin 81 MG tablet, Take 1 tablet by mouth daily, Disp: , Rfl:     brimonidine tartrate 0 2 % ophthalmic solution, 1 drop 2 (two) times a day, Disp: , Rfl:     Calcium 500 MG tablet, Take 1 tablet by mouth daily, Disp: , Rfl:     fluticasone-salmeterol (ADVAIR) 250-50 mcg/dose inhaler, Inhale 1 puff 2 (two) times a day, Disp: 1 Inhaler, Rfl: 0    latanoprost (XALATAN) 0 005 % ophthalmic solution, 1 drop daily at bedtime, Disp: , Rfl:     levothyroxine 50 mcg tablet, Take 1 tablet (50 mcg total) by mouth daily, Disp: 90 tablet, Rfl: 0    lisinopril (ZESTRIL) 20 mg tablet, Take 1 tablet (20 mg total) by mouth daily, Disp: 90 tablet, Rfl: 0    Omega-3 Fatty Acids (EQL OMEGA 3 FISH OIL) 1200 MG CAPS, Take by mouth, Disp: , Rfl:     Patient Active Problem List   Diagnosis    Anemia    Asthma    Atherosclerosis of native artery of both lower extremities with intermittent claudication (HCC)    Carotid atherosclerosis    Complete tear of right rotator cuff    Disc degeneration, lumbar    GERD without esophagitis    Glaucoma    Hyperlipidemia    Hypertension    Hypothyroidism    Lumbar canal stenosis    Peripheral neuropathy    Spondylosis of lumbar region without myelopathy or radiculopathy    Incomplete tear of left rotator cuff    Trigger middle finger of left hand    Slow transit constipation    Carpal tunnel syndrome of left wrist       Objective:  Right Hand Exam     Tenderness   The patient is experiencing no tenderness  Tests   Phalens Sign: negative  Tinels Sign (Medial Nerve): negative    Other   Erythema: absent  Sensation: normal    Comments:  Neg tinnels, phalens and reverse phalens b/l      Left Hand Exam     Tenderness   The patient is experiencing no tenderness  Tests   Phalens Sign: negative  Tinels Sign (Medial Nerve): negative    Other   Erythema: absent  Sensation: normal          Tests     Left Wrist/Hand   Negative Phalen's sign and Tinel's sign (medial nerve)  Right Wrist/Hand   Negative Phalen's sign and Tinel's sign (medial nerve)  Physical Exam      Neurologic Exam    Procedures    There are no pertinent studies obtained with regards to today's office visit

## 2018-11-14 ENCOUNTER — OFFICE VISIT (OUTPATIENT)
Dept: OBGYN CLINIC | Facility: CLINIC | Age: 83
End: 2018-11-14
Payer: MEDICARE

## 2018-11-14 VITALS
HEIGHT: 59 IN | WEIGHT: 127.6 LBS | HEART RATE: 75 BPM | SYSTOLIC BLOOD PRESSURE: 146 MMHG | BODY MASS INDEX: 25.72 KG/M2 | DIASTOLIC BLOOD PRESSURE: 82 MMHG

## 2018-11-14 DIAGNOSIS — G56.02 CARPAL TUNNEL SYNDROME, LEFT: Primary | ICD-10-CM

## 2018-11-14 PROCEDURE — 20526 THER INJECTION CARP TUNNEL: CPT | Performed by: ORTHOPAEDIC SURGERY

## 2018-11-14 PROCEDURE — 99213 OFFICE O/P EST LOW 20 MIN: CPT | Performed by: ORTHOPAEDIC SURGERY

## 2018-11-14 RX ORDER — LIDOCAINE HYDROCHLORIDE 10 MG/ML
1 INJECTION, SOLUTION INFILTRATION; PERINEURAL
Status: COMPLETED | OUTPATIENT
Start: 2018-11-14 | End: 2018-11-14

## 2018-11-14 RX ORDER — BETAMETHASONE SODIUM PHOSPHATE AND BETAMETHASONE ACETATE 3; 3 MG/ML; MG/ML
6 INJECTION, SUSPENSION INTRA-ARTICULAR; INTRALESIONAL; INTRAMUSCULAR; SOFT TISSUE
Status: COMPLETED | OUTPATIENT
Start: 2018-11-14 | End: 2018-11-14

## 2018-11-14 RX ADMIN — BETAMETHASONE SODIUM PHOSPHATE AND BETAMETHASONE ACETATE 6 MG: 3; 3 INJECTION, SUSPENSION INTRA-ARTICULAR; INTRALESIONAL; INTRAMUSCULAR; SOFT TISSUE at 10:41

## 2018-11-14 RX ADMIN — LIDOCAINE HYDROCHLORIDE 1 ML: 10 INJECTION, SOLUTION INFILTRATION; PERINEURAL at 10:41

## 2018-11-14 NOTE — LETTER
November 14, 2018     Jose Harrison MD  [address]    Patient: Sheila Jiang   YOB: 1930   Date of Visit: 11/14/2018       Dear Chanel Andujar:    Thank you for referring Sheila Jiang to me for a 2nd opinion Below are my notes for this consultation  If you have questions, please do not hesitate to call me  I look forward to following your patient along with you  Sincerely,        Raj Campos MD        CC: No Recipients      Chief Complaint   Patient presents with    Left Hand - Pain, Numbness    Right Hand - Pain, Numbness         Assessment:  Bilateral carpal tunnel syndrome-left greater than right    Plan:  I explained the diagnosis of carpal tunnel syndrome to both the patient and her daughter  She is quite alert and with it and I went over options of operative versus non operative treatment  We will try a one  time cortisone injection into her carpal tunnel today as the conservative treatment option and she already has a night splint which I would like her to wear also  I cautioned them that the success rate of cortisone injection for carpal tunnel  is only 29-45%, but certainly worth a try  Surgical intervention carries a much higher success rate, but also with her age there are higher risk factors even with local anesthesia  I will see her back again in a month to see if this has helped  If improved I will inject the right wrist and if not, we will consider other options  I did print out a booklet on carpal tunnel syndrome which they can read prior to her next visit    HPI:   Patient is an 61-year-old left hand dominant female who presents today for consultation for bilateral numbness and tingling of the hands x4 months  She was previously seen in this practice  On today's presentation she reports numbness and tingling of the left hand, particularly the in the thumb, index, and middle fingers  She reports that she frequently drops things    She also has similar symptoms of the right hand but they are not as significant  She reports that she tries to avoid taking medications for pain because she is on medications for so many other things  Past medical history, family history,  social history, medication history, and allergies are reviewed  Past medical history is significant for vascular claudication, GERD, hyperlipidemia, hypertension, hyperthyroidism, peripheral neuropathy, among others that may be contributory to treatment and prognosis of current issue  Please see HPI for pertinent review of systems  All other systems reviewed are negative  /82   Pulse 75   Ht 4' 11" (1 499 m)   Wt 57 9 kg (127 lb 9 6 oz)   BMI 25 77 kg/m²         Exam:   She is quite alert, oriented, in no acute distress  Patient presents with mild atrophy of the right thenar eminence, but not the left  Skin is warm and dry to touch with no signs of erythema, ecchymosis, or infection  She has mild tenderness to palpation with palpable nodule in the flexor tendon of the index and middle fingers of the left hand at the A1 pulley  She has no other tenderness on exam  There is no antecubital adenopathy noted on exam  She has positive Phalen's test on the left side  She has adductor pollicis weakness noted bilaterally          Studies Reviewed:  None      Hand/upper extremity injection  Date/Time: 11/14/2018 10:41 AM  Consent given by: patient  Site marked: site marked  Timeout: Immediately prior to procedure a time out was called to verify the correct patient, procedure, equipment, support staff and site/side marked as required   Supporting Documentation  Indications: diagnostic and pain   Procedure Details  Condition:carpal tunnel syndrome Site: L carpal tunnel   Preparation: Patient was prepped and draped in the usual sterile fashion  Needle size: 25 G  Ultrasound guidance: no  Approach: dorsal  Medications administered: 1 mL lidocaine 1 %; 6 mg betamethasone acetate-betamethasone sodium phosphate 6 (3-3) mg/mL  Patient tolerance: patient tolerated the procedure well with no immediate complications  Dressing:  Sterile dressing applied         Scribe Attestation    I,:   Lay Cervantes am acting as a scribe while in the presence of the attending physician :        I,:   Sharifa Peterson MD personally performed the services described in this documentation    as scribed in my presence :

## 2018-11-14 NOTE — LETTER
November 14, 2018     [place]enn[place] Heather, [address] [address]    Patient: Jael Mclaughlin   YOB: 1930   Date of Visit: 11/14/2018       Dear Dr Norman Srivastava:    Thank you for referring Jael Mclaughlin to me for evaluation  Below are my notes for this consultation  If you have questions, please do not hesitate to call me  I look forward to following your patient along with you  Sincerely,        Romel Krishnamurthy MD        CC: No Recipients      Chief Complaint   Patient presents with    Left Hand - Pain, Numbness    Right Hand - Pain, Numbness         Assessment:  Bilateral carpal tunnel syndrome-left greater than right    Plan:  I explained the diagnosis of carpal tunnel syndrome to both the patient and her daughter  She is quite alert and with it and I went over options of operative versus non operative treatment  We will try a one  time cortisone injection into her carpal tunnel today as the conservative treatment option and she already has a night splint which I would like her to wear also  I cautioned them that the success rate of cortisone injection for carpal tunnel  is only 76-98%, but certainly worth a try  Surgical intervention carries a much higher success rate, but also with her age there are higher risk factors even with local anesthesia  I will see her back again in a month to see if this has helped  If improved I will inject the right wrist and if not, we will consider other options  I did print out a booklet on carpal tunnel syndrome which they can read prior to her next visit    HPI:   Patient is an 22-year-old left hand dominant female who presents today for consultation for bilateral numbness and tingling of the hands x4 months  She was previously seen in this practice  On today's presentation she reports numbness and tingling of the left hand, particularly the in the thumb, index, and middle fingers  She reports that she frequently drops things    She also has similar symptoms of the right hand but they are not as significant  She reports that she tries to avoid taking medications for pain because she is on medications for so many other things  Past medical history, family history,  social history, medication history, and allergies are reviewed  Past medical history is significant for vascular claudication, GERD, hyperlipidemia, hypertension, hyperthyroidism, peripheral neuropathy, among others that may be contributory to treatment and prognosis of current issue  Please see HPI for pertinent review of systems  All other systems reviewed are negative  /82   Pulse 75   Ht 4' 11" (1 499 m)   Wt 57 9 kg (127 lb 9 6 oz)   BMI 25 77 kg/m²         Exam:   She is quite alert, oriented, in no acute distress  Patient presents with mild atrophy of the right thenar eminence, but not the left  Skin is warm and dry to touch with no signs of erythema, ecchymosis, or infection  She has mild tenderness to palpation with palpable nodule in the flexor tendon of the index and middle fingers of the left hand at the A1 pulley  She has no other tenderness on exam  There is no antecubital adenopathy noted on exam  She has positive Phalen's test on the left side  She has adductor pollicis weakness noted bilaterally          Studies Reviewed:  None      Hand/upper extremity injection  Date/Time: 11/14/2018 10:41 AM  Consent given by: patient  Site marked: site marked  Timeout: Immediately prior to procedure a time out was called to verify the correct patient, procedure, equipment, support staff and site/side marked as required   Supporting Documentation  Indications: diagnostic and pain   Procedure Details  Condition:carpal tunnel syndrome Site: L carpal tunnel   Preparation: Patient was prepped and draped in the usual sterile fashion  Needle size: 25 G  Ultrasound guidance: no  Approach: dorsal  Medications administered: 1 mL lidocaine 1 %; 6 mg betamethasone acetate-betamethasone sodium phosphate 6 (3-3) mg/mL  Patient tolerance: patient tolerated the procedure well with no immediate complications  Dressing:  Sterile dressing applied         Scribe Attestation    I,:   Alicia Rosa am acting as a scribe while in the presence of the attending physician :        I,:   Augustus Ball MD personally performed the services described in this documentation    as scribed in my presence :

## 2018-11-14 NOTE — PROGRESS NOTES
Chief Complaint   Patient presents with    Left Hand - Pain, Numbness    Right Hand - Pain, Numbness         Assessment:  Bilateral carpal tunnel syndrome-left greater than right    Plan:  I explained the diagnosis of carpal tunnel syndrome to both the patient and her daughter  She is quite alert and with it and I went over options of operative versus non operative treatment  We will try a one  time cortisone injection into her carpal tunnel today as the conservative treatment option and she already has a night splint which I would like her to wear also  I cautioned them that the success rate of cortisone injection for carpal tunnel  is only 25-75%, but certainly worth a try  Surgical intervention carries a much higher success rate, but also with her age there are higher risk factors even with local anesthesia  I will see her back again in a month to see if this has helped  If improved I will inject the right wrist and if not, we will consider other options  I did print out a booklet on carpal tunnel syndrome which they can read prior to her next visit    HPI:   Patient is an 80-year-old left hand dominant female who presents today for consultation for bilateral numbness and tingling of the hands x4 months  She was previously seen in this practice  On today's presentation she reports numbness and tingling of the left hand, particularly the in the thumb, index, and middle fingers  She reports that she frequently drops things  She also has similar symptoms of the right hand but they are not as significant  She reports that she tries to avoid taking medications for pain because she is on medications for so many other things  Past medical history, family history,  social history, medication history, and allergies are reviewed     Past medical history is significant for vascular claudication, GERD, hyperlipidemia, hypertension, hyperthyroidism, peripheral neuropathy, among others that may be contributory to treatment and prognosis of current issue  Please see HPI for pertinent review of systems  All other systems reviewed are negative  /82   Pulse 75   Ht 4' 11" (1 499 m)   Wt 57 9 kg (127 lb 9 6 oz)   BMI 25 77 kg/m²        Exam:   She is quite alert, oriented, in no acute distress  Patient presents with mild atrophy of the right thenar eminence, but not the left  Skin is warm and dry to touch with no signs of erythema, ecchymosis, or infection  She has mild tenderness to palpation with palpable nodule in the flexor tendon of the index and middle fingers of the left hand at the A1 pulley  She has no other tenderness on exam  There is no antecubital adenopathy noted on exam  She has positive Phalen's test on the left side  She has adductor pollicis weakness noted bilaterally          Studies Reviewed:  None      Hand/upper extremity injection  Date/Time: 11/14/2018 10:41 AM  Consent given by: patient  Site marked: site marked  Timeout: Immediately prior to procedure a time out was called to verify the correct patient, procedure, equipment, support staff and site/side marked as required   Supporting Documentation  Indications: diagnostic and pain   Procedure Details  Condition:carpal tunnel syndrome Site: L carpal tunnel   Preparation: Patient was prepped and draped in the usual sterile fashion  Needle size: 25 G  Ultrasound guidance: no  Approach: dorsal  Medications administered: 1 mL lidocaine 1 %; 6 mg betamethasone acetate-betamethasone sodium phosphate 6 (3-3) mg/mL  Patient tolerance: patient tolerated the procedure well with no immediate complications  Dressing:  Sterile dressing applied         Scribe Attestation    I,:   Rafael Garcia am acting as a scribe while in the presence of the attending physician :        I,:   Michele Meléndez MD personally performed the services described in this documentation    as scribed in my presence :

## 2018-11-14 NOTE — PROGRESS NOTES
Chief Complaint   Patient presents with    Left Hand - Pain, Numbness    Right Hand - Pain, Numbness           Assessment:  ***    Plan :  ***    HPI:   ***    PMHx:         Past Medical History:   Diagnosis Date    Anxiety     Asthma     Glaucoma     HLD (hyperlipidemia)     Hypertension     Thyroid disease        Past Surgical History:   Procedure Laterality Date    CATARACT EXTRACTION      COLONOSCOPY W/ POLYPECTOMY      via Colostomy; Pt does not have colostomy - no listing for a polypectomy of colon per [employer]    DILATION AND CURETTAGE OF UTERUS      NASAL POLYP EXCISION      Nasal Endoscopy Polypectomy    OTHER SURGICAL HISTORY  04/2007    Uterine prolapse and repair        Family History   Problem Relation Age of Onset    Other Family         Back Pain     Hypertension Family     Thyroid disease Family         Disorder       Social History     Social History    Marital status:      Spouse name: N/A    Number of children: N/A    Years of education: N/A     Occupational History    Not on file       Social History Main Topics    Smoking status: Never Smoker    Smokeless tobacco: Never Used    Alcohol use No    Drug use: No    Sexual activity: No     Other Topics Concern    Not on file     Social History Narrative    No narrative on file       Current Outpatient Prescriptions   Medication Sig Dispense Refill    albuterol (VENTOLIN HFA) 90 mcg/act inhaler Inhale 2 puffs as needed for wheezing 1 Inhaler 1    amLODIPine (NORVASC) 5 mg tablet Take 1 tablet (5 mg total) by mouth daily 90 tablet 0    aspirin 81 MG tablet Take 1 tablet by mouth daily      brimonidine tartrate 0 2 % ophthalmic solution 1 drop 2 (two) times a day      Calcium 500 MG tablet Take 1 tablet by mouth daily      fluticasone-salmeterol (ADVAIR) 250-50 mcg/dose inhaler Inhale 1 puff 2 (two) times a day 1 Inhaler 0    latanoprost (XALATAN) 0 005 % ophthalmic solution 1 drop daily at bedtime      levothyroxine 50 mcg tablet Take 1 tablet (50 mcg total) by mouth daily 90 tablet 0    lisinopril (ZESTRIL) 20 mg tablet Take 1 tablet (20 mg total) by mouth daily 90 tablet 0    Omega-3 Fatty Acids (EQL OMEGA 3 FISH OIL) 1200 MG CAPS Take by mouth       No current facility-administered medications for this visit  Allergies: Aspirin; Cephalexin; Erythromycin; Phenazopyridine; Piroxicam; and Sulfamethoxazole-trimethoprim    ROS:  Positive for ***  The remaining ***/12 systems on the intake sheet that I reviewed were negative  PE:  There were no vitals taken for this visit  Constitutional: The patient was  oriented to person, place, and time  Well-developed and well-nourished  In no acute distress  HEENT: Vision intact  Hearing normal  Swallowing normal   Head: Normocephalic  Cardiovascular: Intact distal pulses  Pulse regular  Pulmonary/Chest: Effort normal  No respiratory distress  Neurological: Alert and oriented to person, place, and time  Skin: Skin is warm  Psychiatric: Normal mood and affect       Ortho Exam:  ***    Studies reviewed: ***

## 2018-11-14 NOTE — PATIENT INSTRUCTIONS
Plan:  I explained the diagnosis of carpal tunnel syndrome to both the patient and her daughter  She is quite alert and with it and I went over options of operative versus non operative treatment  We will try a one  time cortisone injection into her carpal tunnel today as the conservative treatment option and she already has a night splint which I would like her to wear also  I cautioned them that the success rate of cortisone injection for carpal tunnel  is only 27-10%, but certainly worth a try  Surgical intervention carries a much higher success rate, but also with her age there are higher risk factors even with local anesthesia  I will see her back again in a month to see if this has helped  If improved I will inject the right wrist and if not, we will consider other options    I did print out a booklet on carpal tunnel syndrome which they can read prior to her next visit

## 2018-12-18 ENCOUNTER — OFFICE VISIT (OUTPATIENT)
Dept: OBGYN CLINIC | Facility: CLINIC | Age: 83
End: 2018-12-18
Payer: MEDICARE

## 2018-12-18 VITALS
BODY MASS INDEX: 25.6 KG/M2 | HEIGHT: 59 IN | HEART RATE: 80 BPM | DIASTOLIC BLOOD PRESSURE: 68 MMHG | SYSTOLIC BLOOD PRESSURE: 170 MMHG | WEIGHT: 127 LBS

## 2018-12-18 DIAGNOSIS — G56.02 CARPAL TUNNEL SYNDROME, LEFT: Primary | ICD-10-CM

## 2018-12-18 PROCEDURE — 1123F ACP DISCUSS/DSCN MKR DOCD: CPT | Performed by: PHYSICIAN ASSISTANT

## 2018-12-18 PROCEDURE — 99213 OFFICE O/P EST LOW 20 MIN: CPT | Performed by: ORTHOPAEDIC SURGERY

## 2018-12-18 NOTE — PATIENT INSTRUCTIONS
Plan:   The carpal tunnel injection did not work and she has persistent symptoms of numbness and dropping objects  Even with her age, she is quite alert and appears younger than her chronologic age  For that reason I believe she deserves carpal tunnel release under local anesthesia with sedation only  She will not go to sleep and will not need the breathing tube  I explained the operation, its risks, alternatives of no treatment and the postop course clearly and fully to the patient and her daughter    They want to proceed and I will schedule this to be done as an outpatient here at [facility] on 01/10/2019

## 2018-12-18 NOTE — LETTER
December 18, 2018     Joey Guillory, [address] [address]    Patient: Ciara Yoder   YOB: 1930   Date of Visit: 12/18/2018       Dear Dr Rafat Corey:    Thank you for referring Ciara Yoder to me for evaluation  Below are my notes for this consultation  If you have questions, please do not hesitate to call me  I look forward to following your patient along with you  Sincerely,        Greyson Márquez MD        CC: No Recipients  Greyson Márquez MD  12/18/2018  9:49 AM  Sign at close encounter  Chief Complaint   Patient presents with    Left Hand - Follow-up         Assessment:  Bilateral carpal tunnel syndrome-left greater than right    Plan:   The carpal tunnel injection did not work and she has persistent symptoms of numbness and dropping objects  Even with her age, she is quite alert and appears younger than her chronologic age  For that reason I believe she deserves carpal tunnel release under local anesthesia with sedation only  She will not go to sleep and will not need the breathing tube  I explained the operation, its risks, alternatives of no treatment and the postop course clearly and fully to the patient and her daughter  They want to proceed and I will schedule this to be done as an outpatient here at [facility] on 01/10/2019  I would like her to get preop medical evaluation and clearance by her family physician and her daughter will try to arrange that  HPI:   Patient is an 80-year-old left hand dominant female who presents today for consultation for bilateral numbness and tingling of the hands x4 months  She was previously seen in this practice  On today's presentation she reports numbness and tingling of the left hand, particularly the in the thumb, index, and middle fingers  She reports that she frequently drops things  She also has similar symptoms of the right hand but they are not as significant    She reports that she tries to avoid taking medications for pain because she is on medications for so many other things  She returns now on 12/18/2018  She did not get any significant relief from the cortisone injection that I gave her a month ago  She still dropping objects and has pain and numbness  She has similar but less severe symptoms on the right side    Past medical history, family history,  social history, medication history, and allergies are reviewed  Past medical history is significant for vascular claudication, GERD, hyperlipidemia, hypertension, hyperthyroidism, peripheral neuropathy, among others that may be contributory to treatment and prognosis of current issue  Please see HPI for pertinent review of systems  All other systems reviewed are negative  /68   Pulse 80   Ht 4' 11" (1 499 m)   Wt 57 6 kg (127 lb)   BMI 25 65 kg/m²         Exam:   She is quite alert, oriented, in no acute distress  She again showed mild atrophy of the right thenar eminence, but not the left  Skin is warm and dry to touch with no signs of erythema, ecchymosis, or infection  She has mild tenderness to palpation with palpable nodule in the flexor tendon of the index and middle fingers of the left hand at the A1 pulley  She has no other tenderness on exam  There is no antecubital adenopathy noted on exam  She has positive Phalen's test on the left side and questionable Tinel's sign on this side  She has adductor pollicis weakness noted bilaterally  Radial pulse was intact    Sensation to light touch at least was normal       Studies Reviewed:  None      Procedures    Scribe Attestation    I,:    am acting as a scribe while in the presence of the attending physician :        I,:    personally performed the services described in this documentation    as scribed in my presence :

## 2018-12-18 NOTE — PROGRESS NOTES
Chief Complaint   Patient presents with    Left Hand - Follow-up         Assessment:  Bilateral carpal tunnel syndrome-left greater than right    Plan:   The carpal tunnel injection did not work and she has persistent symptoms of numbness and dropping objects  Even with her age, she is quite alert and appears younger than her chronologic age  For that reason I believe she deserves carpal tunnel release under local anesthesia with sedation only  She will not go to sleep and will not need the breathing tube  I explained the operation, its risks, alternatives of no treatment and the postop course clearly and fully to the patient and her daughter  They want to proceed and I will schedule this to be done as an outpatient here at [facility] on 01/10/2019  I would like her to get preop medical evaluation and clearance by her family physician and her daughter will try to arrange that  HPI:   Patient is an 80-year-old left hand dominant female who presents today for consultation for bilateral numbness and tingling of the hands x4 months  She was previously seen in this practice  On today's presentation she reports numbness and tingling of the left hand, particularly the in the thumb, index, and middle fingers  She reports that she frequently drops things  She also has similar symptoms of the right hand but they are not as significant  She reports that she tries to avoid taking medications for pain because she is on medications for so many other things  She returns now on 12/18/2018  She did not get any significant relief from the cortisone injection that I gave her a month ago  She still dropping objects and has pain and numbness  She has similar but less severe symptoms on the right side    Past medical history, family history,  social history, medication history, and allergies are reviewed     Past medical history is significant for vascular claudication, GERD, hyperlipidemia, hypertension, hyperthyroidism, peripheral neuropathy, among others that may be contributory to treatment and prognosis of current issue  Please see HPI for pertinent review of systems  All other systems reviewed are negative  /68   Pulse 80   Ht 4' 11" (1 499 m)   Wt 57 6 kg (127 lb)   BMI 25 65 kg/m²        Exam:   She is quite alert, oriented, in no acute distress  She again showed mild atrophy of the right thenar eminence, but not the left  Skin is warm and dry to touch with no signs of erythema, ecchymosis, or infection  She has mild tenderness to palpation with palpable nodule in the flexor tendon of the index and middle fingers of the left hand at the A1 pulley  She has no other tenderness on exam  There is no antecubital adenopathy noted on exam  She has positive Phalen's test on the left side and questionable Tinel's sign on this side  She has adductor pollicis weakness noted bilaterally  Radial pulse was intact    Sensation to light touch at least was normal       Studies Reviewed:  None      Procedures    Scribe Attestation    I,:    am acting as a scribe while in the presence of the attending physician :        I,:    personally performed the services described in this documentation    as scribed in my presence :

## 2019-01-02 ENCOUNTER — OFFICE VISIT (OUTPATIENT)
Dept: FAMILY MEDICINE CLINIC | Facility: CLINIC | Age: 84
End: 2019-01-02
Payer: MEDICARE

## 2019-01-02 VITALS
HEIGHT: 58 IN | DIASTOLIC BLOOD PRESSURE: 68 MMHG | BODY MASS INDEX: 26.66 KG/M2 | TEMPERATURE: 97.7 F | WEIGHT: 127 LBS | SYSTOLIC BLOOD PRESSURE: 140 MMHG

## 2019-01-02 DIAGNOSIS — I10 HYPERTENSION, UNSPECIFIED TYPE: ICD-10-CM

## 2019-01-02 DIAGNOSIS — E03.9 HYPOTHYROIDISM, UNSPECIFIED TYPE: ICD-10-CM

## 2019-01-02 DIAGNOSIS — Z01.810 PREOP CARDIOVASCULAR EXAM: ICD-10-CM

## 2019-01-02 DIAGNOSIS — G56.02 CARPAL TUNNEL SYNDROME OF LEFT WRIST: ICD-10-CM

## 2019-01-02 DIAGNOSIS — E78.2 MIXED HYPERLIPIDEMIA: ICD-10-CM

## 2019-01-02 DIAGNOSIS — Z01.818 PREOPERATIVE CLEARANCE: Primary | ICD-10-CM

## 2019-01-02 DIAGNOSIS — J45.30 MILD PERSISTENT ASTHMA, UNSPECIFIED WHETHER COMPLICATED: ICD-10-CM

## 2019-01-02 PROCEDURE — 99214 OFFICE O/P EST MOD 30 MIN: CPT | Performed by: FAMILY MEDICINE

## 2019-01-02 PROCEDURE — 93000 ELECTROCARDIOGRAM COMPLETE: CPT | Performed by: FAMILY MEDICINE

## 2019-01-02 RX ORDER — AMLODIPINE BESYLATE 5 MG/1
5 TABLET ORAL DAILY
Qty: 90 TABLET | Refills: 1 | Status: SHIPPED | OUTPATIENT
Start: 2019-01-02 | End: 2019-05-22 | Stop reason: SDUPTHER

## 2019-01-02 RX ORDER — LISINOPRIL 20 MG/1
20 TABLET ORAL DAILY
Qty: 90 TABLET | Refills: 1 | Status: SHIPPED | OUTPATIENT
Start: 2019-01-02 | End: 2019-05-22 | Stop reason: SDUPTHER

## 2019-01-02 RX ORDER — LEVOTHYROXINE SODIUM 0.05 MG/1
50 TABLET ORAL DAILY
Qty: 90 TABLET | Refills: 1 | Status: SHIPPED | OUTPATIENT
Start: 2019-01-02 | End: 2019-05-22 | Stop reason: SDUPTHER

## 2019-01-02 NOTE — PROGRESS NOTES
Assessment/Plan:  EKG shows normal sinus rhythm  Patient with PACs  Refills on medications for chronic conditions listed  Patient okay to proceed with surgery if laboratory studies normal   Patient will not use lisinopril the day of surgery  Patient will stop aspirin and fish oil 1 week prior to surgery  Diagnoses and all orders for this visit:    Preoperative clearance  -     POCT ECG    Hypothyroidism, unspecified type  -     amLODIPine (NORVASC) 5 mg tablet; Take 1 tablet (5 mg total) by mouth daily  -     fluticasone-salmeterol (ADVAIR) 250-50 mcg/dose inhaler; Inhale 1 puff 2 (two) times a day  -     levothyroxine 50 mcg tablet; Take 1 tablet (50 mcg total) by mouth daily  -     lisinopril (ZESTRIL) 20 mg tablet; Take 1 tablet (20 mg total) by mouth daily    Hypertension, unspecified type  -     amLODIPine (NORVASC) 5 mg tablet; Take 1 tablet (5 mg total) by mouth daily  -     fluticasone-salmeterol (ADVAIR) 250-50 mcg/dose inhaler; Inhale 1 puff 2 (two) times a day  -     lisinopril (ZESTRIL) 20 mg tablet; Take 1 tablet (20 mg total) by mouth daily  -     POCT ECG    Mild persistent asthma, unspecified whether complicated  -     amLODIPine (NORVASC) 5 mg tablet; Take 1 tablet (5 mg total) by mouth daily  -     fluticasone-salmeterol (ADVAIR) 250-50 mcg/dose inhaler; Inhale 1 puff 2 (two) times a day  -     lisinopril (ZESTRIL) 20 mg tablet; Take 1 tablet (20 mg total) by mouth daily    Preop cardiovascular exam    Carpal tunnel syndrome of left wrist    Mixed hyperlipidemia          Subjective:      Patient ID: Arlyn Barron is a 80 y o  female  Patient is here for preop clearance requested by Dr Candelario Baez for left carpal tunnel surgery to be done on January 10th  No chest pain or shortness of breath  No palpitations or syncope  No epistaxis hematuria or hematochezia  No URI symptoms or fevers at the present time    Patient with weakness of left hand        The following portions of the patient's history were reviewed and updated as appropriate: allergies, current medications, past family history, past medical history, past social history, past surgical history and problem list     Review of Systems   HENT: Negative  Eyes: Negative  Respiratory: Negative  Cardiovascular: Negative  Gastrointestinal: Negative  Endocrine: Negative  Genitourinary: Negative  Musculoskeletal: Negative  Skin: Negative  Allergic/Immunologic: Negative  Neurological: Positive for weakness  Hematological: Negative  Psychiatric/Behavioral: Negative  Objective:      /68 (BP Location: Right arm, Patient Position: Sitting, Cuff Size: Adult)   Temp 97 7 °F (36 5 °C) (Tympanic)   Ht 4' 10" (1 473 m)   Wt 57 6 kg (127 lb)   BMI 26 54 kg/m²          Physical Exam   Constitutional: She is oriented to person, place, and time  She appears well-developed and well-nourished  No distress  HENT:   Head: Normocephalic  Right Ear: External ear normal    Left Ear: External ear normal    Mouth/Throat: Oropharynx is clear and moist  No oropharyngeal exudate  Eyes: Pupils are equal, round, and reactive to light  EOM are normal  Right eye exhibits no discharge  Left eye exhibits no discharge  No scleral icterus  Neck: Normal range of motion  Neck supple  No thyromegaly present  Cardiovascular: Normal rate, regular rhythm, normal heart sounds and intact distal pulses  Exam reveals no gallop and no friction rub  No murmur heard  Pulmonary/Chest: Effort normal and breath sounds normal  No respiratory distress  She has no wheezes  She has no rales  She exhibits no tenderness  Abdominal: Soft  Bowel sounds are normal  She exhibits no distension  There is no tenderness  There is no rebound and no guarding  Musculoskeletal: Normal range of motion  She exhibits no edema or tenderness  Lymphadenopathy:     She has no cervical adenopathy  Neurological: She is oriented to person, place, and time   No cranial nerve deficit  She exhibits normal muscle tone  Coordination normal    Skin: Skin is warm and dry  No rash noted  She is not diaphoretic  No erythema  No pallor  Psychiatric: She has a normal mood and affect  Her behavior is normal  Judgment and thought content normal    Nursing note and vitals reviewed

## 2019-01-03 ENCOUNTER — APPOINTMENT (OUTPATIENT)
Dept: PREADMISSION TESTING | Facility: HOSPITAL | Age: 84
End: 2019-01-03
Payer: MEDICARE

## 2019-01-03 DIAGNOSIS — Z01.818 PREOP TESTING: Primary | ICD-10-CM

## 2019-01-03 LAB
ANION GAP SERPL CALCULATED.3IONS-SCNC: 9 MMOL/L (ref 5–14)
BASOPHILS # BLD AUTO: 0 THOUSANDS/ΜL (ref 0–0.1)
BASOPHILS NFR BLD AUTO: 1 % (ref 0–1)
BUN SERPL-MCNC: 16 MG/DL (ref 5–25)
CALCIUM SERPL-MCNC: 9.7 MG/DL (ref 8.4–10.2)
CHLORIDE SERPL-SCNC: 103 MMOL/L (ref 97–108)
CO2 SERPL-SCNC: 27 MMOL/L (ref 22–30)
CREAT SERPL-MCNC: 0.64 MG/DL (ref 0.6–1.2)
EOSINOPHIL # BLD AUTO: 0.2 THOUSAND/ΜL (ref 0–0.4)
EOSINOPHIL NFR BLD AUTO: 4 % (ref 0–6)
ERYTHROCYTE [DISTWIDTH] IN BLOOD BY AUTOMATED COUNT: 14 %
GFR SERPL CREATININE-BSD FRML MDRD: 80 ML/MIN/1.73SQ M
GLUCOSE SERPL-MCNC: 115 MG/DL (ref 70–99)
HCT VFR BLD AUTO: 37.2 % (ref 36–46)
HGB BLD-MCNC: 12 G/DL (ref 12–16)
LYMPHOCYTES # BLD AUTO: 1 THOUSANDS/ΜL (ref 0.5–4)
LYMPHOCYTES NFR BLD AUTO: 23 % (ref 25–45)
MCH RBC QN AUTO: 29.8 PG (ref 26.8–34.3)
MCHC RBC AUTO-ENTMCNC: 32.3 G/DL (ref 31.4–37.4)
MCV RBC AUTO: 92 FL (ref 82–98)
MONOCYTES # BLD AUTO: 0.3 THOUSAND/ΜL (ref 0.2–0.9)
MONOCYTES NFR BLD AUTO: 6 % (ref 1–10)
NEUTROPHILS # BLD AUTO: 2.9 THOUSANDS/ΜL (ref 1.8–7.8)
NEUTS SEG NFR BLD AUTO: 66 % (ref 45–65)
PLATELET # BLD AUTO: 216 THOUSANDS/UL (ref 150–450)
PMV BLD AUTO: 10.7 FL (ref 8.9–12.7)
POTASSIUM SERPL-SCNC: 4.3 MMOL/L (ref 3.6–5)
RBC # BLD AUTO: 4.03 MILLION/UL (ref 4–5.2)
SODIUM SERPL-SCNC: 139 MMOL/L (ref 137–147)
WBC # BLD AUTO: 4.3 THOUSAND/UL (ref 4.31–10.16)

## 2019-01-03 PROCEDURE — 80048 BASIC METABOLIC PNL TOTAL CA: CPT

## 2019-01-03 PROCEDURE — 85025 COMPLETE CBC W/AUTO DIFF WBC: CPT

## 2019-01-03 NOTE — PRE-PROCEDURE INSTRUCTIONS
Pre-Surgery Instructions:   Medication Instructions    albuterol (VENTOLIN HFA) 90 mcg/act inhaler Instructed patient per Anesthesia Guidelines   brimonidine tartrate 0 2 % ophthalmic solution Instructed patient per Anesthesia Guidelines   fluticasone-salmeterol (ADVAIR) 250-50 mcg/dose inhaler Instructed patient per Anesthesia Guidelines   lisinopril (ZESTRIL) 20 mg tablet Instructed patient per Anesthesia Guidelines  Pre-op Showering Instructions for Surgery Patients    Before your operation, you play an important role in decreasing your risk for infection by washing with special antiseptic soap  This is an effective way to reduce bacteria on the skin which may help to prevent infections at the surgical site  Please read the following directions in advance  1  In the week before your operation, purchase a 4 ounce bottle of antiseptic soap containing chlorhexidine gluconate (CHG)  4%  Some brand names include: Aplicare®, Endure, and Hibiclens®  The cost is usually less than $5 00   For your convenience, the [address] [place] [address] carries the soap   It may also be available at your doctors office or pre-admission testing center, and at most retail pharmacies   If you are allergic or sensitive to soaps containing CHG, please let your doctor know so another antiseptic can be suggested   CHG antiseptic soap is for external use only  2   The day before your operation, follow these instructions carefully to get ready   Please clean linens (sheets) on your bed; you should sleep on clean sheets after your evening shower   Get clean towels and washcloth ready - you need enough for 2 showers   Set aside clean underwear, pajamas, and clothing to wear after the showers     Reminders:   DO NOT use any other soap or body rinse on your skin during or after the antiseptic showers   DO NOT use lotion, powder, deodorant, or perfume/aftershave of any kind on your skin after your antiseptic shower   DO NOT shave any body parts in the 24 hours/day before your operation   DO NOT get the antiseptic soap in your eyes, ears, nose, mouth, or vaginal area    3  You will need to shower the night before AND the morning of your surgery  Shower 1:   The first evening before the operation, take the first shower   First, shampoo your hair with regular shampoo and rinse it completely before you use the antiseptic soap  After washing and rinsing your hair, rinse your body   Next, use a clean washcloth to apply the antiseptic soap and wash your body from the neck down to your toes using ½ bottle of the antiseptic soap   You will use the other ½ bottle for the second shower   Clean the area where your incision will be; lather this area well for about 2 minutes   If you are having head or neck surgery, wash areas with the antiseptic soap   Rinse yourself completely with running water   Use a clean towel to dry off   Wear clean underwear and clothing/pajamas  Shower 2   The morning of your operation, take the second shower following the same steps as Shower 1 using the second ½ of the bottle of antiseptic soap   Use clean cloths and towels to wash and dry yourself   Wear clean underwear and clothing

## 2019-01-09 ENCOUNTER — ANESTHESIA EVENT (OUTPATIENT)
Dept: PERIOP | Facility: HOSPITAL | Age: 84
End: 2019-01-09
Payer: MEDICARE

## 2019-01-09 NOTE — ANESTHESIA PREPROCEDURE EVALUATION
Review of Systems/Medical History  Patient summary reviewed  Chart reviewed  No history of anesthetic complications     Cardiovascular  EKG reviewed, Exercise tolerance (METS): >4,  Hyperlipidemia, Hypertension controlled,   Comment: Echo '14 OK,  Pulmonary  Not a smoker , Asthma , well controlled/ stable , No sleep apnea ,        GI/Hepatic    GERD well controlled,  Hiatal hernia,             Endo/Other  History of thyroid disease , hypothyroidism,      GYN  , Prior pregnancy/OB history : 3 Parity: 3,     Comment: postmenopausal     Hematology  Anemia anemia of chronic disease,     Musculoskeletal  Back pain , lumbar pain and spinal stenosis,   Arthritis     Neurology  Negative neurology ROS      Psychology   Anxiety,              Physical Exam    Airway    Mallampati score: II  TM Distance: >3 FB  Neck ROM: full     Dental   upper dentures and lower dentures,     Cardiovascular  Cardiovascular exam normal    Pulmonary  Pulmonary exam normal     Other Findings        Anesthesia Plan  ASA Score- 3     Anesthesia Type- IV sedation with anesthesia with ASA Monitors  Additional Monitors:   Airway Plan:         Plan Factors-Patient not instructed to abstain from smoking on day of procedure  Patient did not smoke on day of surgery  Induction- intravenous  Postoperative Plan- Plan for postoperative opioid use  Informed Consent- Anesthetic plan and risks discussed with patient and daughter  I personally reviewed this patient with the CRNA  Discussed and agreed on the Anesthesia Plan with the CASSIDY Vitale

## 2019-01-10 ENCOUNTER — ANESTHESIA (OUTPATIENT)
Dept: PERIOP | Facility: HOSPITAL | Age: 84
End: 2019-01-10
Payer: MEDICARE

## 2019-01-10 ENCOUNTER — HOSPITAL ENCOUNTER (OUTPATIENT)
Facility: HOSPITAL | Age: 84
Setting detail: OUTPATIENT SURGERY
Discharge: HOME | End: 2019-01-10
Attending: ORTHOPAEDIC SURGERY | Admitting: ORTHOPAEDIC SURGERY
Payer: MEDICARE

## 2019-01-10 VITALS
OXYGEN SATURATION: 98 % | TEMPERATURE: 97.3 F | SYSTOLIC BLOOD PRESSURE: 141 MMHG | HEART RATE: 57 BPM | DIASTOLIC BLOOD PRESSURE: 67 MMHG | RESPIRATION RATE: 18 BRPM

## 2019-01-10 DIAGNOSIS — Z01.818 PREOP TESTING: ICD-10-CM

## 2019-01-10 DIAGNOSIS — G56.02 CARPAL TUNNEL SYNDROME, LEFT: Primary | ICD-10-CM

## 2019-01-10 PROCEDURE — 64721 CARPAL TUNNEL SURGERY: CPT | Performed by: PHYSICIAN ASSISTANT

## 2019-01-10 PROCEDURE — 64721 CARPAL TUNNEL SURGERY: CPT | Performed by: ORTHOPAEDIC SURGERY

## 2019-01-10 RX ORDER — MAGNESIUM HYDROXIDE 1200 MG/15ML
LIQUID ORAL AS NEEDED
Status: DISCONTINUED | OUTPATIENT
Start: 2019-01-10 | End: 2019-01-10 | Stop reason: HOSPADM

## 2019-01-10 RX ORDER — FENTANYL CITRATE/PF 50 MCG/ML
12.5 SYRINGE (ML) INJECTION
Status: DISCONTINUED | OUTPATIENT
Start: 2019-01-10 | End: 2019-01-10 | Stop reason: HOSPADM

## 2019-01-10 RX ORDER — DIPHENHYDRAMINE HYDROCHLORIDE 50 MG/ML
12.5 INJECTION INTRAMUSCULAR; INTRAVENOUS ONCE
Status: DISCONTINUED | OUTPATIENT
Start: 2019-01-10 | End: 2019-01-10 | Stop reason: HOSPADM

## 2019-01-10 RX ORDER — LIDOCAINE HYDROCHLORIDE 10 MG/ML
INJECTION, SOLUTION INFILTRATION; PERINEURAL AS NEEDED
Status: DISCONTINUED | OUTPATIENT
Start: 2019-01-10 | End: 2019-01-10 | Stop reason: SURG

## 2019-01-10 RX ORDER — PROPOFOL 10 MG/ML
INJECTION, EMULSION INTRAVENOUS AS NEEDED
Status: DISCONTINUED | OUTPATIENT
Start: 2019-01-10 | End: 2019-01-10 | Stop reason: SURG

## 2019-01-10 RX ORDER — TRAMADOL HYDROCHLORIDE 50 MG/1
50 TABLET ORAL EVERY 6 HOURS PRN
Qty: 12 TABLET | Refills: 0 | Status: SHIPPED | OUTPATIENT
Start: 2019-01-10 | End: 2019-04-09 | Stop reason: ALTCHOICE

## 2019-01-10 RX ORDER — SODIUM CHLORIDE, SODIUM LACTATE, POTASSIUM CHLORIDE, CALCIUM CHLORIDE 600; 310; 30; 20 MG/100ML; MG/100ML; MG/100ML; MG/100ML
75 INJECTION, SOLUTION INTRAVENOUS CONTINUOUS
Status: DISCONTINUED | OUTPATIENT
Start: 2019-01-10 | End: 2019-01-10 | Stop reason: HOSPADM

## 2019-01-10 RX ORDER — PROPOFOL 10 MG/ML
INJECTION, EMULSION INTRAVENOUS CONTINUOUS PRN
Status: DISCONTINUED | OUTPATIENT
Start: 2019-01-10 | End: 2019-01-10 | Stop reason: SURG

## 2019-01-10 RX ORDER — DEXAMETHASONE SODIUM PHOSPHATE 4 MG/ML
4 INJECTION, SOLUTION INTRA-ARTICULAR; INTRALESIONAL; INTRAMUSCULAR; INTRAVENOUS; SOFT TISSUE ONCE AS NEEDED
Status: DISCONTINUED | OUTPATIENT
Start: 2019-01-10 | End: 2019-01-10 | Stop reason: HOSPADM

## 2019-01-10 RX ORDER — ROPIVACAINE HYDROCHLORIDE 2 MG/ML
INJECTION, SOLUTION EPIDURAL; INFILTRATION; PERINEURAL AS NEEDED
Status: DISCONTINUED | OUTPATIENT
Start: 2019-01-10 | End: 2019-01-10 | Stop reason: HOSPADM

## 2019-01-10 RX ORDER — FENTANYL CITRATE/PF 50 MCG/ML
25 SYRINGE (ML) INJECTION
Status: DISCONTINUED | OUTPATIENT
Start: 2019-01-10 | End: 2019-01-10 | Stop reason: HOSPADM

## 2019-01-10 RX ORDER — FENTANYL CITRATE 50 UG/ML
INJECTION, SOLUTION INTRAMUSCULAR; INTRAVENOUS AS NEEDED
Status: DISCONTINUED | OUTPATIENT
Start: 2019-01-10 | End: 2019-01-10 | Stop reason: SURG

## 2019-01-10 RX ADMIN — FENTANYL CITRATE 25 MCG: 50 INJECTION INTRAMUSCULAR; INTRAVENOUS at 09:33

## 2019-01-10 RX ADMIN — PROPOFOL 30 MCG/KG/MIN: 10 INJECTION, EMULSION INTRAVENOUS at 09:37

## 2019-01-10 RX ADMIN — LIDOCAINE HYDROCHLORIDE 30 MG: 10 INJECTION, SOLUTION INFILTRATION; PERINEURAL at 09:33

## 2019-01-10 RX ADMIN — SODIUM CHLORIDE, POTASSIUM CHLORIDE, SODIUM LACTATE AND CALCIUM CHLORIDE 75 ML/HR: 600; 310; 30; 20 INJECTION, SOLUTION INTRAVENOUS at 09:03

## 2019-01-10 RX ADMIN — PROPOFOL 30 MG: 10 INJECTION, EMULSION INTRAVENOUS at 09:33

## 2019-01-10 RX ADMIN — SODIUM CHLORIDE, POTASSIUM CHLORIDE, SODIUM LACTATE AND CALCIUM CHLORIDE: 600; 310; 30; 20 INJECTION, SOLUTION INTRAVENOUS at 09:26

## 2019-01-10 NOTE — OP NOTE
OPERATIVE REPORT  PATIENT NAME: Rahgavendra Greenfield    :  1930  MRN: [id number]  Pt Location:  OR ROOM 12    SURGERY DATE: 1/10/2019    Surgeon(s) and Role:     * Karlene Calderon MD - Primary     * Romina Gore PA-C - Assisting    Preop Diagnosis:  Carpal tunnel syndrome, left [G56 02]    Post-Op Diagnosis Codes:     * Carpal tunnel syndrome, left [G56 02]    Procedure(s) (LRB):  RELEASE LEFT CARPAL TUNNEL (Left)    Specimen(s):  * No specimens in log *    Estimated Blood Loss:   Minimal    Drains: None       Anesthesia Type:   IV Sedation with Anesthesia    Operative Indications:  Carpal tunnel syndrome, left [G56 02]  Persistent numbness and failure to respond to conservative treatment    Operative Findings: Thickened transverse carpal ligament    Complications:   None    Procedure and Technique:  After satisfactory IV sedation was induced, the left fingers, hand, and arm, and  were scrubbed with ChloraPrep, painted with ChloraPrep, and draped in a sterile manner  The proposed incision was marked  at the intersection of Masters's cardinal line and a line drawn from the ulnar aspect of the flexed ring finger  This was measured on the skin for approximately 3 cm  The area was infiltrated with 0 2% Naropin as well as the skin on the ulnar aspect of the distal forearm  The limb was then exsanguinated and the pneumatic tourniquet was inflated to 250 mm of mercury  An incision was made over the marked area of the skin down through skin and subcutaneous tissue  Retraction with Gabriela Budd was done to retract the subcutaneous tissue out of the way and the deep palmar fascia was visualized  This was incised with a 59 [place] blade  The deepest portion of the transverse carpal ligament was incised carefully as the median nerve was directly underneath  It should be noted that the ligament was thickened and quite firm on cutting    The nerve was then visualized and using a wetted Arleene Ranks any scarring was freed from underneath the ligament distally and proximally to break up any adhesions between the nerve and the tissue  Then a carpal tunnel knife was used and directed slightly ulnarly to free the remainder of the proximal transverse carpal ligament and the deep palmar fascia of the forearm  Distally, small dissecting scissors were used to free the remainder of the ligament  The nerve was then totally freed  The tourniquet was deflated  Bleeding vessels were coagulated with bipolar electrocautery and the wound was thoroughly irrigated with saline  The skin only was closed with mattress sutures of 4- 0 Prolene  Adaptic gauze, 4 x 4s,  fluffs and a compression wrap were applied and the procedure was terminated  The patient tolerated the  procedure well and was transferred to the recovery room in satisfactory, stable condition     A physician assistant was required during the procedure for retraction tissue handling,dissection and suturing    Patient Disposition:  PACU     SIGNATURE: Dakotah Oneal MD  DATE: January 10, 2019  TIME: 10:02 AM

## 2019-01-10 NOTE — H&P (VIEW-ONLY)
Assessment/Plan:  EKG shows normal sinus rhythm  Patient with PACs  Refills on medications for chronic conditions listed  Patient okay to proceed with surgery if laboratory studies normal   Patient will not use lisinopril the day of surgery  Patient will stop aspirin and fish oil 1 week prior to surgery  Diagnoses and all orders for this visit:    Preoperative clearance  -     POCT ECG    Hypothyroidism, unspecified type  -     amLODIPine (NORVASC) 5 mg tablet; Take 1 tablet (5 mg total) by mouth daily  -     fluticasone-salmeterol (ADVAIR) 250-50 mcg/dose inhaler; Inhale 1 puff 2 (two) times a day  -     levothyroxine 50 mcg tablet; Take 1 tablet (50 mcg total) by mouth daily  -     lisinopril (ZESTRIL) 20 mg tablet; Take 1 tablet (20 mg total) by mouth daily    Hypertension, unspecified type  -     amLODIPine (NORVASC) 5 mg tablet; Take 1 tablet (5 mg total) by mouth daily  -     fluticasone-salmeterol (ADVAIR) 250-50 mcg/dose inhaler; Inhale 1 puff 2 (two) times a day  -     lisinopril (ZESTRIL) 20 mg tablet; Take 1 tablet (20 mg total) by mouth daily  -     POCT ECG    Mild persistent asthma, unspecified whether complicated  -     amLODIPine (NORVASC) 5 mg tablet; Take 1 tablet (5 mg total) by mouth daily  -     fluticasone-salmeterol (ADVAIR) 250-50 mcg/dose inhaler; Inhale 1 puff 2 (two) times a day  -     lisinopril (ZESTRIL) 20 mg tablet; Take 1 tablet (20 mg total) by mouth daily    Preop cardiovascular exam    Carpal tunnel syndrome of left wrist    Mixed hyperlipidemia          Subjective:      Patient ID: Carlos Valadez is a 80 y o  female  Patient is here for preop clearance requested by Dr Kimani Calabrese for left carpal tunnel surgery to be done on January 10th  No chest pain or shortness of breath  No palpitations or syncope  No epistaxis hematuria or hematochezia  No URI symptoms or fevers at the present time    Patient with weakness of left hand        The following portions of the patient's history were reviewed and updated as appropriate: allergies, current medications, past family history, past medical history, past social history, past surgical history and problem list     Review of Systems   HENT: Negative  Eyes: Negative  Respiratory: Negative  Cardiovascular: Negative  Gastrointestinal: Negative  Endocrine: Negative  Genitourinary: Negative  Musculoskeletal: Negative  Skin: Negative  Allergic/Immunologic: Negative  Neurological: Positive for weakness  Hematological: Negative  Psychiatric/Behavioral: Negative  Objective:      /68 (BP Location: Right arm, Patient Position: Sitting, Cuff Size: Adult)   Temp 97 7 °F (36 5 °C) (Tympanic)   Ht 4' 10" (1 473 m)   Wt 57 6 kg (127 lb)   BMI 26 54 kg/m²          Physical Exam   Constitutional: She is oriented to person, place, and time  She appears well-developed and well-nourished  No distress  HENT:   Head: Normocephalic  Right Ear: External ear normal    Left Ear: External ear normal    Mouth/Throat: Oropharynx is clear and moist  No oropharyngeal exudate  Eyes: Pupils are equal, round, and reactive to light  EOM are normal  Right eye exhibits no discharge  Left eye exhibits no discharge  No scleral icterus  Neck: Normal range of motion  Neck supple  No thyromegaly present  Cardiovascular: Normal rate, regular rhythm, normal heart sounds and intact distal pulses  Exam reveals no gallop and no friction rub  No murmur heard  Pulmonary/Chest: Effort normal and breath sounds normal  No respiratory distress  She has no wheezes  She has no rales  She exhibits no tenderness  Abdominal: Soft  Bowel sounds are normal  She exhibits no distension  There is no tenderness  There is no rebound and no guarding  Musculoskeletal: Normal range of motion  She exhibits no edema or tenderness  Lymphadenopathy:     She has no cervical adenopathy  Neurological: She is oriented to person, place, and time   No cranial nerve deficit  She exhibits normal muscle tone  Coordination normal    Skin: Skin is warm and dry  No rash noted  She is not diaphoretic  No erythema  No pallor  Psychiatric: She has a normal mood and affect  Her behavior is normal  Judgment and thought content normal    Nursing note and vitals reviewed

## 2019-01-10 NOTE — DISCHARGE INSTRUCTIONS
Carpal Tunnel Syndrome   WHAT YOU SHOULD KNOW:   Carpal tunnel syndrome (CTS) is a condition where there is increased pressure on the median nerve in the wrist  The median nerve controls muscles and feeling in the hand  Pressure may come from overuse and swelling of ligaments in the wrist    INSTRUCTIONS:   Medicines:   · NSAIDs:  These medicines decrease swelling and pain  NSAIDs are available without a doctor's order  Ask your primary healthcare provider which medicine is right for you and how much to take  Take as directed  NSAIDs can cause stomach bleeding or kidney problems if not taken correctly  · Take your medicine as directed  Call your healthcare provider if you think your medicine is not helping or if you have side effects  Tell him if you are allergic to any medicine  Keep a list of the medicines, vitamins, and herbs you take  Include the amounts, and when and why you take them  Bring the list or the pill bottles to follow-up visits  Carry your medicine list with you in case of an emergency  Follow up with your healthcare provider as directed:  Write down your questions so you remember to ask them during your visits  Manage your symptoms:   · Use ice:  Ice helps decrease swelling and pain in your wrist  Ice may also help prevent tissue damage  Use an ice pack or put crushed ice in a plastic bag  Cover the ice pack with a towel and place it on your wrist for 15 to 20 minutes every hour  · Get physical and occupational therapy:  Physical therapists will show you ways to exercise and strengthen your wrist  Occupational therapists will show you safe ways to use your wrist while you do your usual activities  · Rest your hands:  Let your hands rest for a short time between repetitive motions, such as typing  If you feel pain, stop what you are doing and gently massage your wrist and hand  · Use a wrist splint: This keeps your wrist straight or in a slightly bent position   A wrist splint decreases pressure on the median nerve by letting your wrist rest  You may need to wear the splint for up to 8 weeks  You may need to wear your wrist splint at night  · Evaluate your work habits:  Ask about ways to modify your work to help decrease your symptoms  Contact your primary healthcare provider if:   · Your symptoms are worse than before  · You have questions or concerns about your condition or care  Return to the emergency department if:   · You suddenly lose feeling and cannot move your hand  · Your hand suddenly changes color  © 2014 [employer] is for End User's use only and may not be sold, redistributed or otherwise used for commercial purposes  All illustrations and images included in CareNotes® are the copyrighted property of A [employer] M [employer]  or Alexandre Sanchez  The above information is an  only  It is not intended as medical advice for individual conditions or treatments  Talk to your doctor, nurse or pharmacist before following any medical regimen to see if it is safe and effective for you

## 2019-01-10 NOTE — ANESTHESIA POSTPROCEDURE EVALUATION
Post-Op Assessment Note      CV Status:  Stable    Mental Status:  Alert and awake    Hydration Status:  Euvolemic    PONV Controlled:  Controlled    Airway Patency:  Patent    Post Op Vitals Reviewed: Yes          Staff: CRNA           BP   146/60   Temp   97   Pulse  53   Resp   16   SpO2   99

## 2019-01-10 NOTE — PERIOPERATIVE NURSING NOTE
ivs out  Wants to go home  Dressing dry and intact  Fingers warm to touch  Denies any pain  Wants to go home  Discharged via w/c after discharge instructions given and verbalized an understanding of same

## 2019-01-10 NOTE — NURSING NOTE
Received patient from OR drowsy but easily arouseable  VSS  Denies pain   JERRY dressing dry and intact, elavated

## 2019-01-10 NOTE — DISCHARGE INSTR - AVS FIRST PAGE
Dr Buster Sahu   for Patients   following   Carpal Tunnel Release      Apply ice to the wrist using a large trash bag or large bag of frozen peas for 15 minutes, 4 times a day, for 2-3 days may help with pain or swelling  Remove the postoperative dressing on the fifth day after your surgery  Place band-aids over the incision and wrap gently with the ace bandage provided  Sponge baths only for the first seven days and shower carefully thereafter  Pat the wounds dry  DO NOT bathe, soak the wounds or swim until seen in the office  Trash bags leak- do not use them over your wounds  Elevate the arm for the next 4-5 days- rest your elbow on bed or the arm of a chair and put hand above heart; do not let your arm hang down  Wiggle, wiggle, wiggle your fingers, even if slightly painful- do not let your fingers get stiff  No lifting or driving until seen in the office  Tramadol 50 mg-1 tablet- or two Tylenol [or two Advil or one Aleve] every 4-6 hours, if needed, for pain      [facility] [numbers above] to make a post-operative appointment, unless one has been made for you already

## 2019-01-10 NOTE — ANESTHESIA POSTPROCEDURE EVALUATION
Post-Op Assessment Note      CV Status:  Stable    Mental Status:  Alert and awake    Hydration Status:  Euvolemic    PONV Controlled:  Controlled    Airway Patency:  Patent    Post Op Vitals Reviewed: Yes          Staff: AnesthesiologistCASSIDY           /60 (01/10/19 1015)    Temp (!) 97 °F (36 1 °C) (01/10/19 1015)    Pulse (!) 53 (01/10/19 1015)   Resp 16 (01/10/19 1015)    SpO2 99 % (01/10/19 1015)

## 2019-01-23 ENCOUNTER — OFFICE VISIT (OUTPATIENT)
Dept: OBGYN CLINIC | Facility: CLINIC | Age: 84
End: 2019-01-23

## 2019-01-23 VITALS
BODY MASS INDEX: 25.6 KG/M2 | WEIGHT: 127 LBS | HEART RATE: 89 BPM | HEIGHT: 59 IN | SYSTOLIC BLOOD PRESSURE: 151 MMHG | DIASTOLIC BLOOD PRESSURE: 79 MMHG

## 2019-01-23 DIAGNOSIS — G56.02 CARPAL TUNNEL SYNDROME, LEFT: Primary | ICD-10-CM

## 2019-01-23 PROCEDURE — 99024 POSTOP FOLLOW-UP VISIT: CPT | Performed by: ORTHOPAEDIC SURGERY

## 2019-01-23 NOTE — PROGRESS NOTES
Chief Complaint   Patient presents with    Left Hand - Post-op         Assessment:  Bilateral carpal tunnel syndrome-left greater than right    Plan:  I am happy with her early results from the carpal tunnel release  I now want her  to wash her hand in warm soapy water for 5 minutes 4 times a day for the next 2 weeks  She should move her  fingers in the water to encourage motion  I then want her to rinse the soap off thoroughly, dry the skin, and then rub cocoa butter or  vitamin E cream firmly into the scar for one minute 4 times a day for the next month to soften the scar  I gave her a sponge to use on dry land to try to encourage motion or she can use  putty for the same reason  She can use the hand for normal activities, but I want her  to hold off on heavy lifting or strenuous activities yet  I will see her back again in one month for routine follow-up     HPI:   Patient is an 80-year-old left hand dominant female who presents today for consultation for bilateral numbness and tingling of the hands x4 months  She was previously seen in this practice  On today's presentation she reports numbness and tingling of the left hand, particularly the in the thumb, index, and middle fingers  She reports that she frequently drops things  She also has similar symptoms of the right hand but they are not as significant  She reports that she tries to avoid taking medications for pain because she is on medications for so many other things  She returns now on 12/18/2018  She did not get any significant relief from the cortisone injection that I gave her a month ago  She is still dropping objects and has pain and numbness  She has similar but less severe symptoms on the right side  Because of persistence of symptoms she underwent uneventful left carpal tunnel release under local anesthesia with sedation at [facility] on 01/10/2019  She returns for follow-up now on 01/23/2019    Her numbness is just about totally gone except in the tip of her thumb; she only has minimal pain over the incision  Past medical history, family history,  social history, medication history, and allergies are reviewed  Past medical history is significant for vascular claudication, GERD, hyperlipidemia, hypertension, hyperthyroidism, peripheral neuropathy, among others that may be contributory to treatment and prognosis of current issue  Please see HPI for pertinent review of systems  All other systems reviewed are negative  Exam:   /79   Pulse 89   Ht 4' 11" (1 499 m)   Wt 57 6 kg (127 lb)   BMI 25 65 kg/m²   The sutures from her left wrist   The incision is clean and healed without any drainage, redness, or discharge  She had no cellulitis of palm or wrist  Radial pulse was normal   Sensation to light touch was intact in all 5 fingers  She has maintained excellent MCP joint motion can get her fingers down to the distal palmar crease   She had good elbow motion and wrist motion is close to normal       Studies Reviewed:  None      Procedures    Scribe Attestation    I,:    am acting as a scribe while in the presence of the attending physician :        I,:    personally performed the services described in this documentation    as scribed in my presence :

## 2019-01-23 NOTE — LETTER
January 23, 2019     Ulysses Mims, [address] [id number]    Patient: Bhavana Massey   YOB: 1930   Date of Visit: 1/23/2019       Dear Dr Meir Golden:    Thank you for referring Bhavana Massey to me for evaluation  Below are my notes for this consultation  If you have questions, please do not hesitate to call me  I look forward to following your patient along with you  Sincerely,        Lynn Parker MD        CC: No Recipients  Lynn Parker MD  1/23/2019 12:12 PM  Incomplete  Chief Complaint   Patient presents with    Left Hand - Post-op         Assessment:  Bilateral carpal tunnel syndrome-left greater than right    Plan:  I am happy with her early results from the carpal tunnel release  I now want her  to wash her hand in warm soapy water for 5 minutes 4 times a day for the next 2 weeks  She should move her  fingers in the water to encourage motion  I then want her to rinse the soap off thoroughly, dry the skin, and then rub cocoa butter or  vitamin E cream firmly into the scar for one minute 4 times a day for the next month to soften the scar  I gave her a sponge to use on dry land to try to encourage motion or she can use  putty for the same reason  She can use the hand for normal activities, but I want her  to hold off on heavy lifting or strenuous activities yet  I will see her back again in one month for routine follow-up     HPI:   Patient is an 59-year-old left hand dominant female who presents today for consultation for bilateral numbness and tingling of the hands x4 months  She was previously seen in this practice  On today's presentation she reports numbness and tingling of the left hand, particularly the in the thumb, index, and middle fingers  She reports that she frequently drops things  She also has similar symptoms of the right hand but they are not as significant    She reports that she tries to avoid taking medications for pain because she is on medications for so many other things  She returns now on 12/18/2018  She did not get any significant relief from the cortisone injection that I gave her a month ago  She is still dropping objects and has pain and numbness  She has similar but less severe symptoms on the right side  Because of persistence of symptoms she underwent uneventful left carpal tunnel release under local anesthesia with sedation at [facility] on 01/10/2019  She returns for follow-up now on 01/23/2019  Her numbness is just about totally gone except in the tip of her thumb; she only has minimal pain over the incision  Past medical history, family history,  social history, medication history, and allergies are reviewed  Past medical history is significant for vascular claudication, GERD, hyperlipidemia, hypertension, hyperthyroidism, peripheral neuropathy, among others that may be contributory to treatment and prognosis of current issue  Please see HPI for pertinent review of systems  All other systems reviewed are negative  Exam:   /79   Pulse 89   Ht 4' 11" (1 499 m)   Wt 57 6 kg (127 lb)   BMI 25 65 kg/m²    The sutures from her left wrist   The incision is clean and healed without any drainage, redness, or discharge  She had no cellulitis of palm or wrist  Radial pulse was normal   Sensation to light touch was intact in all 5 fingers  She has maintained excellent MCP joint motion can get her fingers down to the distal palmar crease   She had good elbow motion and wrist motion is close to normal       Studies Reviewed:  None      Procedures    Scribe Attestation    I,:    am acting as a scribe while in the presence of the attending physician :        I,:    personally performed the services described in this documentation    as scribed in my presence :

## 2019-02-08 DIAGNOSIS — I10 HYPERTENSION, UNSPECIFIED TYPE: ICD-10-CM

## 2019-02-08 DIAGNOSIS — J45.30 MILD PERSISTENT ASTHMA, UNSPECIFIED WHETHER COMPLICATED: ICD-10-CM

## 2019-02-08 DIAGNOSIS — E03.9 HYPOTHYROIDISM, UNSPECIFIED TYPE: ICD-10-CM

## 2019-02-08 RX ORDER — ALBUTEROL SULFATE 90 UG/1
2 AEROSOL, METERED RESPIRATORY (INHALATION) 3 TIMES DAILY
Qty: 1 INHALER | Refills: 1 | Status: SHIPPED | OUTPATIENT
Start: 2019-02-08 | End: 2019-08-01 | Stop reason: SDUPTHER

## 2019-02-27 ENCOUNTER — OFFICE VISIT (OUTPATIENT)
Dept: OBGYN CLINIC | Facility: CLINIC | Age: 84
End: 2019-02-27
Payer: MEDICARE

## 2019-02-27 VITALS
HEIGHT: 59 IN | HEART RATE: 86 BPM | BODY MASS INDEX: 25.6 KG/M2 | DIASTOLIC BLOOD PRESSURE: 76 MMHG | SYSTOLIC BLOOD PRESSURE: 153 MMHG | WEIGHT: 127 LBS

## 2019-02-27 DIAGNOSIS — G56.02 CARPAL TUNNEL SYNDROME, LEFT: Primary | ICD-10-CM

## 2019-02-27 DIAGNOSIS — M65.352 TRIGGER LITTLE FINGER OF LEFT HAND: ICD-10-CM

## 2019-02-27 PROCEDURE — 20550 NJX 1 TENDON SHEATH/LIGAMENT: CPT | Performed by: ORTHOPAEDIC SURGERY

## 2019-02-27 PROCEDURE — 99024 POSTOP FOLLOW-UP VISIT: CPT | Performed by: ORTHOPAEDIC SURGERY

## 2019-02-27 RX ORDER — LIDOCAINE HYDROCHLORIDE 10 MG/ML
0.5 INJECTION, SOLUTION INFILTRATION; PERINEURAL
Status: COMPLETED | OUTPATIENT
Start: 2019-02-27 | End: 2019-02-27

## 2019-02-27 RX ORDER — BETAMETHASONE SODIUM PHOSPHATE AND BETAMETHASONE ACETATE 3; 3 MG/ML; MG/ML
6 INJECTION, SUSPENSION INTRA-ARTICULAR; INTRALESIONAL; INTRAMUSCULAR; SOFT TISSUE
Status: COMPLETED | OUTPATIENT
Start: 2019-02-27 | End: 2019-02-27

## 2019-02-27 RX ADMIN — BETAMETHASONE SODIUM PHOSPHATE AND BETAMETHASONE ACETATE 6 MG: 3; 3 INJECTION, SUSPENSION INTRA-ARTICULAR; INTRALESIONAL; INTRAMUSCULAR; SOFT TISSUE at 11:19

## 2019-02-27 RX ADMIN — LIDOCAINE HYDROCHLORIDE 0.5 ML: 10 INJECTION, SOLUTION INFILTRATION; PERINEURAL at 11:19

## 2019-02-27 NOTE — PATIENT INSTRUCTIONS
Plan:  I am happy with her continued improvement  I do think this will continue to get better with time  She should continue with the scar desensitization with cocoa butter 2, 3, or 4 times a day for the next month and then she can stop  I did inject her left little trigger finger today with lidocaine and Celestone to hopefully decrease the triggering that she has  These injections are successful in about 60-70% of patients but we will see what happens over time  Surgical release carries a much higher success rate, however, I would like to hold off on this if at all possible  She can use her hand for all of her normal activities of daily living letting pain be the guide to her activity level  I sent her back to her family physician for routine care and will see her back again if needed

## 2019-02-27 NOTE — PROGRESS NOTES
Chief Complaint   Patient presents with    Right Wrist - Post-op        Assessment:  Bilateral carpal tunnel syndrome-- status post left carpal tunnel release-01/10/2019                           Left little trigger finger    Plan:  I am happy with her continued improvement  I do think this will continue to get better with time  She should continue with the scar desensitization with cocoa butter 2, 3, or 4 times a day for the next month and then she can stop  I did inject her left little trigger finger today with lidocaine and Celestone to hopefully decrease the triggering that she has  These injections are successful in about 60-70% of patients but we will see what happens over time  Surgical release carries a much higher success rate, however, I would like to hold off on this if at all possible  She can use her hand for all of her normal activities of daily living letting pain be the guide to her activity level  I sent her back to her family physician for routine care and will see her back again if needed  HPI:   Patient is an 49-year-old left hand dominant female who presents today for consultation for bilateral numbness and tingling of the hands x4 months  She was previously seen in this practice  On today's presentation she reports numbness and tingling of the left hand, particularly the in the thumb, index, and middle fingers  She reports that she frequently drops things  She also has similar symptoms of the right hand but they are not as significant  She reports that she tries to avoid taking medications for pain because she is on medications for so many other things  She returns now on 12/18/2018  She did not get any significant relief from the cortisone injection that I gave her a month ago  She is still dropping objects and has pain and numbness  She has similar but less severe symptoms on the right side   Because of persistence of symptoms she underwent uneventful left carpal tunnel release under local anesthesia with sedation at [facility] on 01/10/2019  She returns for follow-up now on 01/23/2019  Her numbness is just about totally gone except in the tip of her thumb; she only has minimal pain over the incision  Patient returns today on 2/27/2019, she is now 7 weeks postop left carpal tunnel release  On today's presentation she reports that her pain is resolved  She continues to have weakness with lifting heavy things such as pots and pans  She also reports mild numbness in the distal phalanx of the left thumb  She denies any subsequent bruising, swelling, or mechanical symptoms of the thumb or wrist   She does report recent painful triggering of the left small finger  She admits that the triggering was present before, however it was not causing pain at that time  Past medical history, family history,  social history, medication history, and allergies are reviewed  Past medical history is significant for vascular claudication, GERD, hyperlipidemia, hypertension, hyperthyroidism, peripheral neuropathy, among others that may be contributory to treatment and prognosis of current issue  Please see HPI for pertinent review of systems  All other systems reviewed are negative  Exam:     /76   Pulse 86   Ht 4' 11" (1 499 m)   Wt 57 6 kg (127 lb)   BMI 25 65 kg/m²     Left wrist-surgical incision is well healed, with no signs of redness, erythema, ecchymosis, or infection  She is nontender to palpation over surgical site, thenar eminence, or 1st adonis  Patient is able perform full composite fist   She exhibits mild weakness with left thumb extension and thumb abduction  5/5 MMT thumb flexion  Elbow and wrist range of motion are within normal limits as compared to contralateral extremity  2+ distal radial pulse with brisk capillary refill of all fingers  Sensation to light touch and pinprick intact      Left hand - hand exhibits no signs of obvious anatomical deformity  Patient has pain with palpation with palpable nodule proximal to the A1 pulley of the left small finger  Reproducible triggering on exam   Good capillary refill, sensation, and motion of the tip of the finger        Studies Reviewed:  None      Hand/upper extremity injection: L small A1  Date/Time: 2/27/2019 11:19 AM  Consent given by: patient  Site marked: site marked  Timeout: Immediately prior to procedure a time out was called to verify the correct patient, procedure, equipment, support staff and site/side marked as required   Supporting Documentation  Indications: tendon swelling   Procedure Details  Condition:trigger finger Location: small finger - L small A1   Preparation: Patient was prepped and draped in the usual sterile fashion  Needle size: 25 G  Ultrasound guidance: no  Approach: volar  Medications administered: 6 mg betamethasone acetate-betamethasone sodium phosphate 6 (3-3) mg/mL; 0 5 mL lidocaine 1 %    Patient tolerance: patient tolerated the procedure well with no immediate complications  Dressing:  Sterile dressing applied           Scribe Attestation    I,:   Arabella Urbina am acting as a scribe while in the presence of the attending physician :        I,:   Lynn Parker MD personally performed the services described in this documentation    as scribed in my presence :

## 2019-04-02 ENCOUNTER — OFFICE VISIT (OUTPATIENT)
Dept: FAMILY MEDICINE CLINIC | Facility: CLINIC | Age: 84
End: 2019-04-02
Payer: MEDICARE

## 2019-04-02 VITALS
HEIGHT: 59 IN | DIASTOLIC BLOOD PRESSURE: 64 MMHG | SYSTOLIC BLOOD PRESSURE: 144 MMHG | BODY MASS INDEX: 25.6 KG/M2 | TEMPERATURE: 98.1 F | WEIGHT: 127 LBS

## 2019-04-02 DIAGNOSIS — L03.031 CELLULITIS OF TOE OF RIGHT FOOT: Primary | ICD-10-CM

## 2019-04-02 DIAGNOSIS — M48.062 SPINAL STENOSIS OF LUMBAR REGION WITH NEUROGENIC CLAUDICATION: ICD-10-CM

## 2019-04-02 PROCEDURE — 99213 OFFICE O/P EST LOW 20 MIN: CPT | Performed by: FAMILY MEDICINE

## 2019-04-02 RX ORDER — CLINDAMYCIN HYDROCHLORIDE 150 MG/1
150 CAPSULE ORAL EVERY 8 HOURS SCHEDULED
Qty: 21 CAPSULE | Refills: 0 | Status: SHIPPED | OUTPATIENT
Start: 2019-04-02 | End: 2019-04-09 | Stop reason: SDUPTHER

## 2019-04-09 ENCOUNTER — OFFICE VISIT (OUTPATIENT)
Dept: FAMILY MEDICINE CLINIC | Facility: CLINIC | Age: 84
End: 2019-04-09
Payer: MEDICARE

## 2019-04-09 VITALS
SYSTOLIC BLOOD PRESSURE: 160 MMHG | DIASTOLIC BLOOD PRESSURE: 72 MMHG | OXYGEN SATURATION: 98 % | WEIGHT: 127 LBS | TEMPERATURE: 97.5 F | BODY MASS INDEX: 25.6 KG/M2 | HEIGHT: 59 IN | HEART RATE: 61 BPM

## 2019-04-09 DIAGNOSIS — L03.031 CELLULITIS OF TOE OF RIGHT FOOT: Primary | ICD-10-CM

## 2019-04-09 PROCEDURE — 99213 OFFICE O/P EST LOW 20 MIN: CPT | Performed by: FAMILY MEDICINE

## 2019-04-09 RX ORDER — CLINDAMYCIN HYDROCHLORIDE 150 MG/1
150 CAPSULE ORAL EVERY 8 HOURS SCHEDULED
Qty: 21 CAPSULE | Refills: 0 | Status: SHIPPED | OUTPATIENT
Start: 2019-04-09 | End: 2019-04-16

## 2019-04-15 ENCOUNTER — HOSPITAL ENCOUNTER (OUTPATIENT)
Dept: RADIOLOGY | Facility: HOSPITAL | Age: 84
Discharge: HOME | End: 2019-04-15
Payer: MEDICARE

## 2019-04-15 DIAGNOSIS — L03.031 CELLULITIS OF TOE OF RIGHT FOOT: ICD-10-CM

## 2019-04-15 PROCEDURE — 73630 X-RAY EXAM OF FOOT: CPT

## 2019-04-16 DIAGNOSIS — L03.031 CELLULITIS OF TOE OF RIGHT FOOT: Primary | ICD-10-CM

## 2019-04-16 RX ORDER — LEVOFLOXACIN 250 MG/1
250 TABLET ORAL DAILY
Qty: 10 TABLET | Refills: 0 | Status: SHIPPED | OUTPATIENT
Start: 2019-04-16 | End: 2019-04-26

## 2019-05-22 ENCOUNTER — OFFICE VISIT (OUTPATIENT)
Dept: FAMILY MEDICINE CLINIC | Facility: CLINIC | Age: 84
End: 2019-05-22
Payer: MEDICARE

## 2019-05-22 VITALS
DIASTOLIC BLOOD PRESSURE: 62 MMHG | BODY MASS INDEX: 25 KG/M2 | HEIGHT: 59 IN | WEIGHT: 124 LBS | HEART RATE: 64 BPM | SYSTOLIC BLOOD PRESSURE: 128 MMHG

## 2019-05-22 DIAGNOSIS — Z23 ENCOUNTER FOR IMMUNIZATION: ICD-10-CM

## 2019-05-22 DIAGNOSIS — Z00.00 MEDICARE ANNUAL WELLNESS VISIT, SUBSEQUENT: ICD-10-CM

## 2019-05-22 DIAGNOSIS — J45.30 MILD PERSISTENT ASTHMA, UNSPECIFIED WHETHER COMPLICATED: ICD-10-CM

## 2019-05-22 DIAGNOSIS — I10 HYPERTENSION, UNSPECIFIED TYPE: ICD-10-CM

## 2019-05-22 DIAGNOSIS — E03.9 HYPOTHYROIDISM, UNSPECIFIED TYPE: ICD-10-CM

## 2019-05-22 PROCEDURE — 99214 OFFICE O/P EST MOD 30 MIN: CPT | Performed by: FAMILY MEDICINE

## 2019-05-22 PROCEDURE — G0009 ADMIN PNEUMOCOCCAL VACCINE: HCPCS | Performed by: FAMILY MEDICINE

## 2019-05-22 PROCEDURE — G0439 PPPS, SUBSEQ VISIT: HCPCS | Performed by: FAMILY MEDICINE

## 2019-05-22 PROCEDURE — 90732 PPSV23 VACC 2 YRS+ SUBQ/IM: CPT | Performed by: FAMILY MEDICINE

## 2019-05-22 RX ORDER — LISINOPRIL 20 MG/1
20 TABLET ORAL DAILY
Qty: 90 TABLET | Refills: 1 | Status: SHIPPED | OUTPATIENT
Start: 2019-05-22 | End: 2019-08-27 | Stop reason: SDUPTHER

## 2019-05-22 RX ORDER — AMLODIPINE BESYLATE 5 MG/1
5 TABLET ORAL DAILY
Qty: 90 TABLET | Refills: 1 | Status: SHIPPED | OUTPATIENT
Start: 2019-05-22 | End: 2019-08-27 | Stop reason: SDUPTHER

## 2019-05-22 RX ORDER — LEVOTHYROXINE SODIUM 0.05 MG/1
50 TABLET ORAL DAILY
Qty: 90 TABLET | Refills: 1 | Status: SHIPPED | OUTPATIENT
Start: 2019-05-22 | End: 2019-08-27 | Stop reason: SDUPTHER

## 2019-05-24 ENCOUNTER — APPOINTMENT (OUTPATIENT)
Dept: LAB | Facility: HOSPITAL | Age: 84
End: 2019-05-24
Payer: MEDICARE

## 2019-05-24 DIAGNOSIS — E03.9 HYPOTHYROIDISM, UNSPECIFIED TYPE: ICD-10-CM

## 2019-05-24 DIAGNOSIS — Z00.00 MEDICARE ANNUAL WELLNESS VISIT, SUBSEQUENT: ICD-10-CM

## 2019-05-24 DIAGNOSIS — I10 HYPERTENSION, UNSPECIFIED TYPE: ICD-10-CM

## 2019-05-24 LAB
ALBUMIN SERPL BCP-MCNC: 3.8 G/DL (ref 3.5–5)
ALP SERPL-CCNC: 81 U/L (ref 46–116)
ALT SERPL W P-5'-P-CCNC: 32 U/L (ref 12–78)
ANION GAP SERPL CALCULATED.3IONS-SCNC: 9 MMOL/L (ref 4–13)
AST SERPL W P-5'-P-CCNC: 29 U/L (ref 5–45)
BASOPHILS # BLD AUTO: 0.03 THOUSANDS/ΜL (ref 0–0.1)
BASOPHILS NFR BLD AUTO: 0 % (ref 0–1)
BILIRUB SERPL-MCNC: 0.55 MG/DL (ref 0.2–1)
BUN SERPL-MCNC: 24 MG/DL (ref 5–25)
CALCIUM SERPL-MCNC: 9.8 MG/DL (ref 8.3–10.1)
CHLORIDE SERPL-SCNC: 100 MMOL/L (ref 100–108)
CHOLEST SERPL-MCNC: 310 MG/DL (ref 50–200)
CO2 SERPL-SCNC: 28 MMOL/L (ref 21–32)
CREAT SERPL-MCNC: 0.83 MG/DL (ref 0.6–1.3)
EOSINOPHIL # BLD AUTO: 0.18 THOUSAND/ΜL (ref 0–0.61)
EOSINOPHIL NFR BLD AUTO: 3 % (ref 0–6)
ERYTHROCYTE [DISTWIDTH] IN BLOOD BY AUTOMATED COUNT: 12.9 % (ref 11.6–15.1)
GFR SERPL CREATININE-BSD FRML MDRD: 63 ML/MIN/1.73SQ M
GLUCOSE P FAST SERPL-MCNC: 101 MG/DL (ref 65–99)
HCT VFR BLD AUTO: 38.9 % (ref 34.8–46.1)
HDLC SERPL-MCNC: 104 MG/DL (ref 40–60)
HGB BLD-MCNC: 12.4 G/DL (ref 11.5–15.4)
IMM GRANULOCYTES # BLD AUTO: 0.02 THOUSAND/UL (ref 0–0.2)
IMM GRANULOCYTES NFR BLD AUTO: 0 % (ref 0–2)
LDLC SERPL CALC-MCNC: 196 MG/DL (ref 0–100)
LYMPHOCYTES # BLD AUTO: 1.83 THOUSANDS/ΜL (ref 0.6–4.47)
LYMPHOCYTES NFR BLD AUTO: 26 % (ref 14–44)
MCH RBC QN AUTO: 29.7 PG (ref 26.8–34.3)
MCHC RBC AUTO-ENTMCNC: 31.9 G/DL (ref 31.4–37.4)
MCV RBC AUTO: 93 FL (ref 82–98)
MONOCYTES # BLD AUTO: 0.43 THOUSAND/ΜL (ref 0.17–1.22)
MONOCYTES NFR BLD AUTO: 6 % (ref 4–12)
NEUTROPHILS # BLD AUTO: 4.55 THOUSANDS/ΜL (ref 1.85–7.62)
NEUTS SEG NFR BLD AUTO: 65 % (ref 43–75)
NONHDLC SERPL-MCNC: 206 MG/DL
NRBC BLD AUTO-RTO: 0 /100 WBCS
PLATELET # BLD AUTO: 226 THOUSANDS/UL (ref 149–390)
PMV BLD AUTO: 12 FL (ref 8.9–12.7)
POTASSIUM SERPL-SCNC: 4.3 MMOL/L (ref 3.5–5.3)
PROT SERPL-MCNC: 8 G/DL (ref 6.4–8.2)
RBC # BLD AUTO: 4.18 MILLION/UL (ref 3.81–5.12)
SODIUM SERPL-SCNC: 137 MMOL/L (ref 136–145)
TRIGL SERPL-MCNC: 50 MG/DL
TSH SERPL DL<=0.05 MIU/L-ACNC: 1.36 UIU/ML (ref 0.36–3.74)
WBC # BLD AUTO: 7.04 THOUSAND/UL (ref 4.31–10.16)

## 2019-05-24 PROCEDURE — 80061 LIPID PANEL: CPT

## 2019-05-24 PROCEDURE — 84443 ASSAY THYROID STIM HORMONE: CPT

## 2019-05-24 PROCEDURE — 85025 COMPLETE CBC W/AUTO DIFF WBC: CPT

## 2019-05-24 PROCEDURE — 80053 COMPREHEN METABOLIC PANEL: CPT

## 2019-05-24 PROCEDURE — 36415 COLL VENOUS BLD VENIPUNCTURE: CPT

## 2019-06-04 ENCOUNTER — HOSPITAL ENCOUNTER (OUTPATIENT)
Dept: BONE DENSITY | Facility: MEDICAL CENTER | Age: 84
Discharge: HOME | End: 2019-06-04
Payer: MEDICARE

## 2019-06-04 DIAGNOSIS — Z13.820 SCREENING FOR OSTEOPOROSIS: ICD-10-CM

## 2019-06-04 PROCEDURE — 77080 DXA BONE DENSITY AXIAL: CPT

## 2019-08-01 DIAGNOSIS — I10 HYPERTENSION, UNSPECIFIED TYPE: ICD-10-CM

## 2019-08-01 DIAGNOSIS — J45.30 MILD PERSISTENT ASTHMA, UNSPECIFIED WHETHER COMPLICATED: ICD-10-CM

## 2019-08-01 DIAGNOSIS — E03.9 HYPOTHYROIDISM, UNSPECIFIED TYPE: ICD-10-CM

## 2019-08-01 RX ORDER — ALBUTEROL SULFATE 90 UG/1
2 AEROSOL, METERED RESPIRATORY (INHALATION) 3 TIMES DAILY
Qty: 1 INHALER | Refills: 2 | Status: SHIPPED | OUTPATIENT
Start: 2019-08-01 | End: 2020-06-04 | Stop reason: SDUPTHER

## 2019-08-27 ENCOUNTER — OFFICE VISIT (OUTPATIENT)
Dept: FAMILY MEDICINE CLINIC | Facility: CLINIC | Age: 84
End: 2019-08-27
Payer: MEDICARE

## 2019-08-27 VITALS
HEIGHT: 58 IN | SYSTOLIC BLOOD PRESSURE: 134 MMHG | DIASTOLIC BLOOD PRESSURE: 76 MMHG | WEIGHT: 123 LBS | BODY MASS INDEX: 25.82 KG/M2 | TEMPERATURE: 97.4 F

## 2019-08-27 DIAGNOSIS — J45.30 MILD PERSISTENT ASTHMA, UNSPECIFIED WHETHER COMPLICATED: ICD-10-CM

## 2019-08-27 DIAGNOSIS — E03.9 HYPOTHYROIDISM, UNSPECIFIED TYPE: ICD-10-CM

## 2019-08-27 DIAGNOSIS — M48.061 SPINAL STENOSIS OF LUMBAR REGION WITHOUT NEUROGENIC CLAUDICATION: ICD-10-CM

## 2019-08-27 DIAGNOSIS — M81.0 AGE-RELATED OSTEOPOROSIS WITHOUT CURRENT PATHOLOGICAL FRACTURE: ICD-10-CM

## 2019-08-27 DIAGNOSIS — I10 HYPERTENSION, UNSPECIFIED TYPE: ICD-10-CM

## 2019-08-27 DIAGNOSIS — E78.2 MIXED HYPERLIPIDEMIA: Primary | ICD-10-CM

## 2019-08-27 DIAGNOSIS — I70.213 ATHEROSCLEROSIS OF NATIVE ARTERY OF BOTH LOWER EXTREMITIES WITH INTERMITTENT CLAUDICATION (HCC): ICD-10-CM

## 2019-08-27 PROCEDURE — 99214 OFFICE O/P EST MOD 30 MIN: CPT | Performed by: FAMILY MEDICINE

## 2019-08-27 PROCEDURE — 1123F ACP DISCUSS/DSCN MKR DOCD: CPT | Performed by: FAMILY MEDICINE

## 2019-08-27 RX ORDER — LEVOTHYROXINE SODIUM 0.05 MG/1
50 TABLET ORAL DAILY
Qty: 90 TABLET | Refills: 1 | Status: SHIPPED | OUTPATIENT
Start: 2019-08-27 | End: 2020-02-27 | Stop reason: SDUPTHER

## 2019-08-27 RX ORDER — AMLODIPINE BESYLATE 5 MG/1
5 TABLET ORAL DAILY
Qty: 90 TABLET | Refills: 1 | Status: SHIPPED | OUTPATIENT
Start: 2019-08-27 | End: 2020-02-27 | Stop reason: SDUPTHER

## 2019-08-27 RX ORDER — LISINOPRIL 20 MG/1
20 TABLET ORAL DAILY
Qty: 90 TABLET | Refills: 1 | Status: SHIPPED | OUTPATIENT
Start: 2019-08-27 | End: 2020-02-27 | Stop reason: SDUPTHER

## 2019-08-27 NOTE — PROGRESS NOTES
Assessment/Plan:  The patient will continue modify diet regarding hyperlipidemia  The patient will be going to [facility] for balance exercise issues  Hypothyroidism hypertension stable at this time  Continue with current list of medications  Atherosclerosis stable continue with aspirin  Guidance given regarding bruising on hands  Asthma stable overall  Continue with current regimen  Refills given  Patient will go to balance class/physical therapy to improve this  The patient will use cane  Patient will continue with calcium and vitamin-D supplementation for osteoporosis patient follow-up in 3 months       Diagnoses and all orders for this visit:    Mixed hyperlipidemia    Hypothyroidism, unspecified type  -     amLODIPine (NORVASC) 5 mg tablet; Take 1 tablet (5 mg total) by mouth daily  -     levothyroxine 50 mcg tablet; Take 1 tablet (50 mcg total) by mouth daily  -     lisinopril (ZESTRIL) 20 mg tablet; Take 1 tablet (20 mg total) by mouth daily    Hypertension, unspecified type  -     amLODIPine (NORVASC) 5 mg tablet; Take 1 tablet (5 mg total) by mouth daily  -     lisinopril (ZESTRIL) 20 mg tablet; Take 1 tablet (20 mg total) by mouth daily    Mild persistent asthma, unspecified whether complicated  -     amLODIPine (NORVASC) 5 mg tablet; Take 1 tablet (5 mg total) by mouth daily  -     lisinopril (ZESTRIL) 20 mg tablet; Take 1 tablet (20 mg total) by mouth daily  -     fluticasone-salmeterol (ADVAIR DISKUS, WIXELA INHUB) 250-50 mcg/dose inhaler; Inhale 1 puff 2 (two) times a day Rinse mouth after use  Atherosclerosis of native artery of both lower extremities with intermittent claudication ([facility] [address] )    Spinal stenosis of lumbar region without neurogenic claudication    Age-related osteoporosis without current pathological fracture    Other orders  -     CALCIUM PO; Take by mouth  -     Discontinue: fluticasone-salmeterol (ADVAIR DISKUS, WIXELA INHUB) 250-50 mcg/dose inhaler;  Inhale 1 puff 2 (two) times a day Rinse mouth after use  Subjective:        Patient ID: Gabriel Neri is a 80 y o  female  Patient here to follow-up on hypertension hypothyroidism asthma  Patient is breathing well  The no chest pain  No change in urination or defecation  The patient has some balance issue without falls  Patient with lumbar stenosis  Patient with some bruising on arms and hands  All other review of systems negative        The following portions of the patient's history were reviewed and updated as appropriate: allergies, current medications, past family history, past medical history, past social history, past surgical history and problem list       Review of Systems   Constitutional: Negative  HENT: Negative  Eyes: Negative  Respiratory: Negative  Cardiovascular: Negative  Gastrointestinal: Negative  Endocrine: Negative  Genitourinary: Negative  Musculoskeletal: Positive for arthralgias and back pain  Muscle cramps   Skin: Positive for color change  Allergic/Immunologic: Negative  Neurological: Negative  The balance issues/ataxia   Hematological: Negative  Psychiatric/Behavioral: Negative  Objective:      BMI Counseling: Body mass index is 26 16 kg/m²  Discussed the patient's BMI with her  The BMI is above average  BMI counseling and education was provided to the patient  Nutrition recommendations include reducing portion sizes  Depression Screening Follow-up Plan: Patient's depression screening was positive with a PHQ-2 score of   Their PHQ-9 score was   Patient assessed for underlying major depression  They have no active suicidal ideations  Brief counseling provided and recommend additional follow-up/re-evaluation next office visit        /76 (BP Location: Right arm, Patient Position: Sitting, Cuff Size: Adult)   Temp (!) 97 4 °F (36 3 °C) (Tympanic)   Ht 4' 9 5" (1 461 m)   Wt 55 8 kg (123 lb)   BMI 26 16 kg/m²          Physical Exam   Constitutional: She is oriented to person, place, and time  She appears well-developed and well-nourished  No distress  HENT:   Head: Normocephalic  Right Ear: External ear normal    Left Ear: External ear normal    Mouth/Throat: Oropharynx is clear and moist  No oropharyngeal exudate  Eyes: Pupils are equal, round, and reactive to light  EOM are normal  Right eye exhibits no discharge  Left eye exhibits no discharge  No scleral icterus  Neck: Normal range of motion  Neck supple  No thyromegaly present  Cardiovascular: Normal rate, regular rhythm, normal heart sounds and intact distal pulses  Exam reveals no gallop and no friction rub  No murmur heard  Pulmonary/Chest: Effort normal and breath sounds normal  No respiratory distress  She has no wheezes  She has no rales  She exhibits no tenderness  Abdominal: Soft  Bowel sounds are normal  She exhibits no distension  There is no tenderness  There is no rebound and no guarding  Musculoskeletal: Normal range of motion  She exhibits no edema or tenderness  Lymphadenopathy:     She has no cervical adenopathy  Neurological: She is oriented to person, place, and time  No cranial nerve deficit  She exhibits normal muscle tone  Coordination normal    Skin: Skin is warm and dry  No rash noted  She is not diaphoretic  No erythema  No pallor  Psychiatric: She has a normal mood and affect  Her behavior is normal  Judgment and thought content normal    Nursing note and vitals reviewed

## 2019-09-18 ENCOUNTER — APPOINTMENT (EMERGENCY)
Dept: RADIOLOGY | Facility: HOSPITAL | Age: 84
End: 2019-09-18
Payer: MEDICARE

## 2019-09-18 ENCOUNTER — HOSPITAL ENCOUNTER (EMERGENCY)
Facility: HOSPITAL | Age: 84
Discharge: HOME | End: 2019-09-18
Attending: EMERGENCY MEDICINE | Admitting: EMERGENCY MEDICINE
Payer: MEDICARE

## 2019-09-18 ENCOUNTER — APPOINTMENT (EMERGENCY)
Dept: CT IMAGING | Facility: HOSPITAL | Age: 84
End: 2019-09-18
Payer: MEDICARE

## 2019-09-18 VITALS
SYSTOLIC BLOOD PRESSURE: 148 MMHG | TEMPERATURE: 97.6 F | OXYGEN SATURATION: 98 % | HEART RATE: 77 BPM | WEIGHT: 119.05 LBS | RESPIRATION RATE: 17 BRPM | DIASTOLIC BLOOD PRESSURE: 82 MMHG | BODY MASS INDEX: 25.32 KG/M2

## 2019-09-18 DIAGNOSIS — S80.01XA CONTUSION OF RIGHT KNEE: ICD-10-CM

## 2019-09-18 DIAGNOSIS — S09.90XA CLOSED HEAD INJURY: ICD-10-CM

## 2019-09-18 DIAGNOSIS — S00.83XA: Primary | ICD-10-CM

## 2019-09-18 PROCEDURE — 70486 CT MAXILLOFACIAL W/O DYE: CPT

## 2019-09-18 PROCEDURE — 73564 X-RAY EXAM KNEE 4 OR MORE: CPT

## 2019-09-18 PROCEDURE — 99284 EMERGENCY DEPT VISIT MOD MDM: CPT

## 2019-09-18 PROCEDURE — 99284 EMERGENCY DEPT VISIT MOD MDM: CPT | Performed by: EMERGENCY MEDICINE

## 2019-09-18 PROCEDURE — 90471 IMMUNIZATION ADMIN: CPT

## 2019-09-18 PROCEDURE — 72125 CT NECK SPINE W/O DYE: CPT

## 2019-09-18 PROCEDURE — 90715 TDAP VACCINE 7 YRS/> IM: CPT | Performed by: EMERGENCY MEDICINE

## 2019-09-18 PROCEDURE — 70450 CT HEAD/BRAIN W/O DYE: CPT

## 2019-09-18 RX ADMIN — TETANUS TOXOID, REDUCED DIPHTHERIA TOXOID AND ACELLULAR PERTUSSIS VACCINE, ADSORBED 0.5 ML: 5; 2.5; 8; 8; 2.5 SUSPENSION INTRAMUSCULAR at 12:09

## 2019-09-18 NOTE — ED PROVIDER NOTES
History  Chief Complaint   Patient presents with   [employer] Fall     pt reports mechanical fall yesterday, pt reports tripping in a hole in her yard falling forward hitting her face  pt presents with bruising around left eye  pt denies LOC or blood thinner use  66-year-old female presenting for evaluation after fall  Patient states that yesterday she was gardening when she tripped in a hole in her yard and fell forward striking her face on the grass  Complains of pain to the bridge of her nose, around her left eye and to her right knee  She was able to get up on her own and has been able to ambulate with her cane since that time  Complains of pain mainly to her nose and is concerned that she broke it  She does report that she had a bloody nose yesterday  Denies any headache, visual changes, numbness, weakness, CP, SOB, abdominal pain, back pain  No sx prior to the fall to suggest medical cause  Patient lives alone at home, drove herself to the ED today  Ambulates with a cane at baseline  Tetanus is not up-to-date  A/P:  66-year-old female s/p mechanical fall-  CTH to rule out intracranial bleed, CT face to assess for fracture, CT C-spine to rule out fracture, update tetanus, x-ray of right knee to assess for osseous abnormality, if unremarkable will discharge home          Prior to Admission Medications   Prescriptions Last Dose Informant Patient Reported? Taking?    CALCIUM PO  Self Yes No   Sig: Take by mouth   Omega-3 Fatty Acids (EQL OMEGA 3 FISH OIL) 1200 MG CAPS   Yes No   Sig: Take by mouth   albuterol (VENTOLIN HFA) 90 mcg/act inhaler   No No   Sig: Inhale 2 puffs 3 (three) times a day   amLODIPine (NORVASC) 5 mg tablet   No No   Sig: Take 1 tablet (5 mg total) by mouth daily   aspirin 81 MG tablet   Yes No   Sig: Take 81 mg by mouth daily     brimonidine tartrate 0 2 % ophthalmic solution   Yes No   Sig: Administer 1 drop to both eyes 2 (two) times a day     fluticasone-salmeterol (ADVAIR DISKUS, WIXELA INHUB) 250-50 mcg/dose inhaler   No No   Sig: Inhale 1 puff 2 (two) times a day Rinse mouth after use  latanoprost (XALATAN) 0 005 % ophthalmic solution   Yes No   Sig: Administer 1 drop to both eyes daily at bedtime     levothyroxine 50 mcg tablet   No No   Sig: Take 1 tablet (50 mcg total) by mouth daily   lisinopril (ZESTRIL) 20 mg tablet   No No   Sig: Take 1 tablet (20 mg total) by mouth daily      Facility-Administered Medications: None       Past Medical History:   Diagnosis Date    Anxiety     Arthritis     OA    Asthma     good control    Chronic pain disorder     spinal stenosis    GERD (gastroesophageal reflux disease)     off and on    Glaucoma     Hiatal hernia     HLD (hyperlipidemia)     Hx of fall 2015    Hypertension     PVD (peripheral vascular disease) ([facility] [address] [facility]     Thyroid disease     hypo       Past Surgical History:   Procedure Laterality Date    ANGIOPLASTY      jacquelyn lower legs    BLADDER SUSPENSION      with mesh    CATARACT EXTRACTION Bilateral     COLONOSCOPY      COLONOSCOPY W/ POLYPECTOMY      via Colostomy; Pt does not have colostomy - no listing for a polypectomy of colon per [employer]    DILATION AND CURETTAGE OF UTERUS      NASAL POLYP EXCISION      Nasal Endoscopy Polypectomy    OTHER SURGICAL HISTORY  04/2007    Uterine prolapse and repair     NM REVISE MEDIAN N/CARPAL TUNNEL SURG Left 1/10/2019    Procedure: RELEASE LEFT CARPAL TUNNEL;  Surgeon: Ernesto Villalobos MD;  Location: [facility] MAIN OR;  Service: Orthopedics       Family History   Problem Relation Age of Onset    Other Family         Back Pain     Hypertension Family     Thyroid disease Family         Disorder     I have reviewed and agree with the history as documented  Social History     Tobacco Use    Smoking status: Never Smoker    Smokeless tobacco: Never Used   Substance Use Topics    Alcohol use:  Yes     Alcohol/week: 1 0 standard drinks     Types: 1 Glasses of wine per week     Comment: rare    Drug use: No        Review of Systems   Constitutional: Negative for chills, fever and unexpected weight change  HENT: Negative for ear pain, rhinorrhea and sore throat  Eyes: Negative for pain and visual disturbance  Respiratory: Negative for cough and shortness of breath  Cardiovascular: Negative for chest pain and leg swelling  Gastrointestinal: Negative for abdominal pain, constipation, diarrhea, nausea and vomiting  Endocrine: Negative for polydipsia, polyphagia and polyuria  Genitourinary: Negative for dysuria, frequency, hematuria and urgency  Musculoskeletal: Negative for back pain, myalgias and neck pain  Skin: Negative for color change and rash  Allergic/Immunologic: Negative for environmental allergies and immunocompromised state  Neurological: Negative for dizziness, weakness, light-headedness, numbness and headaches  Hematological: Negative for adenopathy  Does not bruise/bleed easily  Psychiatric/Behavioral: Negative for agitation and confusion  All other systems reviewed and are negative  Physical Exam  Physical Exam   Constitutional: She is oriented to person, place, and time  She appears well-developed and well-nourished  HENT:   Nose: Nose normal    Mouth/Throat: Oropharynx is clear and moist    No scalp ttp  Ecchymosis to bridge of nose, under L eye and to forehead above L eye, mild ttp over these areas  No signficant swelling  No crepitus or deformity  No septal hematoma  No intra-oral trauma noted   Eyes: Pupils are equal, round, and reactive to light  Conjunctivae and EOM are normal    No pain with extraocular movements   Neck: Normal range of motion  Neck supple  No midline C/T/L spine ttp   Cardiovascular: Normal rate, regular rhythm, normal heart sounds and intact distal pulses  Pulmonary/Chest: Effort normal and breath sounds normal  No stridor  No respiratory distress  She has no wheezes  She has no rales  She exhibits no tenderness  Abdominal: Soft  She exhibits no distension  There is no tenderness  There is no rebound and no guarding  Musculoskeletal: Normal range of motion  She exhibits no edema or deformity  R knee with overlying abrasion, mild swelling and ecchymosis to inferior/lateral aspect of knee, full ROM, strength normal, distally NV intact, stable knee exam without any laxity   Neurological: She is alert and oriented to person, place, and time  She exhibits normal muscle tone  Coordination normal    CN 2- 12 intact, strength equal throughout, sensation grossly intact, normal coordination   Skin: Skin is warm and dry  No rash noted  Psychiatric: She has a normal mood and affect  Judgment and thought content normal    Nursing note and vitals reviewed  Vital Signs  ED Triage Vitals   Temperature Pulse Respirations Blood Pressure SpO2   09/18/19 1043 09/18/19 1042 09/18/19 1042 09/18/19 1043 09/18/19 1042   97 6 °F (36 4 °C) 73 18 (!) 191/84 97 %      Temp Source Heart Rate Source Patient Position - Orthostatic VS BP Location FiO2 (%)   09/18/19 1043 09/18/19 1042 09/18/19 1042 09/18/19 1042 --   Temporal Monitor Sitting Left arm       Pain Score       --                  Vitals:    09/18/19 1042 09/18/19 1043 09/18/19 1213   BP:  (!) 191/84 148/82   Pulse: 73  77   Patient Position - Orthostatic VS: Sitting  Sitting         Visual Acuity  Visual Acuity      Most Recent Value   L Pupil Size (mm)  1   R Pupil Size (mm)  1          ED Medications  Medications   tetanus-diphtheria-acellular pertussis (BOOSTRIX) IM injection 0 5 mL (0 5 mL Intramuscular Given 9/18/19 1209)       Diagnostic Studies  Results Reviewed     None                 CT head without contrast   ED Interpretation by Cherry Lloyd DO (09/18 1310)   IMPRESSION:       No acute intracranial abnormality  Stable compared to prior  Final Result by Yumiko Pham MD (09/18 1303)      No acute intracranial abnormality  Stable compared to prior  Workstation performed: [id number]         CT spine cervical without contrast   ED Interpretation by Efrain Malloy DO (09/18 1310)   IMPRESSION:       No cervical spine fracture or traumatic malalignment        Multilevel degenerative disc disease        Biapical pleural-parenchymal scarring appearing stable  Final Result by Kaelyn Chavez MD (09/18 1307)      No cervical spine fracture or traumatic malalignment  Multilevel degenerative disc disease  Biapical pleural-parenchymal scarring appearing stable  Workstation performed: [id number]         CT facial bones without contrast   ED Interpretation by Efrain Malloy DO (09/18 1314)   IMPRESSION:       Normal noncontrast CT of the facial bones  Stable exam       Final Result by Kaelyn Chavez MD (09/18 1311)      Normal noncontrast CT of the facial bones  Stable exam                Workstation performed: [id number]         XR knee 4+ views Right injury   ED Interpretation by Efrain Malloy DO (09/18 1310)   Interpreted by myself: no acute osseous abn      Final Result by Nahomi Caraballo MD (09/18 1358)      No acute osseous abnormality  Small effusion is seen  Should symptoms persist MRI may be useful              Workstation performed: [id number]                    Procedures  Procedures       ED Course                               MDM  Number of Diagnoses or Management Options  Closed head injury:   Contusion of right knee:   Traumatic ecchymosis of face:   Diagnosis management comments: 79 yo F s/p mechanical fall- no injuries found on imaging, discussed symptomatic treatment, PCP f/u, return precautions       Amount and/or Complexity of Data Reviewed  Tests in the radiology section of CPT®: ordered and reviewed        Disposition  Final diagnoses:   Traumatic ecchymosis of face   Closed head injury   Contusion of right knee     Time reflects when diagnosis was documented in both MDM as applicable and the Disposition within this note     Time User Action Codes Description Comment    9/18/2019  1:10 PM Adam Pair A Add [S00 83XA] Traumatic ecchymosis of face     9/18/2019  1:10 PM Adam Pair A Add [S09 90XA] Closed head injury     9/18/2019  1:10 PM Adam Pair A Add [S80 01XA] Contusion of right knee       ED Disposition     ED Disposition Condition Date/Time Comment    Discharge Stable Wed Sep 18, 2019  1:10 PM Lena Bro discharge to home/self care  Follow-up Information     Follow up With Specialties Details Why [facility], DO Family Medicine   [address] [place] [address]  [phone]            Discharge Medication List as of 9/18/2019  1:11 PM      CONTINUE these medications which have NOT CHANGED    Details   albuterol (VENTOLIN HFA) 90 mcg/act inhaler Inhale 2 puffs 3 (three) times a day, Starting Thu 8/1/2019, Normal      amLODIPine (NORVASC) 5 mg tablet Take 1 tablet (5 mg total) by mouth daily, Starting Tue 8/27/2019, Normal      aspirin 81 MG tablet Take 81 mg by mouth daily  , Starting Tue 5/27/2014, Historical Med      brimonidine tartrate 0 2 % ophthalmic solution Administer 1 drop to both eyes 2 (two) times a day  , Historical Med      CALCIUM PO Take by mouth, Historical Med      fluticasone-salmeterol (ADVAIR DISKUS, WIXELA INHUB) 250-50 mcg/dose inhaler Inhale 1 puff 2 (two) times a day Rinse mouth after use , Starting Tue 8/27/2019, Normal      latanoprost (XALATAN) 0 005 % ophthalmic solution Administer 1 drop to both eyes daily at bedtime  , Historical Med      levothyroxine 50 mcg tablet Take 1 tablet (50 mcg total) by mouth daily, Starting Tue 8/27/2019, Normal      lisinopril (ZESTRIL) 20 mg tablet Take 1 tablet (20 mg total) by mouth daily, Starting Tue 8/27/2019, Normal      Omega-3 Fatty Acids (EQL OMEGA 3 FISH OIL) 1200 MG CAPS Take by mouth, Starting Wed 11/13/2013, Historical Med           No discharge procedures on file      ED Provider  Electronically Signed by           Fred Joshua, DO  09/18/19 1445

## 2019-09-18 NOTE — DISCHARGE INSTRUCTIONS
Take Tylenol as needed for pain  Follow up with family doctor    Return to ED if any new/worsening symptoms

## 2019-11-27 ENCOUNTER — OFFICE VISIT (OUTPATIENT)
Dept: FAMILY MEDICINE CLINIC | Facility: CLINIC | Age: 84
End: 2019-11-27
Payer: MEDICARE

## 2019-11-27 VITALS
HEIGHT: 58 IN | WEIGHT: 123 LBS | BODY MASS INDEX: 25.82 KG/M2 | SYSTOLIC BLOOD PRESSURE: 132 MMHG | DIASTOLIC BLOOD PRESSURE: 82 MMHG | TEMPERATURE: 97.6 F

## 2019-11-27 DIAGNOSIS — E06.3 HYPOTHYROIDISM DUE TO HASHIMOTO'S THYROIDITIS: Primary | ICD-10-CM

## 2019-11-27 DIAGNOSIS — E03.8 HYPOTHYROIDISM DUE TO HASHIMOTO'S THYROIDITIS: Primary | ICD-10-CM

## 2019-11-27 DIAGNOSIS — M54.50 LUMBAR BACK PAIN: ICD-10-CM

## 2019-11-27 DIAGNOSIS — J45.30 MILD PERSISTENT ASTHMA WITHOUT COMPLICATION: ICD-10-CM

## 2019-11-27 DIAGNOSIS — E78.2 MIXED HYPERLIPIDEMIA: ICD-10-CM

## 2019-11-27 DIAGNOSIS — I10 ESSENTIAL HYPERTENSION: ICD-10-CM

## 2019-11-27 PROCEDURE — 99214 OFFICE O/P EST MOD 30 MIN: CPT | Performed by: FAMILY MEDICINE

## 2019-11-27 NOTE — PROGRESS NOTES
Assessment/Plan:  Hypothyroidism hypertension asthma are stable overall  Patient with elevated cholesterol but will not introduce statin given patient's age  Patient will go to pain management for lumbar back pain for possible injection  To consider physical therapy also  Patient up-to-date with flu shot  Follow up 3 months the   Meds have been refilled when needed  Patient will follow up with Orthopedics regarding carpal tunnel syndrome and hand discomfort and weakness  Diagnoses and all orders for this visit:    Hypothyroidism due to Hashimoto's thyroiditis    Essential hypertension    Mixed hyperlipidemia    Mild persistent asthma without complication    Lumbar back pain  -     Ambulatory referral to Pain Management; Future            Subjective:        Patient ID: Jens Logan is a 80 y o  female  Patient follow-up on hypertension hyperlipidemia hypothyroidism and asthma  Patient with decreased strength in bilateral hands  Patient did have carpal tunnel surgery on the left and now has it in the right  Patient will be seeing Orthopedics in December  The no chest pain or shortness of breath  Patient with ongoing back pain  No problems with urination or defecation  Patient with 1 fall since last being seen as she was trying to do some weeding  No other complaints at the present time  The following portions of the patient's history were reviewed and updated as appropriate: allergies, current medications, past family history, past medical history, past social history, past surgical history and problem list       Review of Systems   Constitutional: Negative  HENT: Negative  Eyes: Negative  Respiratory: Negative  Cardiovascular: Negative  Gastrointestinal: Negative  Endocrine: Negative  Genitourinary: Negative  Musculoskeletal: Positive for arthralgias and back pain  Skin: Negative  Allergic/Immunologic: Negative      Neurological: Positive for weakness and numbness  Hematological: Negative  Psychiatric/Behavioral: Negative  Objective:               /82 (BP Location: Left arm, Patient Position: Sitting, Cuff Size: Adult)   Temp 97 6 °F (36 4 °C) (Tympanic)   Ht 4' 9 5" (1 461 m)   Wt 55 8 kg (123 lb)   BMI 26 16 kg/m²          Physical Exam   Constitutional: She appears well-developed and well-nourished  No distress  HENT:   Head: Normocephalic  Right Ear: External ear normal    Left Ear: External ear normal    Mouth/Throat: Oropharynx is clear and moist  No oropharyngeal exudate  Eyes: Pupils are equal, round, and reactive to light  EOM are normal  Right eye exhibits no discharge  Left eye exhibits no discharge  No scleral icterus  Neck: Normal range of motion  Neck supple  No thyromegaly present  Cardiovascular: Normal rate, regular rhythm, normal heart sounds and intact distal pulses  Exam reveals no gallop and no friction rub  No murmur heard  Pulmonary/Chest: Effort normal and breath sounds normal  No respiratory distress  She has no wheezes  She has no rales  She exhibits no tenderness  Abdominal: Soft  Bowel sounds are normal  She exhibits no distension  There is no tenderness  There is no rebound and no guarding  Musculoskeletal: She exhibits tenderness  She exhibits no edema or deformity  Lumbar pain bilaterally   Lymphadenopathy:     She has no cervical adenopathy  Neurological: She is alert  No cranial nerve deficit  She exhibits normal muscle tone  Coordination normal    Skin: Skin is warm and dry  No rash noted  She is not diaphoretic  No erythema  No pallor  Psychiatric: She has a normal mood and affect  Her behavior is normal  Judgment and thought content normal    Nursing note and vitals reviewed

## 2019-12-17 ENCOUNTER — OFFICE VISIT (OUTPATIENT)
Dept: OBGYN CLINIC | Facility: CLINIC | Age: 84
End: 2019-12-17
Payer: MEDICARE

## 2019-12-17 VITALS — BODY MASS INDEX: 25.82 KG/M2 | WEIGHT: 123 LBS | HEIGHT: 58 IN

## 2019-12-17 DIAGNOSIS — M65.341 TRIGGER FINGER, RIGHT RING FINGER: ICD-10-CM

## 2019-12-17 DIAGNOSIS — M65.342 TRIGGER RING FINGER OF LEFT HAND: Primary | ICD-10-CM

## 2019-12-17 DIAGNOSIS — I10 ESSENTIAL HYPERTENSION: ICD-10-CM

## 2019-12-17 DIAGNOSIS — M65.352 TRIGGER FINGER, LEFT LITTLE FINGER: ICD-10-CM

## 2019-12-17 PROCEDURE — 99213 OFFICE O/P EST LOW 20 MIN: CPT | Performed by: ORTHOPAEDIC SURGERY

## 2019-12-17 NOTE — PATIENT INSTRUCTIONS
Plan:  These 3 trigger fingers are bothering her  quite a bit with locking and interference with her normal activities of daily living  She is left-hand dominant and the left handed ones are bother her even more  For that reason I believe that she deserves trigger finger release of the left ring and little fingers under local anesthesia with mild sedation only and then I will inject the right ring trigger finger also  This right side may also come to surgery but I cannot totally disable both hands by doing bilateral surgeries initially     I explained the operation, its risks, its alternatives, its postop course clearly and fully to the patient -she has a increased risk of medical complications because of her age alone as well as her other medical comorbidities  I will get appropriate preop medical clearance from her family physician and blood work prior to this surgery    I will schedule this to be done on 01/02/2020 as an outpatient at [facility]

## 2019-12-22 ENCOUNTER — HOSPITAL ENCOUNTER (EMERGENCY)
Facility: HOSPITAL | Age: 84
Discharge: HOME | End: 2019-12-22
Attending: EMERGENCY MEDICINE
Payer: MEDICARE

## 2019-12-22 VITALS
WEIGHT: 121.03 LBS | DIASTOLIC BLOOD PRESSURE: 73 MMHG | OXYGEN SATURATION: 98 % | SYSTOLIC BLOOD PRESSURE: 167 MMHG | HEART RATE: 72 BPM | RESPIRATION RATE: 18 BRPM | TEMPERATURE: 98.2 F | BODY MASS INDEX: 25.74 KG/M2

## 2019-12-22 DIAGNOSIS — T26.40XA PARTIAL THICKNESS BURN OF FACE AND EYE, INITIAL ENCOUNTER: ICD-10-CM

## 2019-12-22 DIAGNOSIS — T20.20XA PARTIAL THICKNESS BURN OF FACE AND EYE, INITIAL ENCOUNTER: ICD-10-CM

## 2019-12-22 DIAGNOSIS — T31.0 BURN (ANY DEGREE) INVOLVING LESS THAN 10% OF BODY SURFACE: Primary | ICD-10-CM

## 2019-12-22 PROCEDURE — 99283 EMERGENCY DEPT VISIT LOW MDM: CPT

## 2019-12-22 PROCEDURE — 99284 EMERGENCY DEPT VISIT MOD MDM: CPT | Performed by: PHYSICIAN ASSISTANT

## 2019-12-22 RX ORDER — ERYTHROMYCIN 5 MG/G
0.5 OINTMENT OPHTHALMIC ONCE
Status: COMPLETED | OUTPATIENT
Start: 2019-12-22 | End: 2019-12-22

## 2019-12-22 RX ORDER — BACITRACIN 500 [USP'U]/G
OINTMENT OPHTHALMIC 3 TIMES DAILY
Qty: 3.5 G | Refills: 0 | Status: SHIPPED | OUTPATIENT
Start: 2019-12-22 | End: 2019-12-29

## 2019-12-22 RX ORDER — BACITRACIN 500 [USP'U]/G
OINTMENT OPHTHALMIC 3 TIMES DAILY
Status: DISCONTINUED | OUTPATIENT
Start: 2019-12-22 | End: 2019-12-22

## 2019-12-22 RX ORDER — GINSENG 100 MG
1 CAPSULE ORAL ONCE
Status: COMPLETED | OUTPATIENT
Start: 2019-12-22 | End: 2019-12-22

## 2019-12-22 RX ORDER — ACETAMINOPHEN 325 MG/1
650 TABLET ORAL ONCE
Status: COMPLETED | OUTPATIENT
Start: 2019-12-22 | End: 2019-12-22

## 2019-12-22 RX ADMIN — BACITRACIN ZINC 1 LARGE APPLICATION: 500 OINTMENT TOPICAL at 11:47

## 2019-12-22 RX ADMIN — ACETAMINOPHEN 650 MG: 325 TABLET ORAL at 11:35

## 2019-12-22 RX ADMIN — FLUORESCEIN SODIUM 1 STRIP: 1 STRIP OPHTHALMIC at 11:47

## 2019-12-22 RX ADMIN — ERYTHROMYCIN 0.5 INCH: 5 OINTMENT OPHTHALMIC at 11:47

## 2019-12-22 NOTE — DISCHARGE INSTRUCTIONS
Wash balbuena gently with warm soap and water  Apply bacitracin three times a day until seen by burn team   May do warm compress for swelling
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
## 2020-01-15 ENCOUNTER — TELEPHONE (OUTPATIENT)
Dept: FAMILY MEDICINE CLINIC | Facility: CLINIC | Age: 85
End: 2020-01-15
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
## 2020-01-29 ENCOUNTER — TELEPHONE (OUTPATIENT)
Dept: OBGYN CLINIC | Facility: MEDICAL CENTER | Age: 85
End: 2020-01-29
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
## 2020-02-27 ENCOUNTER — OFFICE VISIT (OUTPATIENT)
Dept: FAMILY MEDICINE CLINIC | Facility: CLINIC | Age: 85
End: 2020-02-27
Payer: MEDICARE

## 2020-02-27 VITALS
WEIGHT: 124 LBS | HEIGHT: 58 IN | SYSTOLIC BLOOD PRESSURE: 142 MMHG | BODY MASS INDEX: 26.03 KG/M2 | TEMPERATURE: 97.9 F | DIASTOLIC BLOOD PRESSURE: 70 MMHG

## 2020-02-27 DIAGNOSIS — J45.30 MILD PERSISTENT ASTHMA, UNSPECIFIED WHETHER COMPLICATED: ICD-10-CM

## 2020-02-27 DIAGNOSIS — I70.213 ATHEROSCLEROSIS OF NATIVE ARTERY OF BOTH LOWER EXTREMITIES WITH INTERMITTENT CLAUDICATION (HCC): Primary | ICD-10-CM

## 2020-02-27 DIAGNOSIS — E03.9 HYPOTHYROIDISM, UNSPECIFIED TYPE: ICD-10-CM

## 2020-02-27 DIAGNOSIS — M17.11 PRIMARY OSTEOARTHRITIS OF RIGHT KNEE: ICD-10-CM

## 2020-02-27 DIAGNOSIS — R06.02 SHORTNESS OF BREATH: ICD-10-CM

## 2020-02-27 DIAGNOSIS — I10 HYPERTENSION, UNSPECIFIED TYPE: ICD-10-CM

## 2020-02-27 DIAGNOSIS — N39.46 MIXED STRESS AND URGE URINARY INCONTINENCE: ICD-10-CM

## 2020-02-27 PROCEDURE — 1160F RVW MEDS BY RX/DR IN RCRD: CPT | Performed by: FAMILY MEDICINE

## 2020-02-27 PROCEDURE — 4040F PNEUMOC VAC/ADMIN/RCVD: CPT | Performed by: FAMILY MEDICINE

## 2020-02-27 PROCEDURE — 99214 OFFICE O/P EST MOD 30 MIN: CPT | Performed by: FAMILY MEDICINE

## 2020-02-27 PROCEDURE — 3008F BODY MASS INDEX DOCD: CPT | Performed by: FAMILY MEDICINE

## 2020-02-27 PROCEDURE — 1036F TOBACCO NON-USER: CPT | Performed by: FAMILY MEDICINE

## 2020-02-27 PROCEDURE — 3078F DIAST BP <80 MM HG: CPT | Performed by: FAMILY MEDICINE

## 2020-02-27 PROCEDURE — 3077F SYST BP >= 140 MM HG: CPT | Performed by: FAMILY MEDICINE

## 2020-02-27 RX ORDER — LEVOTHYROXINE SODIUM 0.05 MG/1
50 TABLET ORAL DAILY
Qty: 90 TABLET | Refills: 1 | Status: SHIPPED | OUTPATIENT
Start: 2020-02-27 | End: 2020-06-04 | Stop reason: SDUPTHER

## 2020-02-27 RX ORDER — AMLODIPINE BESYLATE 5 MG/1
5 TABLET ORAL DAILY
Qty: 90 TABLET | Refills: 1 | Status: SHIPPED | OUTPATIENT
Start: 2020-02-27 | End: 2020-09-04 | Stop reason: SDUPTHER

## 2020-02-27 RX ORDER — LISINOPRIL 20 MG/1
20 TABLET ORAL DAILY
Qty: 90 TABLET | Refills: 1 | Status: SHIPPED | OUTPATIENT
Start: 2020-02-27 | End: 2020-06-04
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
## 2020-03-02 ENCOUNTER — APPOINTMENT (OUTPATIENT)
Dept: LAB | Facility: HOSPITAL | Age: 85
End: 2020-03-02
Payer: MEDICARE

## 2020-03-02 ENCOUNTER — HOSPITAL ENCOUNTER (OUTPATIENT)
Dept: RADIOLOGY | Facility: HOSPITAL | Age: 85
Discharge: HOME | End: 2020-03-02
Payer: MEDICARE

## 2020-03-02 DIAGNOSIS — R06.02 SHORTNESS OF BREATH: ICD-10-CM

## 2020-03-02 DIAGNOSIS — E03.9 HYPOTHYROIDISM, UNSPECIFIED TYPE: ICD-10-CM

## 2020-03-02 DIAGNOSIS — I70.213 ATHEROSCLEROSIS OF NATIVE ARTERY OF BOTH LOWER EXTREMITIES WITH INTERMITTENT CLAUDICATION (HCC): ICD-10-CM

## 2020-03-02 DIAGNOSIS — I10 HYPERTENSION, UNSPECIFIED TYPE: ICD-10-CM

## 2020-03-02 DIAGNOSIS — M17.11 PRIMARY OSTEOARTHRITIS OF RIGHT KNEE: ICD-10-CM

## 2020-03-02 LAB
ALBUMIN SERPL BCP-MCNC: 3.6 G/DL (ref 3.5–5)
ALP SERPL-CCNC: 80 U/L (ref 46–116)
ALT SERPL W P-5'-P-CCNC: 30 U/L (ref 12–78)
ANION GAP SERPL CALCULATED.3IONS-SCNC: 9 MMOL/L (ref 4–13)
AST SERPL W P-5'-P-CCNC: 27 U/L (ref 5–45)
BASOPHILS # BLD AUTO: 0.03 THOUSANDS/ΜL (ref 0–0.1)
BASOPHILS NFR BLD AUTO: 1 % (ref 0–1)
BILIRUB SERPL-MCNC: 0.45 MG/DL (ref 0.2–1)
BUN SERPL-MCNC: 15 MG/DL (ref 5–25)
CALCIUM SERPL-MCNC: 9.4 MG/DL (ref 8.3–10.1)
CHLORIDE SERPL-SCNC: 96 MMOL/L (ref 100–108)
CHOLEST SERPL-MCNC: 267 MG/DL (ref 50–200)
CO2 SERPL-SCNC: 28 MMOL/L (ref 21–32)
CREAT SERPL-MCNC: 0.68 MG/DL (ref 0.6–1.3)
EOSINOPHIL # BLD AUTO: 0.15 THOUSAND/ΜL (ref 0–0.61)
EOSINOPHIL NFR BLD AUTO: 3 % (ref 0–6)
ERYTHROCYTE [DISTWIDTH] IN BLOOD BY AUTOMATED COUNT: 13.3 % (ref 11.6–15.1)
GFR SERPL CREATININE-BSD FRML MDRD: 78 ML/MIN/1.73SQ M
GLUCOSE P FAST SERPL-MCNC: 88 MG/DL (ref 65–99)
HCT VFR BLD AUTO: 37.5 % (ref 34.8–46.1)
HDLC SERPL-MCNC: 98 MG/DL
HGB BLD-MCNC: 12.1 G/DL (ref 11.5–15.4)
IMM GRANULOCYTES # BLD AUTO: 0 THOUSAND/UL (ref 0–0.2)
IMM GRANULOCYTES NFR BLD AUTO: 0 % (ref 0–2)
LDLC SERPL CALC-MCNC: 160 MG/DL (ref 0–100)
LYMPHOCYTES # BLD AUTO: 1.27 THOUSANDS/ΜL (ref 0.6–4.47)
LYMPHOCYTES NFR BLD AUTO: 24 % (ref 14–44)
MCH RBC QN AUTO: 29.6 PG (ref 26.8–34.3)
MCHC RBC AUTO-ENTMCNC: 32.3 G/DL (ref 31.4–37.4)
MCV RBC AUTO: 92 FL (ref 82–98)
MONOCYTES # BLD AUTO: 0.38 THOUSAND/ΜL (ref 0.17–1.22)
MONOCYTES NFR BLD AUTO: 7 % (ref 4–12)
NEUTROPHILS # BLD AUTO: 3.56 THOUSANDS/ΜL (ref 1.85–7.62)
NEUTS SEG NFR BLD AUTO: 65 % (ref 43–75)
NONHDLC SERPL-MCNC: 169 MG/DL
NRBC BLD AUTO-RTO: 0 /100 WBCS
PLATELET # BLD AUTO: 245 THOUSANDS/UL (ref 149–390)
PMV BLD AUTO: 12.3 FL (ref 8.9–12.7)
POTASSIUM SERPL-SCNC: 4.2 MMOL/L (ref 3.5–5.3)
PROT SERPL-MCNC: 7.4 G/DL (ref 6.4–8.2)
RBC # BLD AUTO: 4.09 MILLION/UL (ref 3.81–5.12)
SODIUM SERPL-SCNC: 133 MMOL/L (ref 136–145)
TRIGL SERPL-MCNC: 46 MG/DL
TSH SERPL DL<=0.05 MIU/L-ACNC: 2.31 UIU/ML (ref 0.36–3.74)
WBC # BLD AUTO: 5.39 THOUSAND/UL (ref 4.31–10.16)

## 2020-03-02 PROCEDURE — 84443 ASSAY THYROID STIM HORMONE: CPT

## 2020-03-02 PROCEDURE — 80061 LIPID PANEL: CPT

## 2020-03-02 PROCEDURE — 71046 X-RAY EXAM CHEST 2 VIEWS: CPT

## 2020-03-02 PROCEDURE — 85025 COMPLETE CBC W/AUTO DIFF WBC: CPT

## 2020-03-02 PROCEDURE — 80053 COMPREHEN METABOLIC PANEL: CPT

## 2020-03-02 PROCEDURE — 73562 X-RAY EXAM OF KNEE 3: CPT

## 2020-03-02 PROCEDURE — 36415 COLL VENOUS BLD VENIPUNCTURE: CPT

## 2020-03-03 ENCOUNTER — TELEPHONE (OUTPATIENT)
Dept: FAMILY MEDICINE CLINIC | Facility: CLINIC | Age: 85
End: 2020-03-03

## 2020-03-03 NOTE — TELEPHONE ENCOUNTER
----- Message from Shireen Muñiz DO sent at 3/3/2020 11:00 AM EST -----  Call patient    Chest x-ray normal

## 2020-06-04 ENCOUNTER — OFFICE VISIT (OUTPATIENT)
Dept: FAMILY MEDICINE CLINIC | Facility: CLINIC | Age: 85
End: 2020-06-04
Payer: MEDICARE

## 2020-06-04 VITALS
BODY MASS INDEX: 27.4 KG/M2 | WEIGHT: 127 LBS | SYSTOLIC BLOOD PRESSURE: 160 MMHG | HEIGHT: 57 IN | HEART RATE: 76 BPM | DIASTOLIC BLOOD PRESSURE: 68 MMHG

## 2020-06-04 DIAGNOSIS — J45.30 MILD PERSISTENT ASTHMA, UNSPECIFIED WHETHER COMPLICATED: ICD-10-CM

## 2020-06-04 DIAGNOSIS — K21.9 GERD WITHOUT ESOPHAGITIS: Primary | ICD-10-CM

## 2020-06-04 DIAGNOSIS — I10 HYPERTENSION, UNSPECIFIED TYPE: ICD-10-CM

## 2020-06-04 DIAGNOSIS — E03.9 HYPOTHYROIDISM, UNSPECIFIED TYPE: ICD-10-CM

## 2020-06-04 PROCEDURE — 1160F RVW MEDS BY RX/DR IN RCRD: CPT | Performed by: FAMILY MEDICINE

## 2020-06-04 PROCEDURE — 1036F TOBACCO NON-USER: CPT | Performed by: FAMILY MEDICINE

## 2020-06-04 PROCEDURE — 99214 OFFICE O/P EST MOD 30 MIN: CPT | Performed by: FAMILY MEDICINE

## 2020-06-04 PROCEDURE — 4040F PNEUMOC VAC/ADMIN/RCVD: CPT | Performed by: FAMILY MEDICINE

## 2020-06-04 PROCEDURE — 3078F DIAST BP <80 MM HG: CPT | Performed by: FAMILY MEDICINE

## 2020-06-04 PROCEDURE — 3077F SYST BP >= 140 MM HG: CPT | Performed by: FAMILY MEDICINE

## 2020-06-04 PROCEDURE — 3008F BODY MASS INDEX DOCD: CPT | Performed by: FAMILY MEDICINE

## 2020-06-04 RX ORDER — LISINOPRIL AND HYDROCHLOROTHIAZIDE 20; 12.5 MG/1; MG/1
1 TABLET ORAL DAILY
Qty: 90 TABLET | Refills: 3 | Status: SHIPPED | OUTPATIENT
Start: 2020-06-04 | End: 2020-09-04

## 2020-06-04 RX ORDER — ALBUTEROL SULFATE 90 UG/1
2 AEROSOL, METERED RESPIRATORY (INHALATION) 3 TIMES DAILY
Qty: 1 INHALER | Refills: 2 | Status: SHIPPED | OUTPATIENT
Start: 2020-06-04 | End: 2022-03-15 | Stop reason: SDUPTHER

## 2020-06-04 RX ORDER — LISINOPRIL 20 MG/1
20 TABLET ORAL DAILY
Qty: 90 TABLET | Refills: 1 | Status: CANCELLED | OUTPATIENT
Start: 2020-06-04

## 2020-06-04 RX ORDER — LEVOTHYROXINE SODIUM 0.05 MG/1
50 TABLET ORAL DAILY
Qty: 90 TABLET | Refills: 1 | Status: SHIPPED | OUTPATIENT
Start: 2020-06-04 | End: 2020-09-04 | Stop reason: SDUPTHER

## 2020-06-04 RX ORDER — OMEPRAZOLE 20 MG/1
20 CAPSULE, DELAYED RELEASE ORAL
Qty: 90 CAPSULE | Refills: 3 | Status: SHIPPED | OUTPATIENT
Start: 2020-06-04 | End: 2020-09-04

## 2020-06-29 DIAGNOSIS — K21.9 GERD WITHOUT ESOPHAGITIS: Primary | ICD-10-CM

## 2020-06-29 RX ORDER — PANTOPRAZOLE SODIUM 40 MG/1
40 TABLET, DELAYED RELEASE ORAL DAILY
Qty: 90 TABLET | Refills: 1 | Status: SHIPPED | OUTPATIENT
Start: 2020-06-29 | End: 2020-09-04

## 2020-07-17 ENCOUNTER — TELEPHONE (OUTPATIENT)
Dept: FAMILY MEDICINE CLINIC | Facility: CLINIC | Age: 85
End: 2020-07-17

## 2020-07-21 DIAGNOSIS — I10 HYPERTENSION, UNSPECIFIED TYPE: Primary | ICD-10-CM

## 2020-07-21 DIAGNOSIS — K21.9 GERD WITHOUT ESOPHAGITIS: ICD-10-CM

## 2020-07-21 RX ORDER — SUCRALFATE 1 G/1
1 TABLET ORAL 4 TIMES DAILY
Qty: 120 TABLET | Refills: 2 | Status: SHIPPED | OUTPATIENT
Start: 2020-07-21 | End: 2020-09-04

## 2020-07-21 RX ORDER — LISINOPRIL 20 MG/1
20 TABLET ORAL DAILY
Qty: 90 TABLET | Refills: 1 | Status: SHIPPED | OUTPATIENT
Start: 2020-07-21 | End: 2020-09-04 | Stop reason: SDUPTHER

## 2020-07-21 NOTE — TELEPHONE ENCOUNTER
Change to Carafate 1 g 4 times daily number 120 with 2 refills, lisinopril 20 mg daily 90 with 1 refill

## 2020-07-27 DIAGNOSIS — K21.9 GERD WITHOUT ESOPHAGITIS: Primary | ICD-10-CM

## 2020-07-27 RX ORDER — DEXLANSOPRAZOLE 60 MG/1
60 CAPSULE, DELAYED RELEASE ORAL DAILY
Qty: 30 CAPSULE | Refills: 2 | Status: SHIPPED | OUTPATIENT
Start: 2020-07-27 | End: 2020-09-04

## 2020-07-27 NOTE — TELEPHONE ENCOUNTER
PT STATES SHE DOES NOT WANT TO TAKE THE CARAFATE  SHE SAID IT INTERFERS WITH HER OTHER MEDS AND IT IS TOO COMPLICATED FOR HER TO TAKE QID  CAN YOU SUBSTITUTE THE MED FOR SOMETHING ELSE  [address]

## 2020-08-10 ENCOUNTER — APPOINTMENT (OUTPATIENT)
Dept: LAB | Facility: MEDICAL CENTER | Age: 85
End: 2020-08-10
Payer: MEDICARE

## 2020-08-10 ENCOUNTER — OFFICE VISIT (OUTPATIENT)
Dept: GASTROENTEROLOGY | Facility: MEDICAL CENTER | Age: 85
End: 2020-08-10
Payer: MEDICARE

## 2020-08-10 VITALS
TEMPERATURE: 98.7 F | SYSTOLIC BLOOD PRESSURE: 159 MMHG | DIASTOLIC BLOOD PRESSURE: 108 MMHG | HEART RATE: 97 BPM | HEIGHT: 58 IN | BODY MASS INDEX: 25.19 KG/M2 | WEIGHT: 120 LBS

## 2020-08-10 DIAGNOSIS — R14.2 BURPING: Primary | ICD-10-CM

## 2020-08-10 DIAGNOSIS — R63.0 LOSS OF APPETITE: ICD-10-CM

## 2020-08-10 DIAGNOSIS — R63.4 WEIGHT LOSS: ICD-10-CM

## 2020-08-10 DIAGNOSIS — M65.342 TRIGGER RING FINGER OF LEFT HAND: ICD-10-CM

## 2020-08-10 DIAGNOSIS — I10 ESSENTIAL HYPERTENSION: ICD-10-CM

## 2020-08-10 LAB
ANION GAP SERPL CALCULATED.3IONS-SCNC: 6 MMOL/L (ref 4–13)
BASOPHILS # BLD AUTO: 0.02 THOUSANDS/ΜL (ref 0–0.1)
BASOPHILS NFR BLD AUTO: 0 % (ref 0–1)
BUN SERPL-MCNC: 17 MG/DL (ref 5–25)
CALCIUM SERPL-MCNC: 9.1 MG/DL (ref 8.3–10.1)
CHLORIDE SERPL-SCNC: 104 MMOL/L (ref 100–108)
CO2 SERPL-SCNC: 27 MMOL/L (ref 21–32)
CREAT SERPL-MCNC: 0.78 MG/DL (ref 0.6–1.3)
EOSINOPHIL # BLD AUTO: 0.15 THOUSAND/ΜL (ref 0–0.61)
EOSINOPHIL NFR BLD AUTO: 3 % (ref 0–6)
ERYTHROCYTE [DISTWIDTH] IN BLOOD BY AUTOMATED COUNT: 13.3 % (ref 11.6–15.1)
GFR SERPL CREATININE-BSD FRML MDRD: 68 ML/MIN/1.73SQ M
GLUCOSE P FAST SERPL-MCNC: 88 MG/DL (ref 65–99)
HCT VFR BLD AUTO: 37.5 % (ref 34.8–46.1)
HGB BLD-MCNC: 12.2 G/DL (ref 11.5–15.4)
IMM GRANULOCYTES # BLD AUTO: 0.02 THOUSAND/UL (ref 0–0.2)
IMM GRANULOCYTES NFR BLD AUTO: 0 % (ref 0–2)
LYMPHOCYTES # BLD AUTO: 1.34 THOUSANDS/ΜL (ref 0.6–4.47)
LYMPHOCYTES NFR BLD AUTO: 22 % (ref 14–44)
MCH RBC QN AUTO: 29.9 PG (ref 26.8–34.3)
MCHC RBC AUTO-ENTMCNC: 32.5 G/DL (ref 31.4–37.4)
MCV RBC AUTO: 92 FL (ref 82–98)
MONOCYTES # BLD AUTO: 0.39 THOUSAND/ΜL (ref 0.17–1.22)
MONOCYTES NFR BLD AUTO: 7 % (ref 4–12)
NEUTROPHILS # BLD AUTO: 4.1 THOUSANDS/ΜL (ref 1.85–7.62)
NEUTS SEG NFR BLD AUTO: 68 % (ref 43–75)
NRBC BLD AUTO-RTO: 0 /100 WBCS
PLATELET # BLD AUTO: 163 THOUSANDS/UL (ref 149–390)
PMV BLD AUTO: 12.2 FL (ref 8.9–12.7)
POTASSIUM SERPL-SCNC: 4.4 MMOL/L (ref 3.5–5.3)
RBC # BLD AUTO: 4.08 MILLION/UL (ref 3.81–5.12)
SODIUM SERPL-SCNC: 137 MMOL/L (ref 136–145)
WBC # BLD AUTO: 6.02 THOUSAND/UL (ref 4.31–10.16)

## 2020-08-10 PROCEDURE — 85025 COMPLETE CBC W/AUTO DIFF WBC: CPT

## 2020-08-10 PROCEDURE — 99204 OFFICE O/P NEW MOD 45 MIN: CPT | Performed by: INTERNAL MEDICINE

## 2020-08-10 PROCEDURE — 80048 BASIC METABOLIC PNL TOTAL CA: CPT

## 2020-08-10 PROCEDURE — 36415 COLL VENOUS BLD VENIPUNCTURE: CPT

## 2020-08-10 NOTE — PROGRESS NOTES
[facility] - Outpatient Consultation  Bath 80 y o  female MRN: [id number]  Encounter: [id number]          ASSESSMENT AND PLAN:      1  Burping    2  Weight loss  3  Loss of appetite    Her new onset burping symptoms could be secondary to HH/ acid reflux, diaphragmatic lesion, GE junction lesion or functional  She denies dysphagia and some decrease of appetite  We discussed the benefits and risks of EGD to investigate and she expressed reluctance to undergo anesthesia  I would recommend CT with po and iv contrast to rele out diaphragmatic lesions, GE junction lesions  Patient and daughter agreed  - CT chest and abdomen w contrast; Future    Follow up after CT    ______________________________________________________________________    HPI:      24-year-old female referred for evaluation of refractory burping  Patient reported she has been started intermittent burping since early June  She reported when she wake up early in the morning she noticed symptoms  This is not persistent  And it is not associated with food intake  She denies abdominal pain, fever and chills  She was accompanied by her daughter  Her daughter reported patient had history of acid reflux and she has been taking Nexium for few years and recently has stopped  She denies dysphagia  Denies food stuck in her chest   Daughter reported patient has mild an intentional weight loss  She reported she has lost some appetite  She was prescribed with PPI but has not started taking it  REVIEW OF SYSTEMS:    CONSTITUTIONAL: Denies any fever, chills, rigors, and weight loss  HEENT: No earache or tinnitus  Denies hearing loss or visual disturbances  CARDIOVASCULAR: No chest pain or palpitations  RESPIRATORY: Denies any cough, hemoptysis, shortness of breath or dyspnea on exertion  GASTROINTESTINAL: As noted in the History of Present Illness  GENITOURINARY: No problems with urination   Denies any hematuria or dysuria  NEUROLOGIC: No dizziness or vertigo, denies headaches  MUSCULOSKELETAL: Denies any muscle or joint pain  SKIN: Denies skin rashes or itching  ENDOCRINE: Denies excessive thirst  Denies intolerance to heat or cold  PSYCHOSOCIAL: Denies depression or anxiety  Denies any recent memory loss         Historical Information   Past Medical History:   Diagnosis Date    Anxiety     Arthritis     OA    Asthma     good control    Burn     Chronic pain disorder     spinal stenosis    GERD (gastroesophageal reflux disease)     off and on    Glaucoma     Hiatal hernia     HLD (hyperlipidemia)     Hx of fall 2015    Hypertension     PVD (peripheral vascular disease) ([facility] [address] [facility]     Thyroid disease     hypo     Past Surgical History:   Procedure Laterality Date    ANGIOPLASTY      jacquelyn lower legs    BLADDER SUSPENSION      with mesh    CATARACT EXTRACTION Bilateral     COLONOSCOPY      COLONOSCOPY W/ POLYPECTOMY      via Colostomy; Pt does not have colostomy - no listing for a polypectomy of colon per [employer]    DILATION AND CURETTAGE OF UTERUS      NASAL POLYP EXCISION      Nasal Endoscopy Polypectomy    OTHER SURGICAL HISTORY  04/2007    Uterine prolapse and repair     [place] REVISE MEDIAN N/CARPAL TUNNEL SURG Left 1/10/2019    Procedure: RELEASE LEFT CARPAL TUNNEL;  Surgeon: Jaswant Marshall MD;  Location:  MAIN OR;  Service: Orthopedics     Social History   Social History     Substance and Sexual Activity   Alcohol Use Yes    Alcohol/week: 1 0 standard drinks    Types: 1 Glasses of wine per week    Comment: rare     Social History     Substance and Sexual Activity   Drug Use No     Social History     Tobacco Use   Smoking Status Never Smoker   Smokeless Tobacco Never Used     Family History   Problem Relation Age of Onset    Other Family         Back Pain     Hypertension Family     Thyroid disease Family         Disorder       Meds/Allergies       Current Outpatient Medications:    albuterol (Ventolin HFA) 90 mcg/act inhaler    amLODIPine (NORVASC) 5 mg tablet    aspirin 81 MG tablet    brimonidine tartrate 0 2 % ophthalmic solution    CALCIUM PO    dexlansoprazole (Dexilant) 60 MG capsule    fluticasone-salmeterol (ADVAIR, WIXELA) 250-50 mcg/dose inhaler    latanoprost (XALATAN) 0 005 % ophthalmic solution    levothyroxine 50 mcg tablet    lisinopril (ZESTRIL) 20 mg tablet    lisinopril-hydrochlorothiazide (PRINZIDE,ZESTORETIC) 20-12 5 MG per tablet    Omega-3 Fatty Acids (EQL OMEGA 3 FISH OIL) 1200 MG CAPS    dexlansoprazole (DEXILANT) 60 MG capsule    omeprazole (PriLOSEC) 20 mg delayed release capsule    pantoprazole (PROTONIX) 40 mg tablet    sucralfate (CARAFATE) 1 g tablet    Allergies   Allergen Reactions    Cephalexin Nausea Only    Erythromycin     Phenazopyridine     Piroxicam     Sulfamethoxazole-Trimethoprim            Objective     Blood pressure (!) 159/108, pulse 97, temperature 98 7 °F (37 1 °C), temperature source Tympanic, height 4' 9 5" (1 461 m), weight 54 4 kg (120 lb), not currently breastfeeding  Body mass index is 25 52 kg/m²  PHYSICAL EXAM:      General Appearance:   Alert, cooperative, no distress   HEENT:   Normocephalic, atraumatic, anicteric      Neck:  Supple, symmetrical, trachea midline   Lungs:   Clear to auscultation bilaterally; no rales, rhonchi or wheezing; respirations unlabored    Heart[de-identified]   Regular rate and rhythm; no murmur, rub, or gallop  Abdomen:   Soft, non-tender, non-distended; normal bowel sounds; no masses, no organomegaly    Genitalia:   Deferred    Rectal:   Deferred    Extremities:  No cyanosis, clubbing, mild edema of LLE    Pulses:  2+ and symmetric    Skin:  No jaundice, rashes, or lesions    Lymph nodes:  No palpable cervical lymphadenopathy        Lab Results:   No visits with results within 1 Day(s) from this visit     Latest known visit with results is:   Appointment on 03/02/2020   Component Date Value    WBC 03/02/2020 5 39     RBC 03/02/2020 4 09     Hemoglobin 03/02/2020 12 1     Hematocrit 03/02/2020 37 5     MCV 03/02/2020 92     MCH 03/02/2020 29 6     MCHC 03/02/2020 32 3     RDW 03/02/2020 13 3     MPV 03/02/2020 12 3     Platelets 46/16/8770 245     nRBC 03/02/2020 0     Neutrophils Relative 03/02/2020 65     Immat GRANS % 03/02/2020 0     Lymphocytes Relative 03/02/2020 24     Monocytes Relative 03/02/2020 7     Eosinophils Relative 03/02/2020 3     Basophils Relative 03/02/2020 1     Neutrophils Absolute 03/02/2020 3 56     Immature Grans Absolute 03/02/2020 0 00     Lymphocytes Absolute 03/02/2020 1 27     Monocytes Absolute 03/02/2020 0 38     Eosinophils Absolute 03/02/2020 0 15     Basophils Absolute 03/02/2020 0 03     Sodium 03/02/2020 133*    Potassium 03/02/2020 4 2     Chloride 03/02/2020 96*    CO2 03/02/2020 28     ANION GAP 03/02/2020 9     BUN 03/02/2020 15     Creatinine 03/02/2020 0 68     Glucose, Fasting 03/02/2020 88     Calcium 03/02/2020 9 4     AST 03/02/2020 27     ALT 03/02/2020 30     Alkaline Phosphatase 03/02/2020 80     Total Protein 03/02/2020 7 4     Albumin 03/02/2020 3 6     Total Bilirubin 03/02/2020 0 45     eGFR 03/02/2020 78     Cholesterol 03/02/2020 267*    Triglycerides 03/02/2020 46     HDL, Direct 03/02/2020 98     LDL Calculated 03/02/2020 160*    Non-HDL-Chol (CHOL-HDL) 03/02/2020 169     TSH 3RD GENERATON 03/02/2020 2 312          Radiology Results:   No results found

## 2020-08-19 ENCOUNTER — HOSPITAL ENCOUNTER (OUTPATIENT)
Dept: CT IMAGING | Facility: HOSPITAL | Age: 85
Discharge: HOME | End: 2020-08-19
Attending: INTERNAL MEDICINE
Payer: MEDICARE

## 2020-08-19 DIAGNOSIS — R14.2 BURPING: ICD-10-CM

## 2020-08-19 PROCEDURE — 71260 CT THORAX DX C+: CPT

## 2020-08-19 PROCEDURE — G1004 CDSM NDSC: HCPCS

## 2020-08-19 PROCEDURE — 74160 CT ABDOMEN W/CONTRAST: CPT

## 2020-08-19 RX ADMIN — IOHEXOL 90 ML: 350 INJECTION, SOLUTION INTRAVENOUS at 16:39

## 2020-09-04 ENCOUNTER — OFFICE VISIT (OUTPATIENT)
Dept: FAMILY MEDICINE CLINIC | Facility: CLINIC | Age: 85
End: 2020-09-04
Payer: MEDICARE

## 2020-09-04 VITALS
TEMPERATURE: 97.9 F | HEIGHT: 58 IN | BODY MASS INDEX: 25.57 KG/M2 | SYSTOLIC BLOOD PRESSURE: 124 MMHG | WEIGHT: 121.8 LBS | DIASTOLIC BLOOD PRESSURE: 68 MMHG

## 2020-09-04 DIAGNOSIS — I10 HYPERTENSION, UNSPECIFIED TYPE: ICD-10-CM

## 2020-09-04 DIAGNOSIS — E03.9 HYPOTHYROIDISM, UNSPECIFIED TYPE: ICD-10-CM

## 2020-09-04 DIAGNOSIS — K21.9 GERD WITHOUT ESOPHAGITIS: Primary | ICD-10-CM

## 2020-09-04 DIAGNOSIS — K44.9 HIATAL HERNIA: ICD-10-CM

## 2020-09-04 DIAGNOSIS — I10 ESSENTIAL HYPERTENSION: ICD-10-CM

## 2020-09-04 DIAGNOSIS — J45.30 MILD PERSISTENT ASTHMA, UNSPECIFIED WHETHER COMPLICATED: ICD-10-CM

## 2020-09-04 PROCEDURE — 99214 OFFICE O/P EST MOD 30 MIN: CPT | Performed by: FAMILY MEDICINE

## 2020-09-04 RX ORDER — LISINOPRIL 20 MG/1
20 TABLET ORAL DAILY
Qty: 90 TABLET | Refills: 1 | Status: SHIPPED | OUTPATIENT
Start: 2020-09-04

## 2020-09-04 RX ORDER — ONDANSETRON 4 MG/1
4 TABLET, FILM COATED ORAL EVERY 8 HOURS PRN
Qty: 20 TABLET | Refills: 2 | Status: SHIPPED | OUTPATIENT
Start: 2020-09-04 | End: 2021-07-19 | Stop reason: ALTCHOICE

## 2020-09-04 RX ORDER — AMLODIPINE BESYLATE 5 MG/1
5 TABLET ORAL DAILY
Qty: 90 TABLET | Refills: 1 | Status: SHIPPED | OUTPATIENT
Start: 2020-09-04

## 2020-09-04 RX ORDER — LEVOTHYROXINE SODIUM 0.05 MG/1
50 TABLET ORAL DAILY
Qty: 90 TABLET | Refills: 1 | Status: SHIPPED | OUTPATIENT
Start: 2020-09-04

## 2020-09-04 NOTE — PROGRESS NOTES
Assessment/Plan:  CT scan of the chest and abdomen discussed with the patient this showed mild hiatal hernia the  Patient will decrease to me know intake  Patient use Zofran as needed  Guidance given overall  Refills given at this time  Follow-up 3 months  Diagnoses and all orders for this visit:    GERD without esophagitis  -     ondansetron (ZOFRAN) 4 mg tablet; Take 1 tablet (4 mg total) by mouth every 8 (eight) hours as needed for nausea or vomiting    Hypothyroidism, unspecified type  -     amLODIPine (NORVASC) 5 mg tablet; Take 1 tablet (5 mg total) by mouth daily  -     levothyroxine 50 mcg tablet; Take 1 tablet (50 mcg total) by mouth daily    Essential hypertension    Hiatal hernia    Mild persistent asthma, unspecified whether complicated  -     fluticasone-salmeterol (ADVAIR, WIXELA) 250-50 mcg/dose inhaler; Inhale 1 puff 2 (two) times a day Rinse mouth after use  -     amLODIPine (NORVASC) 5 mg tablet; Take 1 tablet (5 mg total) by mouth daily    Hypertension, unspecified type  -     amLODIPine (NORVASC) 5 mg tablet; Take 1 tablet (5 mg total) by mouth daily  -     lisinopril (ZESTRIL) 20 mg tablet; Take 1 tablet (20 mg total) by mouth daily            Subjective:        Patient ID: Nani Latham is a 80 y o  female  Patient follow-up on GERD hypothyroidism and hypertension  Patient tried multiple medications for GERD and for belching and nausea without significant improvement  Patient stop these medications  Patient is taking meds for hypertension as well as hypothyroidism  Meds reviewed  No chest pain noted  Patient does notice some upper abdominal discomfort  No shortness of breath  No edema        The following portions of the patient's history were reviewed and updated as appropriate: allergies, current medications, past family history, past medical history, past social history, past surgical history and problem list       Review of Systems   Constitutional: Negative      HENT: Negative  Eyes: Negative  Respiratory: Negative  Cardiovascular: Negative  Gastrointestinal: Positive for abdominal pain and nausea  GERD   Endocrine: Negative  Genitourinary: Negative  Musculoskeletal: Negative  Skin: Negative  Allergic/Immunologic: Negative  Neurological: Negative  Hematological: Negative  Psychiatric/Behavioral: Negative  Objective:               /68 (BP Location: Right arm, Patient Position: Sitting, Cuff Size: Standard)   Temp 97 9 °F (36 6 °C) (Tympanic)   Ht 4' 9 5" (1 461 m)   Wt 55 2 kg (121 lb 12 8 oz)   BMI 25 90 kg/m²          Physical Exam  Vitals signs and nursing note reviewed  Constitutional:       General: She is not in acute distress  Appearance: Normal appearance  She is well-developed  She is not diaphoretic  HENT:      Head: Normocephalic and atraumatic  Right Ear: External ear normal       Left Ear: External ear normal       Mouth/Throat:      Pharynx: No oropharyngeal exudate  Eyes:      General: No scleral icterus  Right eye: No discharge  Left eye: No discharge  Pupils: Pupils are equal, round, and reactive to light  Neck:      Musculoskeletal: Normal range of motion and neck supple  Thyroid: No thyromegaly  Cardiovascular:      Rate and Rhythm: Normal rate and regular rhythm  Heart sounds: Normal heart sounds  No murmur  No friction rub  No gallop  Pulmonary:      Effort: Pulmonary effort is normal  No respiratory distress  Breath sounds: Normal breath sounds  No wheezing or rales  Chest:      Chest wall: No tenderness  Abdominal:      General: Bowel sounds are normal  There is no distension  Palpations: Abdomen is soft  Tenderness: There is no abdominal tenderness  There is no guarding or rebound  Musculoskeletal: Normal range of motion  General: No tenderness  Lymphadenopathy:      Cervical: No cervical adenopathy     Skin: General: Skin is warm and dry  Coloration: Skin is not pale  Findings: No erythema or rash  Neurological:      Mental Status: She is alert and oriented to person, place, and time  Cranial Nerves: No cranial nerve deficit  Motor: No abnormal muscle tone  Coordination: Coordination normal    Psychiatric:         Behavior: Behavior normal          Thought Content:  Thought content normal          Judgment: Judgment normal

## 2020-10-27 ENCOUNTER — OFFICE VISIT (OUTPATIENT)
Dept: FAMILY MEDICINE CLINIC | Facility: CLINIC | Age: 85
End: 2020-10-27
Payer: MEDICARE

## 2020-10-27 VITALS
DIASTOLIC BLOOD PRESSURE: 72 MMHG | HEART RATE: 84 BPM | SYSTOLIC BLOOD PRESSURE: 108 MMHG | BODY MASS INDEX: 25.14 KG/M2 | WEIGHT: 118.2 LBS

## 2020-10-27 DIAGNOSIS — Z23 ENCOUNTER FOR IMMUNIZATION: Primary | ICD-10-CM

## 2020-10-27 DIAGNOSIS — Z20.822 COVID-19 RULED OUT: ICD-10-CM

## 2020-10-27 DIAGNOSIS — Z00.00 MEDICARE ANNUAL WELLNESS VISIT, SUBSEQUENT: ICD-10-CM

## 2020-10-27 PROCEDURE — 99214 OFFICE O/P EST MOD 30 MIN: CPT | Performed by: FAMILY MEDICINE

## 2020-10-27 PROCEDURE — 86580 TB INTRADERMAL TEST: CPT

## 2020-10-29 ENCOUNTER — TELEPHONE (OUTPATIENT)
Dept: FAMILY MEDICINE CLINIC | Facility: CLINIC | Age: 85
End: 2020-10-29

## 2020-10-29 LAB
INDURATION: 0 MM
TB SKIN TEST: NEGATIVE

## 2020-10-30 DIAGNOSIS — Z20.822 COVID-19 RULED OUT: ICD-10-CM

## 2020-10-30 PROCEDURE — U0003 INFECTIOUS AGENT DETECTION BY NUCLEIC ACID (DNA OR RNA); SEVERE ACUTE RESPIRATORY SYNDROME CORONAVIRUS 2 (SARS-COV-2) (CORONAVIRUS DISEASE [COVID-19]), AMPLIFIED PROBE TECHNIQUE, MAKING USE OF HIGH THROUGHPUT TECHNOLOGIES AS DESCRIBED BY CMS-2020-01-R: HCPCS | Performed by: FAMILY MEDICINE

## 2020-10-31 LAB — SARS-COV-2 RNA SPEC QL NAA+PROBE: NOT DETECTED

## 2020-11-02 DIAGNOSIS — A15.9 TUBERCULOSIS: Primary | ICD-10-CM

## 2020-11-03 ENCOUNTER — CLINICAL SUPPORT (OUTPATIENT)
Dept: FAMILY MEDICINE CLINIC | Facility: CLINIC | Age: 85
End: 2020-11-03
Payer: MEDICARE

## 2020-11-03 DIAGNOSIS — Z11.1 TUBERCULOSIS SCREENING: Primary | ICD-10-CM

## 2020-11-03 PROCEDURE — 86580 TB INTRADERMAL TEST: CPT

## 2020-11-04 DIAGNOSIS — G56.02 CARPAL TUNNEL SYNDROME OF LEFT WRIST: Primary | ICD-10-CM

## 2020-11-05 ENCOUNTER — CLINICAL SUPPORT (OUTPATIENT)
Dept: FAMILY MEDICINE CLINIC | Facility: CLINIC | Age: 85
End: 2020-11-05
Payer: MEDICARE

## 2020-11-05 DIAGNOSIS — Z11.1 ENCOUNTER FOR PPD TEST: Primary | ICD-10-CM

## 2020-11-05 PROCEDURE — 99211 OFF/OP EST MAY X REQ PHY/QHP: CPT

## 2021-02-16 ENCOUNTER — OFFICE VISIT (OUTPATIENT)
Dept: FAMILY MEDICINE CLINIC | Facility: CLINIC | Age: 86
End: 2021-02-16
Payer: MEDICARE

## 2021-02-16 VITALS
TEMPERATURE: 97.4 F | HEIGHT: 58 IN | SYSTOLIC BLOOD PRESSURE: 132 MMHG | WEIGHT: 129 LBS | DIASTOLIC BLOOD PRESSURE: 60 MMHG | BODY MASS INDEX: 27.08 KG/M2

## 2021-02-16 DIAGNOSIS — E03.9 HYPOTHYROIDISM, UNSPECIFIED TYPE: Primary | ICD-10-CM

## 2021-02-16 DIAGNOSIS — Z00.00 MEDICARE ANNUAL WELLNESS VISIT, SUBSEQUENT: ICD-10-CM

## 2021-02-16 DIAGNOSIS — I10 ESSENTIAL HYPERTENSION: ICD-10-CM

## 2021-02-16 DIAGNOSIS — L30.9 DERMATITIS: ICD-10-CM

## 2021-02-16 DIAGNOSIS — K21.9 GERD WITHOUT ESOPHAGITIS: ICD-10-CM

## 2021-02-16 DIAGNOSIS — I70.213 ATHEROSCLEROSIS OF NATIVE ARTERY OF BOTH LOWER EXTREMITIES WITH INTERMITTENT CLAUDICATION (HCC): ICD-10-CM

## 2021-02-16 DIAGNOSIS — E78.2 MIXED HYPERLIPIDEMIA: ICD-10-CM

## 2021-02-16 PROCEDURE — 1123F ACP DISCUSS/DSCN MKR DOCD: CPT | Performed by: FAMILY MEDICINE

## 2021-02-16 PROCEDURE — 99214 OFFICE O/P EST MOD 30 MIN: CPT | Performed by: FAMILY MEDICINE

## 2021-02-16 PROCEDURE — G0439 PPPS, SUBSEQ VISIT: HCPCS | Performed by: FAMILY MEDICINE

## 2021-02-16 RX ORDER — MOMETASONE FUROATE 1 MG/G
CREAM TOPICAL DAILY
Qty: 45 G | Refills: 0 | Status: SHIPPED | OUTPATIENT
Start: 2021-02-16 | End: 2022-05-18

## 2021-02-16 NOTE — PROGRESS NOTES
Assessment and Plan:     Problem List Items Addressed This Visit     None           Preventive health issues were discussed with patient, and age appropriate screening tests were ordered as noted in patient's After Visit Summary  Personalized health advice and appropriate referrals for health education or preventive services given if needed, as noted in patient's After Visit Summary       History of Present Illness:     Patient presents for Medicare Annual Wellness visit    Patient Care Team:  Vero Brandon DO as PCP - General (Family Medicine)  Beth Brownlee DO     Problem List:     Patient Active Problem List   Diagnosis    Anemia    Asthma    Atherosclerosis of native artery of both lower extremities with intermittent claudication ([address] )    Carotid atherosclerosis    Complete tear of right rotator cuff    Disc degeneration, lumbar    GERD without esophagitis    Glaucoma    Hyperlipidemia    Hypertension    Hypothyroidism    Lumbar canal stenosis    Peripheral neuropathy    Spondylosis of lumbar region without myelopathy or radiculopathy    Incomplete tear of left rotator cuff    Trigger little finger of left hand    Slow transit constipation    Carpal tunnel syndrome of left wrist    Carpal tunnel syndrome, left    Preop cardiovascular exam    Cellulitis of toe of right foot    Age-related osteoporosis without current pathological fracture    Lumbar back pain    Trigger ring finger of left hand    Trigger finger, right ring finger    Trigger finger, left little finger    Shortness of breath    Primary osteoarthritis of right knee    Mixed stress and urge urinary incontinence    Hiatal hernia      Past Medical and Surgical History:     Past Medical History:   Diagnosis Date    Anxiety     Arthritis     OA    Asthma     good control    Burn     Chronic pain disorder     spinal stenosis    GERD (gastroesophageal reflux disease)     off and on    Glaucoma     Hiatal hernia     HLD (hyperlipidemia)     Hx of fall 2015    Hypertension     [facility]) [facility] [address] [facility]     Thyroid disease     hypo     Past Surgical History:   Procedure Laterality Date    ANGIOPLASTY      jacquelyn lower legs    BLADDER SUSPENSION      with mesh    CATARACT EXTRACTION Bilateral     COLONOSCOPY      COLONOSCOPY W/ POLYPECTOMY      via Colostomy; Pt does not have colostomy - no listing for a polypectomy of colon per [employer]    DILATION AND CURETTAGE OF UTERUS      NASAL POLYP EXCISION      Nasal Endoscopy Polypectomy    OTHER SURGICAL HISTORY  04/2007    Uterine prolapse and repair     [place] REVISE MEDIAN N/CARPAL TUNNEL SURG Left 1/10/2019    Procedure: RELEASE LEFT CARPAL TUNNEL;  Surgeon: Aaliyah Mansfield MD;  Location: [address] [place] [address] OR;  Service: Orthopedics      Family History:     Family History   Problem Relation Age of Onset    Other Family         Back Pain     Hypertension Family     Thyroid disease Family         Disorder      Social History:     E-Cigarette/Vaping    E-Cigarette Use Never User      E-Cigarette/Vaping Substances    Nicotine No     THC No     CBD No     Flavoring No     Other No     Unknown No      Social History     Socioeconomic History    Marital status:      Spouse name: None    Number of children: None    Years of education: None    Highest education level: None   Occupational History    None   Social Needs    Financial resource strain: None    Food insecurity     Worry: None     Inability: None    Transportation needs     Medical: None     Non-medical: None   Tobacco Use    Smoking status: Never Smoker    Smokeless tobacco: Never Used   Substance and Sexual Activity    Alcohol use:  Yes     Alcohol/week: 1 0 standard drinks     Types: 1 Glasses of wine per week     Comment: rare    Drug use: No    Sexual activity: Never   Lifestyle    Physical activity     Days per week: None     Minutes per session: None    Stress: None   Relationships    Social connections     Talks on phone: None     Gets together: None     Attends Orthodoxy service: None     Active member of club or organization: None     Attends meetings of clubs or organizations: None     Relationship status: None    Intimate partner violence     Fear of current or ex partner: None     Emotionally abused: None     Physically abused: None     Forced sexual activity: None   Other Topics Concern    None   Social History Narrative    None      Medications and Allergies:     Current Outpatient Medications   Medication Sig Dispense Refill    albuterol (Ventolin HFA) 90 mcg/act inhaler Inhale 2 puffs 3 (three) times a day 1 Inhaler 2    amLODIPine (NORVASC) 5 mg tablet Take 1 tablet (5 mg total) by mouth daily 90 tablet 1    aspirin 81 MG tablet Take 81 mg by mouth daily        brimonidine tartrate 0 2 % ophthalmic solution Administer 1 drop to both eyes 2 (two) times a day        CALCIUM PO Take by mouth      fluticasone-salmeterol (ADVAIR, WIXELA) 250-50 mcg/dose inhaler Inhale 1 puff 2 (two) times a day Rinse mouth after use  1 Inhaler 2    latanoprost (XALATAN) 0 005 % ophthalmic solution Administer 1 drop to both eyes daily at bedtime        levothyroxine 50 mcg tablet Take 1 tablet (50 mcg total) by mouth daily 90 tablet 1    lisinopril (ZESTRIL) 20 mg tablet Take 1 tablet (20 mg total) by mouth daily 90 tablet 1    Omega-3 Fatty Acids (EQL OMEGA 3 FISH OIL) 1200 MG CAPS Take by mouth      ondansetron (ZOFRAN) 4 mg tablet Take 1 tablet (4 mg total) by mouth every 8 (eight) hours as needed for nausea or vomiting 20 tablet 2     No current facility-administered medications for this visit        Allergies   Allergen Reactions    Cephalexin Nausea Only    Erythromycin     Phenazopyridine     Piroxicam     Sulfamethoxazole-Trimethoprim       Immunizations:     Immunization History   Administered Date(s) Administered    Influenza Split High Dose Preservative Free IM 10/27/2016, 10/31/2019    Pneumococcal Conjugate 13-Valent 04/10/2014, 12/22/2016    Pneumococcal Polysaccharide PPV23 04/15/1996, 01/03/2008, 05/22/2019    Tdap 09/18/2019    Tuberculin Skin Test-PPD Intradermal 10/27/2020, 11/03/2020      Health Maintenance: There are no preventive care reminders to display for this patient  Topic Date Due    Influenza Vaccine (1) 09/01/2020      Medicare Health Risk Assessment:     /60 (BP Location: Right arm, Patient Position: Sitting, Cuff Size: Adult)   Temp (!) 97 4 °F (36 3 °C) (Tympanic)   Ht 4' 9 5" (1 461 m)   Wt 58 5 kg (129 lb)   BMI 27 43 kg/m²      Marion Johansen is here for her Subsequent Wellness visit  Last Medicare Wellness visit information reviewed, patient interviewed, no change since last AWV  Health Risk Assessment:   Patient rates overall health as fair  Patient feels that their physical health rating is same  Eyesight was rated as same  Hearing was rated as slightly worse  Patient feels that their emotional and mental health rating is slightly better  Pain experienced in the last 7 days has been some  Patient's pain rating has been 3/10  Patient states that she has experienced weight loss or gain in last 6 months  Depression Screening:   PHQ-2 Score: 1      Fall Risk Screening: In the past year, patient has experienced: history of falling in past year    Number of falls: 2 or more  Injured during fall?: Yes    Feels unsteady when standing or walking?: No    Worried about falling?: Yes      Urinary Incontinence Screening:   Patient has leaked urine accidently in the last six months  Patient is more incontinent at night  Home Safety:  Patient does not have trouble with stairs inside or outside of their home  Patient has working smoke alarms and has working carbon monoxide detector  Home safety hazards include: none  Medications:   Patient is not currently taking any over-the-counter supplements  Patient is not able to manage medications  Activities of Daily Living (ADLs)/Instrumental Activities of Daily Living (IADLs):   Walk and transfer into and out of bed and chair?: Yes  Dress and groom yourself?: Yes    Bathe or shower yourself?: Yes    Feed yourself? Yes  Do your laundry/housekeeping?: No  Manage your money, pay your bills and track your expenses?: Yes  Make your own meals?: No    Do your own shopping?: No    Previous Hospitalizations:   Any hospitalizations or ED visits within the last 12 months?: No      Advance Care Planning:   Living will: Yes    Durable POA for healthcare: Yes    Advanced directive: Yes      Cognitive Screening:   Provider or family/friend/caregiver concerned regarding cognition?: No    PREVENTIVE SCREENINGS      Cardiovascular Screening:    General: Screening Not Indicated, History Lipid Disorder, Risks and Benefits Discussed and Screening Current      Diabetes Screening:     General: Screening Current and Risks and Benefits Discussed      Colorectal Cancer Screening:     General: Screening Not Indicated, Risks and Benefits Discussed and Screening Current      Breast Cancer Screening:     General: Risks and Benefits Discussed and Screening Not Indicated      Cervical Cancer Screening:    General: Screening Not Indicated and Risks and Benefits Discussed      Osteoporosis Screening:    General: Screening Not Indicated, History Osteoporosis, Risks and Benefits Discussed and Screening Current      Abdominal Aortic Aneurysm (AAA) Screening:        General: Risks and Benefits Discussed and Screening Not Indicated      Lung Cancer Screening:     General: Screening Not Indicated and Risks and Benefits Discussed      Hepatitis C Screening:    General: Risks and Benefits Discussed and Patient Declines    Other Counseling Topics:   Regular weightbearing exercise         Piedad Castillo, DO

## 2021-02-16 NOTE — PROGRESS NOTES
Assessment/Plan: patient use mometasone cream for dermatitis on in nasal bridge  Blood pressure stable at this time  Hypothyroidism stable  Patient have labs prior to next visit  Hyperlipidemia stable  Patient use vaporizer  Patient will add Claritin daily  Guidance given overall  Other refills will be given when needed  Follow-up in 4 months       Diagnoses and all orders for this visit:    Hypothyroidism, unspecified type  -     CBC and differential; Future  -     Comprehensive metabolic panel; Future  -     Lipid panel; Future  -     TSH, 3rd generation with Free T4 reflex; Future    GERD without esophagitis  -     CBC and differential; Future  -     Comprehensive metabolic panel; Future  -     Lipid panel; Future  -     TSH, 3rd generation with Free T4 reflex; Future    Essential hypertension  -     CBC and differential; Future  -     Comprehensive metabolic panel; Future  -     Lipid panel; Future  -     TSH, 3rd generation with Free T4 reflex; Future    Mixed hyperlipidemia  -     CBC and differential; Future  -     Comprehensive metabolic panel; Future  -     Lipid panel; Future  -     TSH, 3rd generation with Free T4 reflex; Future    Medicare annual wellness visit, subsequent    Dermatitis  -     mometasone (ELOCON) 0 1 % cream; Apply topically daily  -     CBC and differential; Future  -     Comprehensive metabolic panel; Future  -     Lipid panel; Future  -     TSH, 3rd generation with Free T4 reflex; Future    Atherosclerosis of native artery of both lower extremities with intermittent claudication (HCC)  -     CBC and differential; Future  -     Comprehensive metabolic panel; Future  -     Lipid panel; Future  -     TSH, 3rd generation with Free T4 reflex; Future            Subjective:        Patient ID: Radhames Saez is a 80 y o  female  Patient follow-up on hypothyroidism GERD hypertension hyperlipidemia  Patient with dry throat  No significant sore throat  No fevers or chills    No chest pain shortness of breath problems urinating or defecating  No weakness or numbness of extremities or lower extremities  Patient does walk with walker  Patient with dry heat in assisted living  Patient using a humidifier  The patient with rash on nose        The following portions of the patient's history were reviewed and updated as appropriate: allergies, current medications, past family history, past medical history, past social history, past surgical history and problem list       Review of Systems   Constitutional: Negative  HENT: Positive for sore throat  Eyes: Negative  Respiratory: Negative  Cardiovascular: Negative  Gastrointestinal: Negative  Endocrine: Negative  Genitourinary: Negative  Musculoskeletal: Negative  Skin: Positive for rash  Allergic/Immunologic: Negative  Neurological: Negative  Hematological: Negative  Psychiatric/Behavioral: Negative  Objective:      BMI Counseling: Body mass index is 27 43 kg/m²  The BMI is above normal  Nutrition recommendations include decreasing portion sizes  Exercise recommendations include exercising 3-5 times per week  /60 (BP Location: Right arm, Patient Position: Sitting, Cuff Size: Adult)   Temp (!) 97 4 °F (36 3 °C) (Tympanic)   Ht 4' 9 5" (1 461 m)   Wt 58 5 kg (129 lb)   BMI 27 43 kg/m²          Physical Exam  Vitals signs and nursing note reviewed  Constitutional:       General: She is not in acute distress  Appearance: Normal appearance  She is not ill-appearing, toxic-appearing or diaphoretic  HENT:      Head: Normocephalic and atraumatic  Right Ear: Tympanic membrane, ear canal and external ear normal  There is no impacted cerumen  Left Ear: Tympanic membrane, ear canal and external ear normal  There is no impacted cerumen  Nose: Nose normal  No congestion or rhinorrhea        Mouth/Throat:      Mouth: Mucous membranes are moist       Pharynx: No oropharyngeal exudate or posterior oropharyngeal erythema  Eyes:      General: No scleral icterus  Right eye: No discharge  Left eye: No discharge  Extraocular Movements: Extraocular movements intact  Conjunctiva/sclera: Conjunctivae normal       Pupils: Pupils are equal, round, and reactive to light  Neck:      Musculoskeletal: Normal range of motion and neck supple  No neck rigidity or muscular tenderness  Vascular: No carotid bruit  Cardiovascular:      Rate and Rhythm: Normal rate and regular rhythm  Pulses: Normal pulses  Heart sounds: Normal heart sounds  No murmur  No friction rub  No gallop  Pulmonary:      Effort: Pulmonary effort is normal  No respiratory distress  Breath sounds: Normal breath sounds  No stridor  No wheezing, rhonchi or rales  Chest:      Chest wall: No tenderness  Abdominal:      General: Abdomen is flat  Bowel sounds are normal  There is no distension  Palpations: Abdomen is soft  Tenderness: There is no abdominal tenderness  There is no guarding or rebound  Musculoskeletal: Normal range of motion  General: No swelling, tenderness, deformity or signs of injury  Right lower leg: Edema present  Left lower leg: Edema present  Lymphadenopathy:      Cervical: No cervical adenopathy  Skin:     General: Skin is warm and dry  Capillary Refill: Capillary refill takes less than 2 seconds  Coloration: Skin is not jaundiced  Findings: Rash present  No bruising, erythema or lesion  Comments: Mild erythematous spot on left nasal bridge   Neurological:      General: No focal deficit present  Mental Status: She is alert and oriented to person, place, and time  Cranial Nerves: No cranial nerve deficit  Sensory: No sensory deficit  Motor: No weakness        Coordination: Coordination normal       Gait: Gait normal    Psychiatric:         Mood and Affect: Mood normal  Behavior: Behavior normal          Thought Content:  Thought content normal          Judgment: Judgment normal

## 2021-02-16 NOTE — PATIENT INSTRUCTIONS
Medicare Preventive Visit Patient Instructions  Thank you for completing your Welcome to Medicare Visit or Medicare Annual Wellness Visit today  Your next wellness visit will be due in one year (2/16/2022)  The screening/preventive services that you may require over the next 5-10 years are detailed below  Some tests may not apply to you based off risk factors and/or age  Screening tests ordered at today's visit but not completed yet may show as past due  Also, please note that scanned in results may not display below  Preventive Screenings:  Service Recommendations Previous Testing/Comments   Colorectal Cancer Screening  * Colonoscopy    * Fecal Occult Blood Test (FOBT)/Fecal Immunochemical Test (FIT)  * Fecal DNA/Cologuard Test  * Flexible Sigmoidoscopy Age: 54-65 years old   Colonoscopy: every 10 years (may be performed more frequently if at higher risk)  OR  FOBT/FIT: every 1 year  OR  Cologuard: every 3 years  OR  Sigmoidoscopy: every 5 years  Screening may be recommended earlier than age 48 if at higher risk for colorectal cancer  Also, an individualized decision between you and your healthcare provider will decide whether screening between the ages of 74-80 would be appropriate  Colonoscopy: Not on file  FOBT/FIT: 07/31/2018  Cologuard: Not on file  Sigmoidoscopy: Not on file    Screening Not Indicated     Breast Cancer Screening Age: 36 years old  Frequency: every 1-2 years  Not required if history of left and right mastectomy Mammogram: 09/30/2015       Cervical Cancer Screening Between the ages of 21-29, pap smear recommended once every 3 years  Between the ages of 33-67, can perform pap smear with HPV co-testing every 5 years     Recommendations may differ for women with a history of total hysterectomy, cervical cancer, or abnormal pap smears in past  Pap Smear: Not on file    Screening Not Indicated   Hepatitis C Screening Once for adults born between 1945 and 1965  More frequently in patients at high risk for Hepatitis C Hep C Antibody: Not on file       Diabetes Screening 1-2 times per year if you're at risk for diabetes or have pre-diabetes Fasting glucose: 88 mg/dL   A1C: No results in last 5 years    Screening Current   Cholesterol Screening Once every 5 years if you don't have a lipid disorder  May order more often based on risk factors  Lipid panel: 03/02/2020    Screening Not Indicated  History Lipid Disorder     Other Preventive Screenings Covered by Medicare:  1  Abdominal Aortic Aneurysm (AAA) Screening: covered once if your at risk  You're considered to be at risk if you have a family history of AAA  2  Lung Cancer Screening: covers low dose CT scan once per year if you meet all of the following conditions: (1) Age 50-69; (2) No signs or symptoms of lung cancer; (3) Current smoker or have quit smoking within the last 15 years; (4) You have a tobacco smoking history of at least 30 pack years (packs per day multiplied by number of years you smoked); (5) You get a written order from a healthcare provider  3  Glaucoma Screening: covered annually if you're considered high risk: (1) You have diabetes OR (2) Family history of glaucoma OR (3)  aged 48 and older OR (3)  American aged 72 and older  3  Osteoporosis Screening: covered every 2 years if you meet one of the following conditions: (1) You're estrogen deficient and at risk for osteoporosis based off medical history and other findings; (2) Have a vertebral abnormality; (3) On glucocorticoid therapy for more than 3 months; (4) Have primary hyperparathyroidism; (5) On osteoporosis medications and need to assess response to drug therapy  · Last bone density test (DXA Scan): 06/10/2019   5  HIV Screening: covered annually if you're between the age of 15-65  Also covered annually if you are younger than 13 and older than 72 with risk factors for HIV infection   For pregnant patients, it is covered up to 3 times per pregnancy  Immunizations:  Immunization Recommendations   Influenza Vaccine Annual influenza vaccination during flu season is recommended for all persons aged >= 6 months who do not have contraindications   Pneumococcal Vaccine (Prevnar and Pneumovax)  * Prevnar = PCV13  * Pneumovax = PPSV23   Adults 25-60 years old: 1-3 doses may be recommended based on certain risk factors  Adults 72 years old: Prevnar (PCV13) vaccine recommended followed by Pneumovax (PPSV23) vaccine  If already received PPSV23 since turning 65, then PCV13 recommended at least one year after PPSV23 dose  Hepatitis B Vaccine 3 dose series if at intermediate or high risk (ex: diabetes, end stage renal disease, liver disease)   Tetanus (Td) Vaccine - COST NOT COVERED BY MEDICARE PART B Following completion of primary series, a booster dose should be given every 10 years to maintain immunity against tetanus  Td may also be given as tetanus wound prophylaxis  Tdap Vaccine - COST NOT COVERED BY MEDICARE PART B Recommended at least once for all adults  For pregnant patients, recommended with each pregnancy  Shingles Vaccine (Shingrix) - COST NOT COVERED BY MEDICARE PART B  2 shot series recommended in those aged 48 and above     Health Maintenance Due:  There are no preventive care reminders to display for this patient  Immunizations Due:      Topic Date Due    Influenza Vaccine (1) 09/01/2020     Advance Directives   What are advance directives? Advance directives are legal documents that state your wishes and plans for medical care  These plans are made ahead of time in case you lose your ability to make decisions for yourself  Advance directives can apply to any medical decision, such as the treatments you want, and if you want to donate organs  What are the types of advance directives? There are many types of advance directives, and each state has rules about how to use them   You may choose a combination of any of the following:  · Living will: This is a written record of the treatment you want  You can also choose which treatments you do not want, which to limit, and which to stop at a certain time  This includes surgery, medicine, IV fluid, and tube feedings  · Durable power of  for healthcare [facility]): This is a written record that states who you want to make healthcare choices for you when you are unable to make them for yourself  This person, called a proxy, is usually a family member or a friend  You may choose more than 1 proxy  · Do not resuscitate (DNR) order:  A DNR order is used in case your heart stops beating or you stop breathing  It is a request not to have certain forms of treatment, such as CPR  A DNR order may be included in other types of advance directives  · Medical directive: This covers the care that you want if you are in a coma, near death, or unable to make decisions for yourself  You can list the treatments you want for each condition  Treatment may include pain medicine, surgery, blood transfusions, dialysis, IV or tube feedings, and a ventilator (breathing machine)  · Values history: This document has questions about your views, beliefs, and how you feel and think about life  This information can help others choose the care that you would choose  Why are advance directives important? An advance directive helps you control your care  Although spoken wishes may be used, it is better to have your wishes written down  Spoken wishes can be misunderstood, or not followed  Treatments may be given even if you do not want them  An advance directive may make it easier for your family to make difficult choices about your care  Fall Prevention    Fall prevention  includes ways to make your home and other areas safer  It also includes ways you can move more carefully to prevent a fall   Health conditions that cause changes in your blood pressure, vision, or muscle strength and coordination may increase your risk for falls  Medicines may also increase your risk for falls if they make you dizzy, weak, or sleepy  Fall prevention tips:   · Stand or sit up slowly  · Use assistive devices as directed  · Wear shoes that fit well and have soles that   · Wear a personal alarm  · Stay active  · Manage your medical conditions  Home Safety Tips:  · Add items to prevent falls in the bathroom  · Keep paths clear  · Install bright lights in your home  · Keep items you use often on shelves within reach  · Paint or place reflective tape on the edges of your stairs  Urinary Incontinence   Urinary incontinence (UI)  is when you lose control of your bladder  UI develops because your bladder cannot store or empty urine properly  The 3 most common types of UI are stress incontinence, urge incontinence, or both  Medicines:   · May be given to help strengthen your bladder control  Report any side effects of medication to your healthcare provider  Do pelvic muscle exercises often:  Your pelvic muscles help you stop urinating  Squeeze these muscles tight for 5 seconds, then relax for 5 seconds  Gradually work up to squeezing for 10 seconds  Do 3 sets of 15 repetitions a day, or as directed  This will help strengthen your pelvic muscles and improve bladder control  Train your bladder:  Go to the bathroom at set times, such as every 2 hours, even if you do not feel the urge to go  You can also try to hold your urine when you feel the urge to go  For example, hold your urine for 5 minutes when you feel the urge to go  As that becomes easier, hold your urine for 10 minutes  Self-care:   · Keep a UI record  Write down how often you leak urine and how much you leak  Make a note of what you were doing when you leaked urine  · Drink liquids as directed  You may need to limit the amount of liquid you drink to help control your urine leakage  Do not drink any liquid right before you go to bed  Limit or do not have drinks that contain caffeine or alcohol  · Prevent constipation  Eat a variety of high-fiber foods  Good examples are high-fiber cereals, beans, vegetables, and whole-grain breads  Walking is the best way to trigger your intestines to have a bowel movement  · Exercise regularly and maintain a healthy weight  Weight loss and exercise will decrease pressure on your bladder and help you control your leakage  · Use a catheter as directed  to help empty your bladder  A catheter is a tiny, plastic tube that is put into your bladder to drain your urine  · Go to behavior therapy as directed  Behavior therapy may be used to help you learn to control your urge to urinate  Weight Management   Why it is important to manage your weight:  Being overweight increases your risk of health conditions such as heart disease, high blood pressure, type 2 diabetes, and certain types of cancer  It can also increase your risk for osteoarthritis, sleep apnea, and other respiratory problems  Aim for a slow, steady weight loss  Even a small amount of weight loss can lower your risk of health problems  How to lose weight safely:  A safe and healthy way to lose weight is to eat fewer calories and get regular exercise  You can lose up about 1 pound a week by decreasing the number of calories you eat by 500 calories each day  Healthy meal plan for weight management:  A healthy meal plan includes a variety of foods, contains fewer calories, and helps you stay healthy  A healthy meal plan includes the following:  · Eat whole-grain foods more often  A healthy meal plan should contain fiber  Fiber is the part of grains, fruits, and vegetables that is not broken down by your body  Whole-grain foods are healthy and provide extra fiber in your diet  Some examples of whole-grain foods are whole-wheat breads and pastas, oatmeal, brown rice, and bulgur  · Eat a variety of vegetables every day    Include dark, leafy greens such as spinach, kale, dilcia greens, and mustard greens  Eat yellow and orange vegetables such as carrots, sweet potatoes, and winter squash  · Eat a variety of fruits every day  Choose fresh or canned fruit (canned in its own juice or light syrup) instead of juice  Fruit juice has very little or no fiber  · Eat low-fat dairy foods  Drink fat-free (skim) milk or 1% milk  Eat fat-free yogurt and low-fat cottage cheese  Try low-fat cheeses such as mozzarella and other reduced-fat cheeses  · Choose meat and other protein foods that are low in fat  Choose beans or other legumes such as split peas or lentils  Choose fish, skinless poultry (chicken or turkey), or lean cuts of red meat (beef or pork)  Before you cook meat or poultry, cut off any visible fat  · Use less fat and oil  Try baking foods instead of frying them  Add less fat, such as margarine, sour cream, regular salad dressing and mayonnaise to foods  Eat fewer high-fat foods  Some examples of high-fat foods include french fries, doughnuts, ice cream, and cakes  · Eat fewer sweets  Limit foods and drinks that are high in sugar  This includes candy, cookies, regular soda, and sweetened drinks  Exercise:  Exercise at least 30 minutes per day on most days of the week  Some examples of exercise include walking, biking, dancing, and swimming  You can also fit in more physical activity by taking the stairs instead of the elevator or parking farther away from stores  Ask your healthcare provider about the best exercise plan for you  © Copyright [employer] 2018 Information is for End User's use only and may not be sold, redistributed or otherwise used for commercial purposes   All illustrations and images included in CareNotes® are the copyrighted property of A [employer] M [employer]  or [address] [place] [id number] [employer]

## 2021-03-29 DIAGNOSIS — J30.1 ALLERGIC RHINITIS DUE TO POLLEN, UNSPECIFIED SEASONALITY: Primary | ICD-10-CM

## 2021-03-30 RX ORDER — FLUTICASONE PROPIONATE 50 MCG
SPRAY, SUSPENSION (ML) NASAL
Qty: 16 G | Refills: 10 | Status: SHIPPED | OUTPATIENT
Start: 2021-03-30 | End: 2021-06-07

## 2021-06-07 DIAGNOSIS — J30.1 ALLERGIC RHINITIS DUE TO POLLEN, UNSPECIFIED SEASONALITY: ICD-10-CM

## 2021-06-07 RX ORDER — FLUTICASONE PROPIONATE 50 MCG
SPRAY, SUSPENSION (ML) NASAL
Qty: 16 G | Refills: 10 | Status: SHIPPED | OUTPATIENT
Start: 2021-06-07

## 2021-06-18 ENCOUNTER — OFFICE VISIT (OUTPATIENT)
Dept: FAMILY MEDICINE CLINIC | Facility: CLINIC | Age: 86
End: 2021-06-18
Payer: MEDICARE

## 2021-06-18 VITALS
SYSTOLIC BLOOD PRESSURE: 138 MMHG | BODY MASS INDEX: 27.08 KG/M2 | HEIGHT: 58 IN | DIASTOLIC BLOOD PRESSURE: 60 MMHG | WEIGHT: 129 LBS | TEMPERATURE: 96.5 F

## 2021-06-18 DIAGNOSIS — R60.0 LOWER EXTREMITY EDEMA: ICD-10-CM

## 2021-06-18 DIAGNOSIS — K21.9 GERD WITHOUT ESOPHAGITIS: ICD-10-CM

## 2021-06-18 DIAGNOSIS — E78.2 MIXED HYPERLIPIDEMIA: ICD-10-CM

## 2021-06-18 DIAGNOSIS — M81.0 AGE-RELATED OSTEOPOROSIS WITHOUT CURRENT PATHOLOGICAL FRACTURE: ICD-10-CM

## 2021-06-18 DIAGNOSIS — N39.41 URGE INCONTINENCE OF URINE: Primary | ICD-10-CM

## 2021-06-18 DIAGNOSIS — E03.9 HYPOTHYROIDISM, UNSPECIFIED TYPE: ICD-10-CM

## 2021-06-18 DIAGNOSIS — I10 ESSENTIAL HYPERTENSION: ICD-10-CM

## 2021-06-18 DIAGNOSIS — I70.213 ATHEROSCLEROSIS OF NATIVE ARTERY OF BOTH LOWER EXTREMITIES WITH INTERMITTENT CLAUDICATION (HCC): ICD-10-CM

## 2021-06-18 PROCEDURE — 99214 OFFICE O/P EST MOD 30 MIN: CPT | Performed by: FAMILY MEDICINE

## 2021-06-18 RX ORDER — MIRABEGRON 25 MG/1
25 TABLET, FILM COATED, EXTENDED RELEASE ORAL DAILY
COMMUNITY
Start: 2021-06-17 | End: 2022-05-18

## 2021-06-18 NOTE — PROGRESS NOTES
Assessment/Plan:  Patient will follow with   Urology regarding urinary incontinence  Patient will start Myrbetriq as directed  Patient continue with the conditions  Refills will be given when needed  Patient have laboratory studies done  Patient will follow-up in 3-4 months  Patient will try to elevate legs  Patient follow-up with ENT appropriately  Patient modify diet appropriately  Diagnoses and all orders for this visit:    Urge incontinence of urine  -     CBC and differential; Future  -     Comprehensive metabolic panel; Future  -     Lipid panel; Future  -     TSH, 3rd generation with Free T4 reflex; Future  -     NT-BNP PRO; Future    Hypothyroidism, unspecified type  -     CBC and differential; Future  -     Comprehensive metabolic panel; Future  -     Lipid panel; Future  -     TSH, 3rd generation with Free T4 reflex; Future  -     NT-BNP PRO; Future    GERD without esophagitis    Essential hypertension  -     CBC and differential; Future  -     Comprehensive metabolic panel; Future  -     Lipid panel; Future  -     TSH, 3rd generation with Free T4 reflex; Future  -     NT-BNP PRO; Future    Atherosclerosis of native artery of both lower extremities with intermittent claudication (HCC)  -     CBC and differential; Future  -     Comprehensive metabolic panel; Future  -     Lipid panel; Future  -     TSH, 3rd generation with Free T4 reflex; Future  -     NT-BNP PRO; Future    Age-related osteoporosis without current pathological fracture    Mixed hyperlipidemia  -     CBC and differential; Future  -     Comprehensive metabolic panel; Future  -     Lipid panel; Future  -     TSH, 3rd generation with Free T4 reflex; Future  -     NT-BNP PRO; Future    Lower extremity edema  -     CBC and differential; Future  -     Comprehensive metabolic panel; Future  -     Lipid panel; Future  -     TSH, 3rd generation with Free T4 reflex; Future  -     NT-BNP PRO;  Future    Other orders  -     Mirabegron ER (Myrbetriq) 25 MG TB24; Take 25 mg by mouth daily            Subjective:        Patient ID: Tapan Roque is a 80 y o  female  Patient is here to follow-up on hypertension hyperlipidemia osteoporosis as well as GERD and hypothyroidism  Patient also with urge incontinence  Patient with history of peripheral vascular disease also  Patient has some swelling bilateral lower extremities  Patient still with some hoarseness  Patient did see 2 ENTs and had nasolaryngoscopy done  Patient does walk with walker  No chest pain or shortness of breath at rest   Patient does have some shortness of breath with exertion  Patient does have some large stools  The patient with urinary incontinence  The patient did see urology  The following portions of the patient's history were reviewed and updated as appropriate: allergies, current medications, past family history, past medical history, past social history, past surgical history and problem list       Review of Systems   Constitutional: Negative  HENT: Negative  Eyes: Negative  Respiratory: Negative  Cardiovascular: Positive for leg swelling  Gastrointestinal: Negative  Endocrine: Negative  Genitourinary: Negative  Musculoskeletal: Negative  Skin: Negative  Allergic/Immunologic: Negative  Neurological: Negative  Hematological: Negative  Psychiatric/Behavioral: Negative  Objective:      BMI Counseling: Body mass index is 27 43 kg/m²  The BMI is above normal  Nutrition recommendations include decreasing portion sizes  Exercise recommendations include moderate physical activity 150 minutes/week  Depression Screening and Follow-up Plan: Clincally patient does not have depression  No treatment is required               /60 (BP Location: Right arm, Patient Position: Sitting, Cuff Size: Adult)   Temp (!) 96 5 °F (35 8 °C) (Tympanic)   Ht 4' 9 5" (1 461 m)   Wt 58 5 kg (129 lb)   BMI 27 43 kg/m² Physical Exam  Vitals and nursing note reviewed  Constitutional:       General: She is not in acute distress  Appearance: Normal appearance  She is not ill-appearing, toxic-appearing or diaphoretic  HENT:      Head: Normocephalic and atraumatic  Right Ear: Tympanic membrane, ear canal and external ear normal  There is no impacted cerumen  Left Ear: Tympanic membrane, ear canal and external ear normal  There is no impacted cerumen  Nose: Nose normal  No congestion or rhinorrhea  Mouth/Throat:      Mouth: Mucous membranes are moist       Pharynx: No oropharyngeal exudate or posterior oropharyngeal erythema  Eyes:      General: No scleral icterus  Right eye: No discharge  Left eye: No discharge  Extraocular Movements: Extraocular movements intact  Conjunctiva/sclera: Conjunctivae normal       Pupils: Pupils are equal, round, and reactive to light  Neck:      Vascular: No carotid bruit  Cardiovascular:      Rate and Rhythm: Normal rate and regular rhythm  Pulses: Normal pulses  Heart sounds: Normal heart sounds  No murmur heard  No friction rub  No gallop  Pulmonary:      Effort: Pulmonary effort is normal  No respiratory distress  Breath sounds: No stridor  Rales present  No wheezing or rhonchi  Chest:      Chest wall: No tenderness  Abdominal:      General: Abdomen is flat  Bowel sounds are normal  There is no distension  Palpations: Abdomen is soft  Tenderness: There is no abdominal tenderness  There is no guarding or rebound  Musculoskeletal:         General: No swelling, tenderness, deformity or signs of injury  Normal range of motion  Cervical back: Normal range of motion and neck supple  No rigidity  No muscular tenderness  Right lower leg: No edema  Left lower leg: No edema  Lymphadenopathy:      Cervical: No cervical adenopathy  Skin:     General: Skin is warm and dry        Capillary Refill: Capillary refill takes less than 2 seconds  Coloration: Skin is not jaundiced  Findings: No bruising, erythema, lesion or rash  Neurological:      Mental Status: She is alert and oriented to person, place, and time  Mental status is at baseline  Cranial Nerves: No cranial nerve deficit  Sensory: No sensory deficit  Motor: No weakness  Coordination: Coordination normal       Gait: Gait normal    Psychiatric:         Mood and Affect: Mood normal          Behavior: Behavior normal          Thought Content:  Thought content normal          Judgment: Judgment normal

## 2021-06-25 ENCOUNTER — TELEPHONE (OUTPATIENT)
Dept: FAMILY MEDICINE CLINIC | Facility: CLINIC | Age: 86
End: 2021-06-25

## 2021-06-25 DIAGNOSIS — I50.9 HEART FAILURE, UNSPECIFIED HF CHRONICITY, UNSPECIFIED HEART FAILURE TYPE (HCC): Primary | ICD-10-CM

## 2021-06-25 RX ORDER — TORSEMIDE 10 MG/1
10 TABLET ORAL DAILY
Qty: 30 TABLET | Refills: 2 | Status: SHIPPED | OUTPATIENT
Start: 2021-06-25 | End: 2021-09-22

## 2021-06-25 NOTE — TELEPHONE ENCOUNTER
----- Message from Baylee Douglsa DO sent at 6/23/2021 12:27 PM EDT -----   Call patient  Labs reviewed  Patient's BNP is elevated consistent with some heart failure  Suggest start torsemide 10 mg daily as needed for edema Number 30 with 2 refills    Suggest referral to Cardiology

## 2021-07-21 ENCOUNTER — OFFICE VISIT (OUTPATIENT)
Dept: FAMILY MEDICINE CLINIC | Facility: CLINIC | Age: 86
End: 2021-07-21
Payer: MEDICARE

## 2021-07-21 VITALS
WEIGHT: 128 LBS | HEIGHT: 58 IN | TEMPERATURE: 96.7 F | SYSTOLIC BLOOD PRESSURE: 142 MMHG | BODY MASS INDEX: 26.87 KG/M2 | DIASTOLIC BLOOD PRESSURE: 64 MMHG

## 2021-07-21 DIAGNOSIS — E03.9 HYPOTHYROIDISM, UNSPECIFIED TYPE: ICD-10-CM

## 2021-07-21 DIAGNOSIS — J33.9 NASAL POLYPOSIS: ICD-10-CM

## 2021-07-21 DIAGNOSIS — E78.2 MIXED HYPERLIPIDEMIA: ICD-10-CM

## 2021-07-21 DIAGNOSIS — I10 ESSENTIAL HYPERTENSION: ICD-10-CM

## 2021-07-21 DIAGNOSIS — Z01.818 PREOP EXAMINATION: Primary | ICD-10-CM

## 2021-07-21 PROCEDURE — 99214 OFFICE O/P EST MOD 30 MIN: CPT | Performed by: FAMILY MEDICINE

## 2021-07-21 NOTE — PROGRESS NOTES
Assessment/Plan:  Patient have cardiac clearance per Cardiology  Patient going for echocardiogram in the near future  If this is okay patient will be   Low /moderate risk for cardiopulmonary event and okay to proceed with surgery  Patient already stopped aspirin prior to surgery  Patient will not take torsemide or lisinopril the day of surgery  Patient will stop fish oil  Diagnoses and all orders for this visit:    Preop examination    Nasal polyposis    Hypothyroidism, unspecified type    Essential hypertension    Mixed hyperlipidemia            Subjective:        Patient ID: Danita Granados is a 80 y o  female  Patient is here for preop clearance requested by Dr Floridalma Polk ENT for  Polyps to be removed bilaterally on August 2nd  No chest pain shortness of breath or syncope  No fevers or chills or URI symptoms  Patient have echocardiogram on the 26th  No bleeding issues at the present time  Patient getting cardiac clearance per Cardiology  The following portions of the patient's history were reviewed and updated as appropriate: allergies, current medications, past family history, past medical history, past social history, past surgical history and problem list       Review of Systems   Constitutional: Negative  HENT: Negative  Eyes: Negative  Respiratory: Negative  Cardiovascular: Negative  Gastrointestinal: Negative  Endocrine: Negative  Genitourinary: Negative  Musculoskeletal: Positive for neck pain  Skin: Negative  Allergic/Immunologic: Negative  Neurological: Negative  Hematological: Negative  Psychiatric/Behavioral: Negative  Objective: Falls Plan of Care: balance, strength, and gait training instructions were provided               /64 (BP Location: Right arm, Patient Position: Sitting, Cuff Size: Adult)   Temp (!) 96 7 °F (35 9 °C) (Tympanic)   Ht 4' 9 5" (1 461 m)   Wt 58 1 kg (128 lb)   BMI 27 22 kg/m²          Physical Exam  Vitals and nursing note reviewed  Constitutional:       General: She is not in acute distress  Appearance: Normal appearance  She is not ill-appearing, toxic-appearing or diaphoretic  HENT:      Head: Normocephalic and atraumatic  Right Ear: Tympanic membrane, ear canal and external ear normal  There is no impacted cerumen  Left Ear: Tympanic membrane, ear canal and external ear normal  There is no impacted cerumen  Nose: Nose normal  No congestion or rhinorrhea  Mouth/Throat:      Mouth: Mucous membranes are moist       Pharynx: No oropharyngeal exudate or posterior oropharyngeal erythema  Eyes:      General: No scleral icterus  Right eye: No discharge  Left eye: No discharge  Extraocular Movements: Extraocular movements intact  Conjunctiva/sclera: Conjunctivae normal       Pupils: Pupils are equal, round, and reactive to light  Neck:      Vascular: No carotid bruit  Cardiovascular:      Rate and Rhythm: Normal rate and regular rhythm  Pulses: Normal pulses  Heart sounds: Normal heart sounds  No murmur heard  No friction rub  No gallop  Pulmonary:      Effort: Pulmonary effort is normal  No respiratory distress  Breath sounds: Normal breath sounds  No stridor  No wheezing, rhonchi or rales  Chest:      Chest wall: No tenderness  Abdominal:      General: Abdomen is flat  Bowel sounds are normal  There is no distension  Palpations: Abdomen is soft  Tenderness: There is no abdominal tenderness  There is no guarding or rebound  Musculoskeletal:         General: No swelling, tenderness, deformity or signs of injury  Normal range of motion  Cervical back: Normal range of motion and neck supple  No rigidity  No muscular tenderness  Right lower leg: Edema present  Left lower leg: Edema present  Lymphadenopathy:      Cervical: No cervical adenopathy  Skin:     General: Skin is warm and dry        Capillary Refill: Capillary refill takes less than 2 seconds  Coloration: Skin is not jaundiced  Findings: No bruising, erythema, lesion or rash  Neurological:      Mental Status: She is alert and oriented to person, place, and time  Mental status is at baseline  Cranial Nerves: No cranial nerve deficit  Sensory: No sensory deficit  Motor: No weakness  Coordination: Coordination normal       Gait: Gait normal    Psychiatric:         Mood and Affect: Mood normal          Behavior: Behavior normal          Thought Content:  Thought content normal          Judgment: Judgment normal

## 2021-07-22 ENCOUNTER — TELEPHONE (OUTPATIENT)
Dept: FAMILY MEDICINE CLINIC | Facility: CLINIC | Age: 86
End: 2021-07-22

## 2021-09-22 DIAGNOSIS — I50.9 HEART FAILURE, UNSPECIFIED HF CHRONICITY, UNSPECIFIED HEART FAILURE TYPE (HCC): ICD-10-CM

## 2021-09-22 RX ORDER — TORSEMIDE 10 MG/1
10 TABLET ORAL DAILY
Qty: 30 TABLET | Refills: 10 | Status: SHIPPED | OUTPATIENT
Start: 2021-09-22

## 2021-09-24 ENCOUNTER — TELEPHONE (OUTPATIENT)
Dept: FAMILY MEDICINE CLINIC | Facility: CLINIC | Age: 86
End: 2021-09-24

## 2021-09-24 NOTE — TELEPHONE ENCOUNTER
Sam Brice[facility] sent a fax to discontinue ondansetron 4mg tablet because patient has not taken them in over a year  Please review

## 2021-10-22 DIAGNOSIS — Z11.1 TUBERCULOSIS SCREENING: Primary | ICD-10-CM

## 2021-11-01 ENCOUNTER — TELEMEDICINE (OUTPATIENT)
Dept: FAMILY MEDICINE CLINIC | Facility: CLINIC | Age: 86
End: 2021-11-01
Payer: MEDICARE

## 2021-11-01 DIAGNOSIS — U07.1 COVID-19: Primary | ICD-10-CM

## 2021-11-01 PROCEDURE — 99443 PR PHYS/QHP TELEPHONE EVALUATION 21-30 MIN: CPT | Performed by: FAMILY MEDICINE

## 2022-01-10 ENCOUNTER — OFFICE VISIT (OUTPATIENT)
Dept: FAMILY MEDICINE CLINIC | Facility: CLINIC | Age: 87
End: 2022-01-10
Payer: MEDICARE

## 2022-01-10 VITALS
TEMPERATURE: 96.4 F | HEIGHT: 58 IN | BODY MASS INDEX: 27.12 KG/M2 | SYSTOLIC BLOOD PRESSURE: 152 MMHG | WEIGHT: 129.2 LBS | DIASTOLIC BLOOD PRESSURE: 70 MMHG

## 2022-01-10 DIAGNOSIS — I50.32 CHRONIC DIASTOLIC CHF (CONGESTIVE HEART FAILURE) (HCC): ICD-10-CM

## 2022-01-10 DIAGNOSIS — F11.20 CONTINUOUS OPIOID DEPENDENCE (HCC): ICD-10-CM

## 2022-01-10 DIAGNOSIS — M48.061 SPINAL STENOSIS OF LUMBAR REGION, UNSPECIFIED WHETHER NEUROGENIC CLAUDICATION PRESENT: ICD-10-CM

## 2022-01-10 DIAGNOSIS — I70.213 ATHEROSCLEROSIS OF NATIVE ARTERY OF BOTH LOWER EXTREMITIES WITH INTERMITTENT CLAUDICATION (HCC): ICD-10-CM

## 2022-01-10 DIAGNOSIS — R22.0 RIGHT FACIAL SWELLING: Primary | ICD-10-CM

## 2022-01-10 PROCEDURE — 99214 OFFICE O/P EST MOD 30 MIN: CPT | Performed by: FAMILY MEDICINE

## 2022-01-10 RX ORDER — GABAPENTIN 100 MG/1
100 CAPSULE ORAL
Qty: 90 CAPSULE | Refills: 1 | Status: SHIPPED | OUTPATIENT
Start: 2022-01-10 | End: 2022-01-17

## 2022-01-10 NOTE — PROGRESS NOTES
Assessment/Plan:  Guidance given overall  Patient will observe right facial swelling which is resolved at this time  Possibly allergic reaction verses parotid gland swelling verses sinus related issue  Patient will call us if not seeing improvement  Patient use saline nasal sprays as needed  Patient will be start gabapentin 100 mg nightly for burning sensation bilateral lower extremities probably consistent with lumbar stenosis which she has  Patient does not wish any further x-ray or imaging studies done at the present time  Patient will follow-up in 3 months or as needed  Diagnoses and all orders for this visit:    Right facial swelling    Spinal stenosis of lumbar region, unspecified whether neurogenic claudication present  -     gabapentin (Neurontin) 100 mg capsule; Take 1 capsule (100 mg total) by mouth daily at bedtime    Chronic diastolic CHF (congestive heart failure) (HCC)    Continuous opioid dependence (HCC)    Atherosclerosis of native artery of both lower extremities with intermittent claudication (HCC)            Subjective:        Patient ID: Wicho Dorantes is a 80 y o  female  Patient is here with burning sensation bilateral legs were side greater than left  Patient also with cramping in bilateral legs worse at night  No significant discomfort with ambulation  No chest pain shortness of breath  Patient with some back pain lumbar region  Patient also status post swelling of the right face relieved by ice over the weekend  Note dental/maxillary or mandibular pain  No fevers or chills  No sore throat or rhinorrhea  Patient has had some dry nasal passages  No ear pain  No headaches or visual disturbance          The following portions of the patient's history were reviewed and updated as appropriate: allergies, current medications, past family history, past medical history, past social history, past surgical history and problem list       Review of Systems   HENT:        Facial swelling on the right   Musculoskeletal: Positive for arthralgias and back pain  Objective:      BMI Counseling: Body mass index is 27 47 kg/m²  The BMI is above normal  Nutrition recommendations include consuming healthier snacks  Exercise recommendations include exercising 3-5 times per week  Rationale for BMI follow-up plan is due to patient being overweight or obese  Depression Screening and Follow-up Plan: Clincally patient does not have depression  No treatment is required  /70 (BP Location: Right arm, Patient Position: Sitting, Cuff Size: Standard)   Temp (!) 96 4 °F (35 8 °C) (Tympanic)   Ht 4' 9 5" (1 461 m)   Wt 58 6 kg (129 lb 3 2 oz)   BMI 27 47 kg/m²          Physical Exam  Vitals and nursing note reviewed  Constitutional:       General: She is not in acute distress  Appearance: Normal appearance  She is not ill-appearing, toxic-appearing or diaphoretic  HENT:      Head: Normocephalic and atraumatic  Right Ear: Tympanic membrane, ear canal and external ear normal  There is no impacted cerumen  Left Ear: Tympanic membrane, ear canal and external ear normal  There is no impacted cerumen  Nose: Nose normal  No congestion or rhinorrhea  Mouth/Throat:      Mouth: Mucous membranes are moist       Pharynx: No oropharyngeal exudate or posterior oropharyngeal erythema  Eyes:      General: No scleral icterus  Right eye: No discharge  Left eye: No discharge  Extraocular Movements: Extraocular movements intact  Conjunctiva/sclera: Conjunctivae normal       Pupils: Pupils are equal, round, and reactive to light  Neck:      Vascular: No carotid bruit  Cardiovascular:      Rate and Rhythm: Normal rate and regular rhythm  Pulses: Normal pulses  Heart sounds: Normal heart sounds  No murmur heard  No friction rub  No gallop  Pulmonary:      Effort: Pulmonary effort is normal  No respiratory distress        Breath sounds: Normal breath sounds  No stridor  No wheezing, rhonchi or rales  Chest:      Chest wall: No tenderness  Musculoskeletal:         General: Tenderness present  No swelling, deformity or signs of injury  Cervical back: Normal range of motion and neck supple  No rigidity  No muscular tenderness  Right lower leg: No edema  Left lower leg: No edema  Comments: Lower lumbar pain bilaterally with palpation   Lymphadenopathy:      Cervical: No cervical adenopathy  Skin:     General: Skin is warm and dry  Capillary Refill: Capillary refill takes less than 2 seconds  Coloration: Skin is not jaundiced  Findings: No bruising, erythema, lesion or rash  Neurological:      Mental Status: She is alert and oriented to person, place, and time  Mental status is at baseline  Cranial Nerves: No cranial nerve deficit  Sensory: No sensory deficit  Motor: No weakness  Coordination: Coordination normal       Gait: Gait normal    Psychiatric:         Mood and Affect: Mood normal          Behavior: Behavior normal          Thought Content:  Thought content normal          Judgment: Judgment normal

## 2022-01-17 ENCOUNTER — TELEPHONE (OUTPATIENT)
Dept: FAMILY MEDICINE CLINIC | Facility: CLINIC | Age: 87
End: 2022-01-17

## 2022-01-17 DIAGNOSIS — M48.061 SPINAL STENOSIS OF LUMBAR REGION, UNSPECIFIED WHETHER NEUROGENIC CLAUDICATION PRESENT: ICD-10-CM

## 2022-01-17 DIAGNOSIS — M48.061 SPINAL STENOSIS OF LUMBAR REGION, UNSPECIFIED WHETHER NEUROGENIC CLAUDICATION PRESENT: Primary | ICD-10-CM

## 2022-01-17 RX ORDER — GABAPENTIN 300 MG/1
300 CAPSULE ORAL 3 TIMES DAILY
COMMUNITY
End: 2022-01-17 | Stop reason: SDUPTHER

## 2022-01-17 RX ORDER — GABAPENTIN 300 MG/1
300 CAPSULE ORAL 3 TIMES DAILY
Qty: 90 CAPSULE | Refills: 1 | Status: SHIPPED | OUTPATIENT
Start: 2022-01-17 | End: 2022-01-17

## 2022-01-17 RX ORDER — GABAPENTIN 300 MG/1
CAPSULE ORAL
Qty: 90 CAPSULE | Refills: 1 | Status: SHIPPED | OUTPATIENT
Start: 2022-01-17

## 2022-01-17 NOTE — TELEPHONE ENCOUNTER
Pt would only like to take 300 mgs of Gabapentin at hs    Can we change the Rx to reflect that please

## 2022-01-17 NOTE — TELEPHONE ENCOUNTER
PATIENT CALLED SHE SAID THE PAIN MEDICATION YOU PUT HER ON AT LAST VISIT (GABAPENTIN) IN NOT WORKING SHE FEELS SHE NEEDS SOMETHING STRONGER

## 2022-02-23 ENCOUNTER — APPOINTMENT (EMERGENCY)
Dept: RADIOLOGY | Facility: HOSPITAL | Age: 87
DRG: 193 | End: 2022-02-23
Payer: MEDICARE

## 2022-02-23 ENCOUNTER — APPOINTMENT (OUTPATIENT)
Dept: NON INVASIVE DIAGNOSTICS | Facility: HOSPITAL | Age: 87
DRG: 193 | End: 2022-02-23
Payer: MEDICARE

## 2022-02-23 ENCOUNTER — HOSPITAL ENCOUNTER (INPATIENT)
Facility: HOSPITAL | Age: 87
LOS: 3 days | Discharge: HOME HEALTH | DRG: 193 | End: 2022-02-27
Attending: EMERGENCY MEDICINE | Admitting: INTERNAL MEDICINE
Payer: MEDICARE

## 2022-02-23 DIAGNOSIS — I48.91 NEW ONSET ATRIAL FIBRILLATION (HCC): ICD-10-CM

## 2022-02-23 DIAGNOSIS — R09.02 HYPOXIA: ICD-10-CM

## 2022-02-23 DIAGNOSIS — G56.02 CARPAL TUNNEL SYNDROME, LEFT: ICD-10-CM

## 2022-02-23 DIAGNOSIS — J10.1 INFLUENZA A: Primary | ICD-10-CM

## 2022-02-23 PROBLEM — J96.01 ACUTE RESPIRATORY FAILURE WITH HYPOXIA (HCC): Status: ACTIVE | Noted: 2022-02-23

## 2022-02-23 PROBLEM — R00.0 TACHYCARDIA: Status: ACTIVE | Noted: 2022-02-23

## 2022-02-23 LAB
ALBUMIN SERPL BCP-MCNC: 3.4 G/DL (ref 3.5–5)
ALP SERPL-CCNC: 99 U/L (ref 46–116)
ALT SERPL W P-5'-P-CCNC: 27 U/L (ref 12–78)
ANION GAP SERPL CALCULATED.3IONS-SCNC: 7 MMOL/L (ref 4–13)
AORTIC ROOT: 2.7 CM
APICAL FOUR CHAMBER EJECTION FRACTION: 75 %
APTT PPP: 29 SECONDS (ref 23–37)
ASCENDING AORTA: 2.3 CM (ref 1.79–2.68)
AST SERPL W P-5'-P-CCNC: 27 U/L (ref 5–45)
ATRIAL RATE: 78 BPM
BASOPHILS # BLD AUTO: 0.03 THOUSANDS/ΜL (ref 0–0.1)
BASOPHILS NFR BLD AUTO: 0 % (ref 0–1)
BILIRUB SERPL-MCNC: 0.53 MG/DL (ref 0.2–1)
BUN SERPL-MCNC: 22 MG/DL (ref 5–25)
CALCIUM ALBUM COR SERPL-MCNC: 10 MG/DL (ref 8.3–10.1)
CALCIUM SERPL-MCNC: 9.5 MG/DL (ref 8.3–10.1)
CHLORIDE SERPL-SCNC: 102 MMOL/L (ref 100–108)
CO2 SERPL-SCNC: 27 MMOL/L (ref 21–32)
CREAT SERPL-MCNC: 0.77 MG/DL (ref 0.6–1.3)
E WAVE DECELERATION TIME: 147 MS
EOSINOPHIL # BLD AUTO: 0.04 THOUSAND/ΜL (ref 0–0.61)
EOSINOPHIL NFR BLD AUTO: 0 % (ref 0–6)
ERYTHROCYTE [DISTWIDTH] IN BLOOD BY AUTOMATED COUNT: 14.6 % (ref 11.6–15.1)
FLUAV RNA RESP QL NAA+PROBE: POSITIVE
FLUBV RNA RESP QL NAA+PROBE: NEGATIVE
FRACTIONAL SHORTENING: 37 % (ref 28–44)
GFR SERPL CREATININE-BSD FRML MDRD: 67 ML/MIN/1.73SQ M
GLUCOSE SERPL-MCNC: 159 MG/DL (ref 65–140)
HCT VFR BLD AUTO: 35.5 % (ref 34.8–46.1)
HGB BLD-MCNC: 11.6 G/DL (ref 11.5–15.4)
IMM GRANULOCYTES # BLD AUTO: 0.03 THOUSAND/UL (ref 0–0.2)
IMM GRANULOCYTES NFR BLD AUTO: 0 % (ref 0–2)
INR PPP: 1.07 (ref 0.84–1.19)
INTERVENTRICULAR SEPTUM IN DIASTOLE (PARASTERNAL SHORT AXIS VIEW): 0.9 CM (ref 0.48–0.9)
INTERVENTRICULAR SEPTUM: 0.9 CM (ref 0.6–1.1)
LAAS-AP2: 18.3 CM2
LAAS-AP4: 13.8 CM2
LACTATE SERPL-SCNC: 1.6 MMOL/L (ref 0.5–2)
LEFT ATRIUM SIZE: 3.9 CM
LEFT INTERNAL DIMENSION IN SYSTOLE: 1.9 CM (ref 2.27–3.43)
LEFT VENTRICULAR INTERNAL DIMENSION IN DIASTOLE: 3 CM (ref 3.69–5.49)
LEFT VENTRICULAR POSTERIOR WALL IN END DIASTOLE: 1.3 CM (ref 0.47–0.89)
LEFT VENTRICULAR STROKE VOLUME: 22 ML
LVSV (TEICH): 22 ML
LYMPHOCYTES # BLD AUTO: 0.76 THOUSANDS/ΜL (ref 0.6–4.47)
LYMPHOCYTES NFR BLD AUTO: 7 % (ref 14–44)
MCH RBC QN AUTO: 28.3 PG (ref 26.8–34.3)
MCHC RBC AUTO-ENTMCNC: 32.7 G/DL (ref 31.4–37.4)
MCV RBC AUTO: 87 FL (ref 82–98)
MONOCYTES # BLD AUTO: 0.54 THOUSAND/ΜL (ref 0.17–1.22)
MONOCYTES NFR BLD AUTO: 5 % (ref 4–12)
MV E'TISSUE VEL-LAT: 8 CM/S
MV E'TISSUE VEL-SEP: 7 CM/S
MV PEAK A VEL: 0.41 M/S
MV PEAK E VEL: 88 CM/S
MV STENOSIS PRESSURE HALF TIME: 43 MS
MV VALVE AREA P 1/2 METHOD: 5.12 CM2
NEUTROPHILS # BLD AUTO: 8.83 THOUSANDS/ΜL (ref 1.85–7.62)
NEUTS SEG NFR BLD AUTO: 88 % (ref 43–75)
NRBC BLD AUTO-RTO: 0 /100 WBCS
P AXIS: 255 DEGREES
PLATELET # BLD AUTO: 224 THOUSANDS/UL (ref 149–390)
PMV BLD AUTO: 12.4 FL (ref 8.9–12.7)
POTASSIUM SERPL-SCNC: 4 MMOL/L (ref 3.5–5.3)
PROCALCITONIN SERPL-MCNC: 0.11 NG/ML
PROT SERPL-MCNC: 8 G/DL (ref 6.4–8.2)
PROTHROMBIN TIME: 13.5 SECONDS (ref 11.6–14.5)
QRS AXIS: 66 DEGREES
QRSD INTERVAL: 68 MS
QT INTERVAL: 308 MS
QTC INTERVAL: 440 MS
RA PRESSURE ESTIMATED: 3 MMHG
RBC # BLD AUTO: 4.1 MILLION/UL (ref 3.81–5.12)
RIGHT ATRIAL 2D VOLUME: 25 ML
RIGHT ATRIUM AREA SYSTOLE A4C: 12.6 CM2
RIGHT VENTRICLE ID DIMENSION: 3.3 CM
RSV RNA RESP QL NAA+PROBE: NEGATIVE
RV PSP: 35 MMHG
SARS-COV-2 RNA RESP QL NAA+PROBE: NEGATIVE
SL CV LEFT ATRIUM LENGTH A2C: 5.4 CM
SL CV LV EF: 70
SL CV PED ECHO LEFT VENTRICLE DIASTOLIC VOLUME (MOD BIPLANE) 2D: 34 ML
SL CV PED ECHO LEFT VENTRICLE SYSTOLIC VOLUME (MOD BIPLANE) 2D: 11 ML
SODIUM SERPL-SCNC: 136 MMOL/L (ref 136–145)
T WAVE AXIS: -14 DEGREES
TR MAX PG: 32 MMHG
TR PEAK VELOCITY: 4.3 M/S
TRICUSPID VALVE PEAK REGURGITATION VELOCITY: 4.28 M/S
TSH SERPL DL<=0.05 MIU/L-ACNC: 1.44 UIU/ML (ref 0.36–3.74)
VENTRICULAR RATE: 123 BPM
WBC # BLD AUTO: 10.23 THOUSAND/UL (ref 4.31–10.16)
Z-SCORE OF ASCENDING AORTA: 0.29
Z-SCORE OF INTERVENTRICULAR SEPTUM IN END DIASTOLE: 1.93
Z-SCORE OF LEFT VENTRICULAR DIMENSION IN END DIASTOLE: -4.05
Z-SCORE OF LEFT VENTRICULAR DIMENSION IN END SYSTOLE: -3.04
Z-SCORE OF LEFT VENTRICULAR POSTERIOR WALL IN END DIASTOLE: 5.76

## 2022-02-23 PROCEDURE — 0241U HB NFCT DS VIR RESP RNA 4 TRGT: CPT

## 2022-02-23 PROCEDURE — 99205 OFFICE O/P NEW HI 60 MIN: CPT | Performed by: INTERNAL MEDICINE

## 2022-02-23 PROCEDURE — 93306 TTE W/DOPPLER COMPLETE: CPT

## 2022-02-23 PROCEDURE — 83605 ASSAY OF LACTIC ACID: CPT

## 2022-02-23 PROCEDURE — 36415 COLL VENOUS BLD VENIPUNCTURE: CPT

## 2022-02-23 PROCEDURE — 99285 EMERGENCY DEPT VISIT HI MDM: CPT

## 2022-02-23 PROCEDURE — 85730 THROMBOPLASTIN TIME PARTIAL: CPT

## 2022-02-23 PROCEDURE — 94640 AIRWAY INHALATION TREATMENT: CPT

## 2022-02-23 PROCEDURE — 85025 COMPLETE CBC W/AUTO DIFF WBC: CPT

## 2022-02-23 PROCEDURE — 94664 DEMO&/EVAL PT USE INHALER: CPT

## 2022-02-23 PROCEDURE — 87040 BLOOD CULTURE FOR BACTERIA: CPT

## 2022-02-23 PROCEDURE — 84145 PROCALCITONIN (PCT): CPT

## 2022-02-23 PROCEDURE — 80053 COMPREHEN METABOLIC PANEL: CPT

## 2022-02-23 PROCEDURE — RECHECK: Performed by: PHYSICIAN ASSISTANT

## 2022-02-23 PROCEDURE — 1123F ACP DISCUSS/DSCN MKR DOCD: CPT | Performed by: INTERNAL MEDICINE

## 2022-02-23 PROCEDURE — 93005 ELECTROCARDIOGRAM TRACING: CPT

## 2022-02-23 PROCEDURE — 93306 TTE W/DOPPLER COMPLETE: CPT | Performed by: INTERNAL MEDICINE

## 2022-02-23 PROCEDURE — 93010 ELECTROCARDIOGRAM REPORT: CPT | Performed by: INTERNAL MEDICINE

## 2022-02-23 PROCEDURE — 71045 X-RAY EXAM CHEST 1 VIEW: CPT

## 2022-02-23 PROCEDURE — 99220 PR INITIAL OBSERVATION CARE/DAY 70 MINUTES: CPT | Performed by: INTERNAL MEDICINE

## 2022-02-23 PROCEDURE — 85610 PROTHROMBIN TIME: CPT

## 2022-02-23 PROCEDURE — 84443 ASSAY THYROID STIM HORMONE: CPT | Performed by: INTERNAL MEDICINE

## 2022-02-23 PROCEDURE — 99285 EMERGENCY DEPT VISIT HI MDM: CPT | Performed by: EMERGENCY MEDICINE

## 2022-02-23 RX ORDER — LEVOTHYROXINE SODIUM 0.05 MG/1
50 TABLET ORAL
Status: DISCONTINUED | OUTPATIENT
Start: 2022-02-23 | End: 2022-02-27 | Stop reason: HOSPADM

## 2022-02-23 RX ORDER — METHYLPREDNISOLONE SODIUM SUCCINATE 40 MG/ML
40 INJECTION, POWDER, LYOPHILIZED, FOR SOLUTION INTRAMUSCULAR; INTRAVENOUS ONCE
Status: COMPLETED | OUTPATIENT
Start: 2022-02-23 | End: 2022-02-23

## 2022-02-23 RX ORDER — LEVALBUTEROL 1.25 MG/.5ML
1.25 SOLUTION, CONCENTRATE RESPIRATORY (INHALATION) EVERY 6 HOURS PRN
Status: DISCONTINUED | OUTPATIENT
Start: 2022-02-23 | End: 2022-02-27 | Stop reason: HOSPADM

## 2022-02-23 RX ORDER — OSELTAMIVIR PHOSPHATE 30 MG/1
30 CAPSULE ORAL EVERY 12 HOURS SCHEDULED
Status: DISCONTINUED | OUTPATIENT
Start: 2022-02-23 | End: 2022-02-27 | Stop reason: HOSPADM

## 2022-02-23 RX ORDER — LISINOPRIL 20 MG/1
20 TABLET ORAL DAILY
Status: DISCONTINUED | OUTPATIENT
Start: 2022-02-23 | End: 2022-02-27 | Stop reason: HOSPADM

## 2022-02-23 RX ORDER — FLUTICASONE FUROATE AND VILANTEROL 200; 25 UG/1; UG/1
1 POWDER RESPIRATORY (INHALATION)
Status: DISCONTINUED | OUTPATIENT
Start: 2022-02-23 | End: 2022-02-27 | Stop reason: HOSPADM

## 2022-02-23 RX ORDER — SODIUM CHLORIDE 9 MG/ML
3 INJECTION INTRAVENOUS EVERY 12 HOURS SCHEDULED
Status: DISCONTINUED | OUTPATIENT
Start: 2022-02-23 | End: 2022-02-27 | Stop reason: HOSPADM

## 2022-02-23 RX ORDER — ACETAMINOPHEN 160 MG/5ML
650 SUSPENSION, ORAL (FINAL DOSE FORM) ORAL ONCE
Status: COMPLETED | OUTPATIENT
Start: 2022-02-23 | End: 2022-02-23

## 2022-02-23 RX ORDER — FLUTICASONE PROPIONATE 50 MCG
2 SPRAY, SUSPENSION (ML) NASAL DAILY
Status: DISCONTINUED | OUTPATIENT
Start: 2022-02-23 | End: 2022-02-27 | Stop reason: HOSPADM

## 2022-02-23 RX ORDER — ACETAMINOPHEN 325 MG/1
650 TABLET ORAL EVERY 6 HOURS PRN
Status: DISCONTINUED | OUTPATIENT
Start: 2022-02-23 | End: 2022-02-27 | Stop reason: HOSPADM

## 2022-02-23 RX ORDER — LEVALBUTEROL 1.25 MG/.5ML
1.25 SOLUTION, CONCENTRATE RESPIRATORY (INHALATION)
Status: DISCONTINUED | OUTPATIENT
Start: 2022-02-23 | End: 2022-02-27 | Stop reason: HOSPADM

## 2022-02-23 RX ORDER — TORSEMIDE 20 MG/1
10 TABLET ORAL DAILY
Status: DISCONTINUED | OUTPATIENT
Start: 2022-02-23 | End: 2022-02-27 | Stop reason: HOSPADM

## 2022-02-23 RX ORDER — BENZONATATE 100 MG/1
100 CAPSULE ORAL 3 TIMES DAILY PRN
Status: DISCONTINUED | OUTPATIENT
Start: 2022-02-23 | End: 2022-02-27 | Stop reason: HOSPADM

## 2022-02-23 RX ORDER — MELATONIN
1000 DAILY
Status: DISCONTINUED | OUTPATIENT
Start: 2022-02-23 | End: 2022-02-27 | Stop reason: HOSPADM

## 2022-02-23 RX ORDER — OXYBUTYNIN CHLORIDE 5 MG/1
5 TABLET, EXTENDED RELEASE ORAL DAILY
Status: DISCONTINUED | OUTPATIENT
Start: 2022-02-23 | End: 2022-02-27 | Stop reason: HOSPADM

## 2022-02-23 RX ORDER — CALCIUM CARBONATE 500(1250)
1 TABLET ORAL
Status: DISCONTINUED | OUTPATIENT
Start: 2022-02-23 | End: 2022-02-27 | Stop reason: HOSPADM

## 2022-02-23 RX ORDER — AMLODIPINE BESYLATE 5 MG/1
5 TABLET ORAL DAILY
Status: DISCONTINUED | OUTPATIENT
Start: 2022-02-23 | End: 2022-02-27 | Stop reason: HOSPADM

## 2022-02-23 RX ORDER — ONDANSETRON 2 MG/ML
4 INJECTION INTRAMUSCULAR; INTRAVENOUS EVERY 6 HOURS PRN
Status: DISCONTINUED | OUTPATIENT
Start: 2022-02-23 | End: 2022-02-27 | Stop reason: HOSPADM

## 2022-02-23 RX ORDER — BRIMONIDINE TARTRATE 2 MG/ML
1 SOLUTION/ DROPS OPHTHALMIC 2 TIMES DAILY
Status: DISCONTINUED | OUTPATIENT
Start: 2022-02-23 | End: 2022-02-27 | Stop reason: HOSPADM

## 2022-02-23 RX ORDER — SODIUM CHLORIDE FOR INHALATION 0.9 %
3 VIAL, NEBULIZER (ML) INHALATION EVERY 6 HOURS PRN
Status: DISCONTINUED | OUTPATIENT
Start: 2022-02-23 | End: 2022-02-27 | Stop reason: HOSPADM

## 2022-02-23 RX ORDER — SODIUM CHLORIDE FOR INHALATION 0.9 %
3 VIAL, NEBULIZER (ML) INHALATION
Status: DISCONTINUED | OUTPATIENT
Start: 2022-02-23 | End: 2022-02-27 | Stop reason: HOSPADM

## 2022-02-23 RX ORDER — GABAPENTIN 300 MG/1
300 CAPSULE ORAL
Status: DISCONTINUED | OUTPATIENT
Start: 2022-02-23 | End: 2022-02-27 | Stop reason: HOSPADM

## 2022-02-23 RX ORDER — LATANOPROST 50 UG/ML
1 SOLUTION/ DROPS OPHTHALMIC
Status: DISCONTINUED | OUTPATIENT
Start: 2022-02-23 | End: 2022-02-27 | Stop reason: HOSPADM

## 2022-02-23 RX ORDER — ASPIRIN 81 MG/1
81 TABLET ORAL DAILY
Status: DISCONTINUED | OUTPATIENT
Start: 2022-02-23 | End: 2022-02-27 | Stop reason: HOSPADM

## 2022-02-23 RX ADMIN — OSELTAMIVIR PHOSPHATE 30 MG: 30 CAPSULE ORAL at 21:50

## 2022-02-23 RX ADMIN — ISODIUM CHLORIDE 3 ML: 0.03 SOLUTION RESPIRATORY (INHALATION) at 07:43

## 2022-02-23 RX ADMIN — METOPROLOL TARTRATE 25 MG: 25 TABLET, FILM COATED ORAL at 21:50

## 2022-02-23 RX ADMIN — METHYLPREDNISOLONE SODIUM SUCCINATE 40 MG: 40 INJECTION, POWDER, FOR SOLUTION INTRAMUSCULAR; INTRAVENOUS at 05:01

## 2022-02-23 RX ADMIN — ENOXAPARIN SODIUM 60 MG: 60 INJECTION SUBCUTANEOUS at 21:50

## 2022-02-23 RX ADMIN — ISODIUM CHLORIDE 3 ML: 0.03 SOLUTION RESPIRATORY (INHALATION) at 19:41

## 2022-02-23 RX ADMIN — ISODIUM CHLORIDE 3 ML: 0.03 SOLUTION RESPIRATORY (INHALATION) at 13:20

## 2022-02-23 RX ADMIN — LEVALBUTEROL HYDROCHLORIDE 1.25 MG: 1.25 SOLUTION, CONCENTRATE RESPIRATORY (INHALATION) at 13:20

## 2022-02-23 RX ADMIN — OSELTAMIVIR PHOSPHATE 30 MG: 30 CAPSULE ORAL at 06:29

## 2022-02-23 RX ADMIN — LEVOTHYROXINE SODIUM 50 MCG: 50 TABLET ORAL at 09:07

## 2022-02-23 RX ADMIN — ENOXAPARIN SODIUM 60 MG: 60 INJECTION SUBCUTANEOUS at 09:06

## 2022-02-23 RX ADMIN — Medication 1000 UNITS: at 09:08

## 2022-02-23 RX ADMIN — BRIMONIDINE TARTRATE 1 DROP: 2 SOLUTION OPHTHALMIC at 18:51

## 2022-02-23 RX ADMIN — SODIUM CHLORIDE 3 ML: 9 INJECTION, SOLUTION INTRAMUSCULAR; INTRAVENOUS; SUBCUTANEOUS at 01:44

## 2022-02-23 RX ADMIN — GABAPENTIN 300 MG: 300 CAPSULE ORAL at 21:50

## 2022-02-23 RX ADMIN — LATANOPROST 1 DROP: 50 SOLUTION OPHTHALMIC at 22:20

## 2022-02-23 RX ADMIN — SODIUM CHLORIDE 3 ML: 9 INJECTION, SOLUTION INTRAMUSCULAR; INTRAVENOUS; SUBCUTANEOUS at 11:32

## 2022-02-23 RX ADMIN — LISINOPRIL 20 MG: 20 TABLET ORAL at 09:07

## 2022-02-23 RX ADMIN — TORSEMIDE 10 MG: 20 TABLET ORAL at 09:08

## 2022-02-23 RX ADMIN — ASPIRIN 81 MG: 81 TABLET, COATED ORAL at 09:07

## 2022-02-23 RX ADMIN — OXYBUTYNIN CHLORIDE 5 MG: 5 TABLET, EXTENDED RELEASE ORAL at 09:07

## 2022-02-23 RX ADMIN — LEVALBUTEROL HYDROCHLORIDE 1.25 MG: 1.25 SOLUTION, CONCENTRATE RESPIRATORY (INHALATION) at 19:41

## 2022-02-23 RX ADMIN — AMLODIPINE BESYLATE 5 MG: 5 TABLET ORAL at 09:07

## 2022-02-23 RX ADMIN — LEVALBUTEROL HYDROCHLORIDE 1.25 MG: 1.25 SOLUTION, CONCENTRATE RESPIRATORY (INHALATION) at 07:42

## 2022-02-23 NOTE — ASSESSMENT & PLAN NOTE
· Mostly likely due to influenza A infection, see plan above  · CXR without acute cardiopulmonary disease   · Currently requiring 3 L NC with SpO2 mis-90s at rest   · Monitor and titrate supplemental oxygen as needed   · Respiratory protocol

## 2022-02-23 NOTE — H&P
[address] [facility]  H&P- Sheila Jiang 8/14/1930, 80 y o  female MRN: [id number]  Unit/Bed#: ED 23 Encounter: [id number]  Primary Care Provider: Mookie Novoa DO   Date and time admitted to hospital: 2/23/2022  1:08 AM    * Influenza A with respiratory manifestations  Assessment & Plan  · Present with fever, cough, shortness for breath  Requiring 3 L NC on admission to maintain appropriate saturations  · Found positive for influenza A, COVID-19 negative  Procalcitonin WNL  · Start Tamiflu  · Supportive care with p r n  nebulizers  · Continue supplemental oxygen, titrate as needed to maintain SaO2 greater than 88%  · Respiratory protocol, incentive spirometry, pulmonary toileting    Tachycardia  Assessment & Plan  · Tachycardia on examination, appears atrial tachycardia on EKG/telemetry  · Suspect secondary to influenza a infection with asthma, treatment plan as above for these  · Continue hemodynamic monitoring  · ADDENDUM: subsequent telemetry readings suspicious for Atrial fibrillation, no history of this noted on chart  Currently rate controlled  ?provoked by influenza? Continue telemetry monitoring now  Check TSH, Echo    CHADS2-VASc 5, will escalate Lovenox to WB BID for now    Chronic diastolic CHF (congestive heart failure) ([facility] [address] )  Assessment & Plan  Wt Readings from Last 3 Encounters:   01/10/22 58 6 kg (129 lb 3 2 oz)   12/14/21 59 9 kg (132 lb)   08/11/21 58 1 kg (128 lb)     · Appears euvolemic  · Continue home cardiac medications  · Monitor daily weights, I/O, volume status        Hypertension  Assessment & Plan  · Continue home antihypertensives with parameters  · Monitor blood pressure per protocol    Asthma  Assessment & Plan  · Some wheezing noted on examination, suspect triggered by influenza a infection  · Trial dose of IV Solu-Medrol now, nebulizers  · Continue with home inhalers/nebulizers otherwise      VTE Prophylaxis: Enoxaparin (Lovenox)  / sequential compression device   Code Status: Level 1 - Full Code  POLST: POLST form is not discussed and not completed at this time  Discussion with family:  Offered however patient declines at this time    Anticipated Length of Stay:  Patient will be admitted on an Observation basis with an anticipated length of stay of  less than 2 midnights  Justification for Hospital Stay: Please see detailed plans noted above  Chief Complaint:     Shortness of breath  History of Present Illness:  Kianna Leigh is a 80 y o  female who presented from assisted living with increased shortness breath along with fever and cough  She has past medical history significant for asthma, chronic diastolic heart failure, hypertension, hypothyroidism  Symptoms apparently have been ongoing for the past 24-48 hours at her assisted living and progressive this span, with symptoms initially present only with dyspnea but this evening progressed to dyspnea at rest additionally with orthopnea which prompted presentation  It was noted that there was an influenza outbreak at her assisted living facility; the patient states to me that she did not believe she received a flu vaccine this year  She denied any chest pain/pressure, dizziness/lightheadedness, weight gain, increased edema, or PND  During ED evaluation here she was found positive for influenza A, additionally noted tachycardic and hypoxic requiring 3 L NC to maintain appropriate saturations, and this setting is admitted for further management of influenza with respiratory manifestations  Currently, patient is lying in bed in no acute distress and comfortable appearing  She notes improvement in symptoms with supplemental oxygen administration      Review of Systems:    Constitutional:  Endorsed fever and chills  Eyes:  Denies change in visual acuity   HENT:  Denies nasal congestion or sore throat   Respiratory:  Endorsed cough and shortness of breath  Cardiovascular:  Denies chest pain or edema   GI: Denies abdominal pain, nausea, vomiting, bloody stools or diarrhea   :  Denies dysuria   Musculoskeletal:  Denies back pain or joint pain   Integument:  Denies rash   Neurologic:  Denies headache, focal weakness or sensory changes   Endocrine:  Denies polyuria or polydipsia   Lymphatic:  Denies swollen glands   Psychiatric:  Denies depression or anxiety     Past Medical and Surgical History:   Past Medical History:   Diagnosis Date    Anxiety     Arthritis     OA    Asthma     good control    Burn     Chronic pain disorder     spinal stenosis    GERD (gastroesophageal reflux disease)     off and on    Glaucoma     Hiatal hernia     HLD (hyperlipidemia)     Hx of fall 2015    Hypertension     PVD (peripheral vascular disease) ([facility] [address] )     Thyroid disease     hypo     Past Surgical History:   Procedure Laterality Date    ANGIOPLASTY      jacquelyn lower legs    BLADDER SUSPENSION      with mesh    CATARACT EXTRACTION Bilateral     COLONOSCOPY      COLONOSCOPY W/ POLYPECTOMY      via Colostomy; Pt does not have colostomy - no listing for a polypectomy of colon per [employer]    DILATION AND CURETTAGE OF UTERUS      NASAL POLYP EXCISION      Nasal Endoscopy Polypectomy    OTHER SURGICAL HISTORY  04/2007    Uterine prolapse and repair     PA REVISE MEDIAN N/CARPAL TUNNEL SURG Left 1/10/2019    Procedure: RELEASE LEFT CARPAL TUNNEL;  Surgeon: Abiodun Damian MD;  Location:  MAIN OR;  Service: Orthopedics       Meds/Allergies:    Current Facility-Administered Medications:     acetaminophen (TYLENOL) tablet 650 mg, 650 mg, Oral, Q6H PRN, Iza Castanonz, DO    amLODIPine (NORVASC) tablet 5 mg, 5 mg, Oral, Daily, Iza Harvinder, DO    aspirin (ECOTRIN LOW STRENGTH) EC tablet 81 mg, 81 mg, Oral, Daily, Iza Harvinder, DO    benzonatate (TESSALON PERLES) capsule 100 mg, 100 mg, Oral, TID PRN, Iza Blanton, DO    brimonidine tartrate 0 2 % ophthalmic solution 1 drop, 1 drop, Both Eyes, BID, Iza Blanton, DO    calcium carbonate (OYSTER SHELL,OSCAL) 500 mg tablet 1 tablet, 1 tablet, Oral, Daily With Breakfast, Derik Chan DO    cholecalciferol (VITAMIN D3) tablet 1,000 Units, 1,000 Units, Oral, Daily, Derik Chan DO    enoxaparin (LOVENOX) subcutaneous injection 40 mg, 40 mg, Subcutaneous, Daily, Derik Chan DO    fluticasone (FLONASE) 50 mcg/act nasal spray 2 spray, 2 spray, Each Nare, Daily, Derik Chan DO    fluticasone-vilanterol (BREO ELLIPTA) 200-25 MCG/INH inhaler 1 puff, 1 puff, Inhalation, Daily, Derik Chan DO    gabapentin (NEURONTIN) capsule 300 mg, 300 mg, Oral, HS, Derik Chan DO    latanoprost (XALATAN) 0 005 % ophthalmic solution 1 drop, 1 drop, Both Eyes, HS, Derik Chan DO    levalbuterol (XOPENEX) inhalation solution 1 25 mg, 1 25 mg, Nebulization, TID **AND** sodium chloride 0 9 % inhalation solution 3 mL, 3 mL, Nebulization, TID, Derik Chan DO    levalbuterol [facility]) inhalation solution 1 25 mg, 1 25 mg, Nebulization, Q6H PRN **AND** sodium chloride 0 9 % inhalation solution 3 mL, 3 mL, Nebulization, Q6H PRN, Derik Chan DO    levothyroxine tablet 50 mcg, 50 mcg, Oral, Daily, Derik Chan DO    lisinopril (ZESTRIL) tablet 20 mg, 20 mg, Oral, Daily, Derik Chan DO    methylPREDNISolone sodium succinate (Solu-MEDROL) injection 40 mg, 40 mg, Intravenous, Once, Derik Chan DO    ondansetron [facility]) injection 4 mg, 4 mg, Intravenous, Q6H PRN, Derik Chan DO    oseltamivir (TAMIFLU) capsule 30 mg, 30 mg, Oral, Q12H [address], Derik Chan DO    oxybutynin (DITROPAN-XL) 24 hr tablet 5 mg, 5 mg, Oral, Daily, Derik Chan DO    Insert peripheral IV, , , Once **AND** sodium chloride (PF) 0 9 % injection 3 mL, 3 mL, Intravenous, Q12H [facility], Derik Chan DO, 3 mL at 02/23/22 0144    torsemide (DEMADEX) tablet 10 mg, 10 mg, Oral, Daily, Derik Chan DO    Current Outpatient Medications:     albuterol (Ventolin HFA) 90 mcg/act inhaler, Inhale 2 puffs 3 (three) times a day, Disp: 1 Inhaler, Rfl: 2    Alphagan P 0 1 %, , Disp: , Rfl:     amLODIPine (NORVASC) 5 mg tablet, Take 1 tablet (5 mg total) by mouth daily, Disp: 90 tablet, Rfl: 1    aspirin 81 MG tablet, Take 81 mg by mouth daily  , Disp: , Rfl:     Biotin 5 MG CAPS, Take by mouth, Disp: , Rfl:     brimonidine tartrate 0 2 % ophthalmic solution, Administer 1 drop to both eyes 2 (two) times a day  , Disp: , Rfl:     CALCIUM PO, Take by mouth, Disp: , Rfl:     fluticasone (FLONASE) 50 mcg/act nasal spray, 2 SPRAYS IN EACH NOSTRIL EVERY DAY, Disp: 16 g, Rfl: 10    fluticasone-salmeterol (ADVAIR, WIXELA) 250-50 mcg/dose inhaler, Inhale 1 puff 2 (two) times a day Rinse mouth after use , Disp: 1 Inhaler, Rfl: 2    furosemide (LASIX) 20 mg tablet, , Disp: , Rfl:     gabapentin (NEURONTIN) 300 mg capsule, Take 1 capsule at hs, Disp: 90 capsule, Rfl: 1    latanoprost (XALATAN) 0 005 % ophthalmic solution, Administer 1 drop to both eyes daily at bedtime  , Disp: , Rfl:     levothyroxine 50 mcg tablet, Take 1 tablet (50 mcg total) by mouth daily, Disp: 90 tablet, Rfl: 1    lisinopril (ZESTRIL) 20 mg tablet, Take 1 tablet (20 mg total) by mouth daily, Disp: 90 tablet, Rfl: 1    Mirabegron ER (Myrbetriq) 25 MG TB24, Take 25 mg by mouth daily, Disp: , Rfl:     mometasone (ELOCON) 0 1 % cream, Apply topically daily, Disp: 45 g, Rfl: 0    Omega-3 Fatty Acids (EQL OMEGA 3 FISH OIL) 1200 MG CAPS, Take by mouth, Disp: , Rfl:     oxyCODONE (ROXICODONE) 5 mg immediate release tablet, Take 1 tablet (5 mg total) by mouth every 4 (four) hours as needed for moderate pain or severe painMax Daily Amount: 30 mg, Disp: 10 tablet, Rfl: 0    torsemide (DEMADEX) 10 mg tablet, TAKE 1 TABLET (10 MG TOTAL) BY MOUTH DAILY, Disp: 30 tablet, Rfl: 10    Vitamin D High Potency 25 MCG (1000 UT) capsule, GIVE 1 CAP BY MOUTH EVERY DAY DX: SUPPLEMENT, Disp: 30 capsule, Rfl: 10    Allergies:    Allergies Allergen Reactions    Cephalexin Nausea Only    Erythromycin     Phenazopyridine     Piroxicam     Sulfamethoxazole-Trimethoprim      History:  Marital Status:      Substance Use History:   Social History     Substance and Sexual Activity   Alcohol Use Yes    Alcohol/week: 1 0 standard drink    Types: 1 Glasses of wine per week    Comment: rare     Social History     Tobacco Use   Smoking Status Never Smoker   Smokeless Tobacco Never Used     Social History     Substance and Sexual Activity   Drug Use No       Family History:  Family History   Problem Relation Age of Onset    Other Family         Back Pain     Hypertension Family     Thyroid disease Family         Disorder       Physical Exam:     Vitals:   Blood Pressure: 149/74 (02/23/22 0115)  Pulse: (!) 112 (02/23/22 0200)  Temperature: 100 3 °F (37 9 °C) (02/23/22 0115)  Temp Source: Oral (02/23/22 0115)  Respirations: 20 (02/23/22 0200)  SpO2: 96 % (02/23/22 0200)    Constitutional:  Well developed, well nourished, no acute distress, non-toxic appearance   Eyes:  PERRL, conjunctiva normal   HENT:  Atraumatic, external ears normal, nose normal, oropharynx moist, no pharyngeal exudates  Neck- normal range of motion, no tenderness, supple   Respiratory:  No respiratory distress, no rales, mild bilateral expiratory wheezing  Cardiovascular:  Tachycardic rate, normal rhythm, no murmurs, no gallops, no rubs   GI:  Soft, nondistended, normal bowel sounds, nontender, no organomegaly, no mass, no rebound, no guarding   :  No costovertebral angle tenderness   Musculoskeletal:  No edema, no tenderness, no deformities   Back- no tenderness  Integument:  Well hydrated, no rash   Lymphatic:  No lymphadenopathy noted   Neurologic:  Alert &awake, communicative, CN 2-12 normal, normal motor function, normal sensory function, no focal deficits noted   Psychiatric:  Speech and behavior appropriate       Lab Results: I have personally reviewed pertinent reports  Results from last 7 days   Lab Units 02/23/22  0129   WBC Thousand/uL 10 23*   HEMOGLOBIN g/dL 11 6   HEMATOCRIT % 35 5   PLATELETS Thousands/uL 224   NEUTROS PCT % 88*   LYMPHS PCT % 7*   MONOS PCT % 5   EOS PCT % 0     Results from last 7 days   Lab Units 02/23/22  0129   POTASSIUM mmol/L 4 0   CHLORIDE mmol/L 102   CO2 mmol/L 27   BUN mg/dL 22   CREATININE mg/dL 0 77   CALCIUM mg/dL 9 5   ALK PHOS U/L 99   ALT U/L 27   AST U/L 27     Results from last 7 days   Lab Units 02/23/22  0129   INR  1 07       EKG:  Possible atrial tachycardia     Imaging: I have personally reviewed pertinent films in PACS    CXR:  Personally reviewed, no acute cardiopulmonary disease visualized  Final radiologist read is pending  ** Please Note: Dragon 360 Dictation voice to text software was used in the creation of this document   **

## 2022-02-23 NOTE — ASSESSMENT & PLAN NOTE
· Some wheezing noted on examination, suspect triggered by influenza a infection  · Trial dose of IV Solu-Medrol now, nebulizers  · Continue with home inhalers/nebulizers otherwise

## 2022-02-23 NOTE — PROGRESS NOTES
[address] [facility]  Progress Note Safia Zamorano 8/14/1930, 80 y o  female MRN: [id number]  Unit/Bed#: ED 23 Encounter: [id number]  Primary Care Provider: Eda Arriaga DO   Date and time admitted to hospital: 2/23/2022  1:08 AM    [facility] Post Admit Check:     Date of visit: 02/23/22    The patient was admitted earlier to the [facility]  Please see initial intake history and physical for detailed admission history  This is a supplemental update following a post admit checkup  Subjective: patient reports improvement in her breathing after receiving steroids and nebulizer  Denies chest pain or SOB currently  Discussed positive flu swab and new onset atrial fibrillation  Exam:   General: awake, alert, oriented, sitting up in bed, no acute distress   Heart: rhythm irregularly irregular with rates 110-120 on monitor   Lungs: course breath sounds bilaterally throughout, no discernable wheeze, on 3 L NC    Abdomen: soft, nontender, nondistended    Assessment and Plan:     * Influenza A with respiratory manifestations  Assessment & Plan  · Present with fever, cough, shortness for breath  Requiring 3 L NC on admission to maintain appropriate saturations  · Found positive for influenza A, COVID-19 negative  Procalcitonin WNL  · Continue with Tamiflu  · Supportive care with p r n  nebulizers  · Respiratory protocol, incentive spirometry, pulmonary toileting    New onset atrial fibrillation ([facility] [address] )  Assessment & Plan  · Confirmed with EKG  ? provoked by influenza  · TSH within normal limits   · Echocardiogram pending   · CHADS2-VASc 5, currently on weight based Lovenox  Will request CM price check Eliquis     · Currently with rates 110-120 so will start metoprolol 25 mg BID for now  · Appreicate cardiology consult and recommendations   · Continue telemetry monitoring     Acute respiratory failure with hypoxia ([facility] [address] )  Assessment & Plan  · Mostly likely due to influenza A infection, see plan above  · CXR without acute cardiopulmonary disease   · Currently requiring 3 L NC with SpO2 mis-90s at rest   · Monitor and titrate supplemental oxygen as needed   · Respiratory protocol    Asthma  Assessment & Plan  · Wheezing noted on admission, suspect triggered by influenza A infection  · Received one time dose 40 mg IV Solu-Medrol and nebulizers in ED with improvement   · Continue with home inhalers/nebulizers   · Respiratory protocol     Chronic diastolic CHF (congestive heart failure) ([facility] [address] )  Assessment & Plan  Wt Readings from Last 3 Encounters:   01/10/22 58 6 kg (129 lb 3 2 oz)   12/14/21 59 9 kg (132 lb)   08/11/21 58 1 kg (128 lb)     · Appears euvolemic  · Continue home cardiac medications  · Monitor daily weights, I/O, volume status    Hypertension  Assessment & Plan  · BP acceptable, monitor routinely   · Continue home dose Norvasc and lisinopril   · Start metoprolol for rate control as above   · Appreciate cardiology inputs       [facility], [place]

## 2022-02-23 NOTE — ASSESSMENT & PLAN NOTE
Wt Readings from Last 3 Encounters:   01/10/22 58 6 kg (129 lb 3 2 oz)   12/14/21 59 9 kg (132 lb)   08/11/21 58 1 kg (128 lb)     · Appears euvolemic  · Continue home cardiac medications  · Monitor daily weights, I/O, volume status

## 2022-02-23 NOTE — ASSESSMENT & PLAN NOTE
· Confirmed with EKG  ? provoked by influenza  · TSH within normal limits   · Echocardiogram pending   · CHADS2-VASc 5, currently on weight based Lovenox  Will request CM price check Eliquis     · Currently with rates 110-120 so will start metoprolol 25 mg BID for now  · Appreicate cardiology consult and recommendations   · Continue telemetry monitoring

## 2022-02-23 NOTE — ED NOTES
Pt found sleeping, sitting up on side of bed, NC O2 removed; room air POX 86%  Pt changed into pt gown, placed on 3L NC O2; now @ 94%, and harmony applied for pt comfort       [employer], RN  02/23/22 [id number]

## 2022-02-23 NOTE — ASSESSMENT & PLAN NOTE
· Tachycardia on examination, appears atrial tachycardia on EKG/telemetry  · Suspect secondary to influenza a infection with asthma, treatment plan as above for these  · Continue hemodynamic monitoring

## 2022-02-23 NOTE — ED PROVIDER NOTES
History  Chief Complaint   Patient presents with    Cough     [facility] via [facility] for cough and desat     Patient is a 79 y/o F with PMH PVD, hypothyroid presenting from nursing home with fever and shortness of breath  Patient states she started having new onset cough and shortness of breath over the weekend  Today had a fever to 101 and an episode of desaturating to the low 90s, so was sent to the ER for further evaluation  Per report, nursing home with recent outbreak of influenza  Patient denying chest pain, nausea, vomiting, diarrhea, myalgias, headaches  Patient notes mild cough that is occasionally productive  She denies leg swelling, history of PE  Prior to Admission Medications   Prescriptions Last Dose Informant Patient Reported? Taking? Alphagan P 0 1 %   Yes No   Biotin 5 MG CAPS   Yes No   Sig: Take by mouth   CALCIUM PO  Self Yes No   Sig: Take by mouth   Mirabegron ER (Myrbetriq) 25 MG TB24   Yes No   Sig: Take 25 mg by mouth daily   Omega-3 Fatty Acids (EQL OMEGA 3 FISH OIL) 1200 MG CAPS   Yes No   Sig: Take by mouth   Vitamin D High Potency 25 MCG (1000 UT) capsule   No No   Sig: GIVE 1 CAP BY MOUTH EVERY DAY DX: SUPPLEMENT   albuterol (Ventolin HFA) 90 mcg/act inhaler   No No   Sig: Inhale 2 puffs 3 (three) times a day   amLODIPine (NORVASC) 5 mg tablet   No No   Sig: Take 1 tablet (5 mg total) by mouth daily   aspirin 81 MG tablet   Yes No   Sig: Take 81 mg by mouth daily     brimonidine tartrate 0 2 % ophthalmic solution   Yes No   Sig: Administer 1 drop to both eyes 2 (two) times a day     fluticasone (FLONASE) 50 mcg/act nasal spray   No No   Si SPRAYS IN EACH NOSTRIL EVERY DAY   fluticasone-salmeterol (ADVAIR, WIXELA) 250-50 mcg/dose inhaler   No No   Sig: Inhale 1 puff 2 (two) times a day Rinse mouth after use     furosemide (LASIX) 20 mg tablet   Yes No   gabapentin (NEURONTIN) 300 mg capsule   No No   Sig: Take 1 capsule at hs   latanoprost (XALATAN) 0 005 % ophthalmic solution   Yes No   Sig: Administer 1 drop to both eyes daily at bedtime     levothyroxine 50 mcg tablet   No No   Sig: Take 1 tablet (50 mcg total) by mouth daily   lisinopril (ZESTRIL) 20 mg tablet   No No   Sig: Take 1 tablet (20 mg total) by mouth daily   mometasone (ELOCON) 0 1 % cream   No No   Sig: Apply topically daily   oxyCODONE (ROXICODONE) 5 mg immediate release tablet   No No   Sig: Take 1 tablet (5 mg total) by mouth every 4 (four) hours as needed for moderate pain or severe painMax Daily Amount: 30 mg   torsemide (DEMADEX) 10 mg tablet   No No   Sig: TAKE 1 TABLET (10 MG TOTAL) BY MOUTH DAILY      Facility-Administered Medications: None       Past Medical History:   Diagnosis Date    Anxiety     Arthritis     OA    Asthma     good control    Burn     Chronic pain disorder     spinal stenosis    GERD (gastroesophageal reflux disease)     off and on    Glaucoma     Hiatal hernia     HLD (hyperlipidemia)     Hx of fall 2015    Hypertension     PVD [facility]peripheral vascular disease) ([facility] [address] )     Thyroid disease     hypo       Past Surgical History:   Procedure Laterality Date    ANGIOPLASTY      jacquelyn lower legs    BLADDER SUSPENSION      with mesh    CATARACT EXTRACTION Bilateral     COLONOSCOPY      COLONOSCOPY W/ POLYPECTOMY      via Colostomy; Pt does not have colostomy - no listing for a polypectomy of colon per [employer]    DILATION AND CURETTAGE OF UTERUS      NASAL POLYP EXCISION      Nasal Endoscopy Polypectomy    OTHER SURGICAL HISTORY  04/2007    Uterine prolapse and repair     MN REVISE MEDIAN N/CARPAL TUNNEL SURG Left 1/10/2019    Procedure: RELEASE LEFT CARPAL TUNNEL;  Surgeon: Kimberly Feng MD;  Location: [address] [place] [address];  Service: Orthopedics       Family History   Problem Relation Age of Onset    Other Family         Back Pain     Hypertension Family     Thyroid disease Family         Disorder     I have reviewed and agree with the history as documented  E-Cigarette/Vaping    E-Cigarette Use Never User      E-Cigarette/Vaping Substances    Nicotine No     THC No     CBD No     Flavoring No     Other No     Unknown No      Social History     Tobacco Use    Smoking status: Never Smoker    Smokeless tobacco: Never Used   Vaping Use    Vaping Use: Never used   Substance Use Topics    Alcohol use: Yes     Alcohol/week: 1 0 standard drink     Types: 1 Glasses of wine per week     Comment: rare    Drug use: No        Review of Systems   Constitutional: Positive for fever  Negative for chills  HENT: Negative for ear pain and sore throat  Eyes: Negative for pain and visual disturbance  Respiratory: Positive for cough and shortness of breath  Cardiovascular: Negative for chest pain and palpitations  Gastrointestinal: Negative for abdominal pain and vomiting  Genitourinary: Negative for dysuria and hematuria  Musculoskeletal: Negative for arthralgias and back pain  Skin: Negative for color change and rash  Neurological: Negative for seizures and syncope  All other systems reviewed and are negative  Physical Exam  ED Triage Vitals [02/23/22 0115]   Temperature Pulse Respirations Blood Pressure SpO2   100 3 °F (37 9 °C) (!) 114 16 149/74 92 %      Temp Source Heart Rate Source Patient Position - Orthostatic VS BP Location FiO2 (%)   Oral Monitor -- -- --      Pain Score       --             Orthostatic Vital Signs  Vitals:    02/23/22 0115 02/23/22 0200 02/23/22 0430   BP: 149/74     Pulse: (!) 114 (!) 112 (!) 120       Physical Exam  Vitals and nursing note reviewed  Constitutional:       General: She is not in acute distress  HENT:      Head: Normocephalic and atraumatic  Right Ear: External ear normal       Left Ear: External ear normal       Nose: Nose normal       Mouth/Throat:      Pharynx: Oropharynx is clear  No oropharyngeal exudate  Eyes:      Extraocular Movements: Extraocular movements intact  Pupils: Pupils are equal, round, and reactive to light  Cardiovascular:      Rate and Rhythm: Regular rhythm  Tachycardia present  Pulses: Normal pulses  Heart sounds: Normal heart sounds  No murmur heard  No friction rub  No gallop  Pulmonary:      Effort: Pulmonary effort is normal       Breath sounds: Rhonchi present  Comments: Sat 95% on 3L  Abdominal:      General: Abdomen is flat  There is no distension  Palpations: Abdomen is soft  Tenderness: There is no abdominal tenderness  There is no guarding or rebound  Musculoskeletal:         General: No deformity  Normal range of motion  Cervical back: Normal range of motion  Right lower leg: No edema  Left lower leg: No edema  Skin:     General: Skin is warm and dry  Capillary Refill: Capillary refill takes less than 2 seconds  Findings: No rash  Neurological:      General: No focal deficit present  Mental Status: She is alert and oriented to person, place, and time        Gait: Gait normal    Psychiatric:         Mood and Affect: Mood normal          ED Medications  Medications   sodium chloride (PF) 0 9 % injection 3 mL (3 mL Intravenous Given 2/23/22 0144)   amLODIPine (NORVASC) tablet 5 mg (has no administration in time range)   aspirin (ECOTRIN LOW STRENGTH) EC tablet 81 mg (has no administration in time range)   brimonidine tartrate 0 2 % ophthalmic solution 1 drop (has no administration in time range)   calcium carbonate (OYSTER SHELL,OSCAL) 500 mg tablet 1 tablet (has no administration in time range)   fluticasone (FLONASE) 50 mcg/act nasal spray 2 spray (has no administration in time range)   fluticasone-vilanterol (BREO ELLIPTA) 200-25 MCG/INH inhaler 1 puff (has no administration in time range)   gabapentin (NEURONTIN) capsule 300 mg (has no administration in time range)   latanoprost (XALATAN) 0 005 % ophthalmic solution 1 drop (has no administration in time range)   levothyroxine tablet 50 mcg (has no administration in time range)   lisinopril (ZESTRIL) tablet 20 mg (has no administration in time range)   oxybutynin (DITROPAN-XL) 24 hr tablet 5 mg (has no administration in time range)   torsemide (DEMADEX) tablet 10 mg (has no administration in time range)   cholecalciferol (VITAMIN D3) tablet 1,000 Units (has no administration in time range)   oseltamivir (TAMIFLU) capsule 30 mg (has no administration in time range)   ondansetron (ZOFRAN) injection 4 mg (has no administration in time range)   acetaminophen (TYLENOL) tablet 650 mg (has no administration in time range)   benzonatate (TESSALON PERLES) capsule 100 mg (has no administration in time range)   levalbuterol (XOPENEX) inhalation solution 1 25 mg (has no administration in time range)     And   sodium chloride 0 9 % inhalation solution 3 mL (has no administration in time range)   levalbuterol (XOPENEX) inhalation solution 1 25 mg (has no administration in time range)     And   sodium chloride 0 9 % inhalation solution 3 mL (has no administration in time range)   methylPREDNISolone sodium succinate (Solu-MEDROL) injection 40 mg (has no administration in time range)   enoxaparin (LOVENOX) subcutaneous injection 40 mg (has no administration in time range)   acetaminophen (TYLENOL) oral suspension 650 mg (0 mg Oral Given to EMS 2/23/22 0117)       Diagnostic Studies  Results Reviewed     Procedure Component Value Units Date/Time    MRSA culture [[id number]]     Lab Status: No result Specimen: Nares from Nose     Procalcitonin with AM Reflex [[id number]]  (Normal) Collected: 02/23/22 0129    Lab Status: Final result Specimen: Blood from Arm, Left Updated: 02/23/22 0235     Procalcitonin 0 11 ng/ml     Procalcitonin Reflex [[id number]]     Lab Status: No result Specimen: Blood     COVID/FLU/RSV - 2 hour TAT [[id number]]  (Abnormal) Collected: 02/23/22 0129    Lab Status: Final result Specimen: Nares from Nose Updated: 02/23/22 0224     SARS-CoV-2 Negative     INFLUENZA A PCR Positive     INFLUENZA B PCR Negative     RSV PCR Negative    Narrative:      FOR PEDIATRIC PATIENTS - copy/paste COVID Guidelines URL to browser: [email or web address]  [email or web address]    SARS-CoV-2 assay is a Nucleic Acid Amplification assay intended for the  qualitative detection of nucleic acid from SARS-CoV-2 in nasopharyngeal  swabs  Results are for the presumptive identification of SARS-CoV-2 RNA  Positive results are indicative of infection with SARS-CoV-2, the virus  causing COVID-19, but do not rule out bacterial infection or co-infection  with other viruses  Laboratories within the [place] and its  territories are required to report all positive results to the appropriate  public health authorities  Negative results do not preclude SARS-CoV-2  infection and should not be used as the sole basis for treatment or other  patient management decisions  Negative results must be combined with  clinical observations, patient history, and epidemiological information  This test has not been FDA cleared or approved  This test has been authorized by FDA under an Emergency Use Authorization  (EUA)  This test is only authorized for the duration of time the  declaration that circumstances exist justifying the authorization of the  emergency use of an in vitro diagnostic tests for detection of SARS-CoV-2  virus and/or diagnosis of COVID-19 infection under section 564(b)(1) of  the Act, 21 U  S C  194QSR-0(Y)(8), unless the authorization is terminated  or revoked sooner  The test has been validated but independent review by FDA  and CLIA is pending  Test performed using [employer] GeneXpert: This RT-PCR assay targets N2,  a region unique to SARS-CoV-2  A conserved region in the E-gene was chosen  for pan-Sarbecovirus detection which includes SARS-CoV-2      Lactic acid [[id number]]  (Normal) Collected: 02/23/22 0129    Lab Status: Final result Specimen: Blood from Arm, Left Updated: 02/23/22 0215     LACTIC ACID 1 6 mmol/L     Narrative:      Result may be elevated if tourniquet was used during collection      Protime-INR [[id number]]  (Normal) Collected: 02/23/22 0129    Lab Status: Final result Specimen: Blood from Arm, Left Updated: 02/23/22 0211     Protime 13 5 seconds      INR 1 07    APTT [[id number]]  (Normal) Collected: 02/23/22 0129    Lab Status: Final result Specimen: Blood from Arm, Left Updated: 02/23/22 0211     PTT 29 seconds     Comprehensive metabolic panel [[id number]]  (Abnormal) Collected: 02/23/22 0129    Lab Status: Final result Specimen: Blood from Arm, Left Updated: 02/23/22 0208     Sodium 136 mmol/L      Potassium 4 0 mmol/L      Chloride 102 mmol/L      CO2 27 mmol/L      ANION GAP 7 mmol/L      BUN 22 mg/dL      Creatinine 0 77 mg/dL      Glucose 159 mg/dL      Calcium 9 5 mg/dL      Corrected Calcium 10 0 mg/dL      AST 27 U/L      ALT 27 U/L      Alkaline Phosphatase 99 U/L      Total Protein 8 0 g/dL      Albumin 3 4 g/dL      Total Bilirubin 0 53 mg/dL      eGFR 67 ml/min/1 73sq m     Narrative:      [place] guidelines for Chronic Kidney Disease (CKD):     Stage 1 with normal or high GFR (GFR > 90 mL/min/1 73 square meters)    Stage 2 Mild CKD (GFR = 60-89 mL/min/1 73 square meters)    Stage 3A Moderate CKD (GFR = 45-59 mL/min/1 73 square meters)    Stage 3B Moderate CKD (GFR = 30-44 mL/min/1 73 square meters)    Stage 4 Severe CKD (GFR = 15-29 mL/min/1 73 square meters)    Stage 5 End Stage CKD (GFR <15 mL/min/1 73 square meters)  Note: GFR calculation is accurate only with a steady state creatinine    CBC and differential [[id number]]  (Abnormal) Collected: 02/23/22 0129    Lab Status: Final result Specimen: Blood from Arm, Left Updated: 02/23/22 0152     WBC 10 23 Thousand/uL      RBC 4 10 Million/uL      Hemoglobin 11 6 g/dL      Hematocrit 35 5 %      MCV 87 fL      MCH 28 3 pg      MCHC 32 7 g/dL      RDW 14 6 %      MPV 12 4 fL      Platelets 142 Thousands/uL      nRBC 0 /100 WBCs      Neutrophils Relative 88 %      Immat GRANS % 0 %      Lymphocytes Relative 7 %      Monocytes Relative 5 %      Eosinophils Relative 0 %      Basophils Relative 0 %      Neutrophils Absolute 8 83 Thousands/µL      Immature Grans Absolute 0 03 Thousand/uL      Lymphocytes Absolute 0 76 Thousands/µL      Monocytes Absolute 0 54 Thousand/µL      Eosinophils Absolute 0 04 Thousand/µL      Basophils Absolute 0 03 Thousands/µL     Blood culture #2 [[id number]] Collected: 02/23/22 0129    Lab Status: In process Specimen: Blood from Hand, Left Updated: 02/23/22 0137    Blood culture #1 [[id number]] Collected: 02/23/22 0129    Lab Status: In process Specimen: Blood from Arm, Left Updated: 02/23/22 0137                 XR chest portable    (Results Pending)         Procedures  Procedures      ED Course  ED Course as of 02/23/22 0458 Wed Feb 23, 2022 0225 INFLU A PCR(!): Positive               Identification of Seniors at Risk      Most Recent Value   (ISAR) Identification of Seniors at Risk    Before the illness or injury that brought you to the Emergency, did you need someone to help you on a regular basis? 1 Filed at: 02/23/2022 0116   In the last 24 hours, have you needed more help than usual? 0 Filed at: 02/23/2022 0116   Have you been hospitalized for one or more nights during the past 6 months? 1 Filed at: 02/23/2022 0116   In general, do you see well? 0 Filed at: 02/23/2022 0116   In general, do you have serious problems with your memory? 0 Filed at: 02/23/2022 0116   Do you take more than three different medications every day? 1 Filed at: 02/23/2022 0116   ISAR Score 3 Filed at: 02/23/2022 0116                    SBIRT 22yo+      Most Recent Value   SBIRT (25 yo +)    In order to provide better care to our patients, we are screening all of our patients for alcohol and drug use   Would it be okay to ask you these screening questions? No Filed at: 02/23/2022 0152                MDM  Number of Diagnoses or Management Options  Hypoxia  Influenza A  Diagnosis management comments: Patient is a 51-year-old female presenting from nursing home with concern of fever, cough, shortness of breath  There is a noted influenza outbreak at this nursing home  On exam patient with mild hypoxia in room air, improved with 3 liters nasal cannula to 95 percent  She has rhonchorous breath sounds bilaterally without tachypnea or respiratory distress  Otherwise well-appearing  Patient found to have influenza a  Will admit to a slim for influenza pneumonia causing hypoxia  Disposition  Final diagnoses:   Influenza A   Hypoxia     Time reflects when diagnosis was documented in both MDM as applicable and the Disposition within this note     Time User Action Codes Description Comment    2/23/2022  3:16 AM Vladimir De La Cruz Add [J10 1] Influenza A     2/23/2022  3:16 AM Vladimir De La Cruz Add [R09 02] Hypoxia       ED Disposition     ED Disposition Condition Date/Time Comment    Admit Stable Wed Feb 23, 2022  3:16 AM Case was discussed with Dr Junior Knapp and the patient's admission status was agreed to be Admission Status: observation status to the service of Dr Junior Knapp ([facility])   Follow-up Information    None         Patient's Medications   Discharge Prescriptions    No medications on file     No discharge procedures on file  PDMP Review       Value Time User    PDMP Reviewed  Yes 2/23/2022  3:29 AM Vinicio Arizmendi DO           ED Provider  Attending physically available and evaluated [facility]  I managed the patient along with the ED Attending      Electronically Signed by         Ken Sam MD  02/23/22 0107

## 2022-02-23 NOTE — ED ATTENDING ATTESTATION
2/23/2022  IJulia MD, saw and evaluated the patient  I have discussed the patient with the resident/non-physician practitioner and agree with the resident's/non-physician practitioner's findings, Plan of Care, and MDM as documented in the resident's/non-physician practitioner's note, except where noted  All available labs and Radiology studies were reviewed  I was present for key portions of any procedure(s) performed by the resident/non-physician practitioner and I was immediately available to provide assistance  At this point I agree with the current assessment done in the Emergency Department  I have conducted an independent evaluation of this patient a history and physical is as follows:    ED Course      Emergency Department Note- Kianna Leigh 80 y o  female MRN: [id number]    Unit/Bed#: ED 23 Encounter: [id number]    Kianna Leigh is a 80 y o  female who presents with   Chief Complaint   Patient presents with    Cough     pt comes from [facility] presents via [facility] for cough and desat         History of Present Illness   HPI:  Kianna Leigh is a 80 y o  female who presents for evaluation of:  Fever, cough, and difficulty breathing  Patient noted the onset of symptoms over the last 24 hours  Patient has dyspnea at rest, CAMPOS, and orthopnea  Patient lives in [place] and there is an outbreak of influenza there  Review of Systems   Constitutional: Positive for fatigue and fever  HENT: Positive for congestion and rhinorrhea  Respiratory: Positive for cough and shortness of breath  Cardiovascular: Negative for chest pain  Gastrointestinal: Positive for nausea  Negative for vomiting  Genitourinary: Negative for dysuria and frequency  All other systems reviewed and are negative        Historical Information   Past Medical History:   Diagnosis Date    Anxiety     Arthritis     OA    Asthma     good control    Burn     Chronic pain disorder     spinal stenosis    GERD (gastroesophageal reflux disease)     off and on    Glaucoma     Hiatal hernia     HLD (hyperlipidemia)     Hx of fall 2015    Hypertension     PVD [facility]peripheral vascular disease) ([facility] [address] [facility]     Thyroid disease     hypo     Past Surgical History:   Procedure Laterality Date    ANGIOPLASTY      jacquelyn lower legs    BLADDER SUSPENSION      with mesh    CATARACT EXTRACTION Bilateral     COLONOSCOPY      COLONOSCOPY W/ POLYPECTOMY      via Colostomy; Pt does not have colostomy - no listing for a polypectomy of colon per Allscripts    DILATION AND CURETTAGE OF UTERUS      NASAL POLYP EXCISION      Nasal Endoscopy Polypectomy    OTHER SURGICAL HISTORY  04/2007    Uterine prolapse and repair     FL REVISE MEDIAN N/CARPAL TUNNEL SURG Left 1/10/2019    Procedure: RELEASE LEFT CARPAL TUNNEL;  Surgeon: Stephanie Cruz MD;  Location: [facility] MAIN OR;  Service: Orthopedics     Social History   Social History     Substance and Sexual Activity   Alcohol Use Yes    Alcohol/week: 1 0 standard drink    Types: 1 Glasses of wine per week    Comment: rare     Social History     Substance and Sexual Activity   Drug Use No     Social History     Tobacco Use   Smoking Status Never Smoker   Smokeless Tobacco Never Used     Family History:   Family History   Problem Relation Age of Onset    Other Family         Back Pain     Hypertension Family     Thyroid disease Family         Disorder       Meds/Allergies   PTA meds:   Prior to Admission Medications   Prescriptions Last Dose Informant Patient Reported? Taking?    Alphagan P 0 1 %   Yes No   Biotin 5 MG CAPS   Yes No   Sig: Take by mouth   CALCIUM PO  Self Yes No   Sig: Take by mouth   Mirabegron ER (Myrbetriq) 25 MG TB24   Yes No   Sig: Take 25 mg by mouth daily   Omega-3 Fatty Acids (EQL OMEGA 3 FISH OIL) 1200 MG CAPS   Yes No   Sig: Take by mouth   Vitamin D High Potency 25 MCG (1000 UT) capsule   No No   Sig: GIVE 1 CAP BY MOUTH EVERY DAY DX: SUPPLEMENT   albuterol (Ventolin HFA) 90 mcg/act inhaler   No No Sig: Inhale 2 puffs 3 (three) times a day   amLODIPine (NORVASC) 5 mg tablet   No No   Sig: Take 1 tablet (5 mg total) by mouth daily   aspirin 81 MG tablet   Yes No   Sig: Take 81 mg by mouth daily     brimonidine tartrate 0 2 % ophthalmic solution   Yes No   Sig: Administer 1 drop to both eyes 2 (two) times a day     fluticasone (FLONASE) 50 mcg/act nasal spray   No No   Si SPRAYS IN EACH NOSTRIL EVERY DAY   fluticasone-salmeterol (ADVAIR, WIXELA) 250-50 mcg/dose inhaler   No No   Sig: Inhale 1 puff 2 (two) times a day Rinse mouth after use     furosemide (LASIX) 20 mg tablet   Yes No   gabapentin (NEURONTIN) 300 mg capsule   No No   Sig: Take 1 capsule at hs   latanoprost (XALATAN) 0 005 % ophthalmic solution   Yes No   Sig: Administer 1 drop to both eyes daily at bedtime     levothyroxine 50 mcg tablet   No No   Sig: Take 1 tablet (50 mcg total) by mouth daily   lisinopril (ZESTRIL) 20 mg tablet   No No   Sig: Take 1 tablet (20 mg total) by mouth daily   mometasone (ELOCON) 0 1 % cream   No No   Sig: Apply topically daily   oxyCODONE (ROXICODONE) 5 mg immediate release tablet   No No   Sig: Take 1 tablet (5 mg total) by mouth every 4 (four) hours as needed for moderate pain or severe painMax Daily Amount: 30 mg   torsemide (DEMADEX) 10 mg tablet   No No   Sig: TAKE 1 TABLET (10 MG TOTAL) BY MOUTH DAILY      Facility-Administered Medications: None     Allergies   Allergen Reactions    Cephalexin Nausea Only    Erythromycin     Phenazopyridine     Piroxicam     Sulfamethoxazole-Trimethoprim        Objective   First Vitals:   Blood Pressure: 149/74 (22)  Pulse: (!) 114 (22)  Temperature: 100 3 °F (37 9 °C) (22)  Temp Source: Oral (22)  Respirations: 16 (22)  SpO2: 92 % (22)    Current Vitals:   Blood Pressure: 149/74 (22)  Pulse: (!) 114 (22)  Temperature: 100 3 °F (37 9 °C) (22)  Temp Source: Oral (22 2312)  Respirations: 16 (22 0115)  SpO2: 92 % (22 0115)    No intake or output data in the 24 hours ending 22 0142    Invasive Devices  Report    Peripheral Intravenous Line            Peripheral IV 22 Right Forearm <1 day    Peripheral IV 22 Right Wrist <1 day                Physical Exam  Vitals and nursing note reviewed  Constitutional:       General: She is not in acute distress  Appearance: Normal appearance  She is well-developed  HENT:      Head: Normocephalic and atraumatic  Right Ear: External ear normal       Left Ear: External ear normal       Nose: Nose normal       Mouth/Throat:      Pharynx: No oropharyngeal exudate  Eyes:      Conjunctiva/sclera: Conjunctivae normal       Pupils: Pupils are equal, round, and reactive to light  Cardiovascular:      Rate and Rhythm: Tachycardia present  Rhythm irregular  Pulmonary:      Effort: Pulmonary effort is normal  No respiratory distress  Abdominal:      General: Abdomen is flat  There is no distension  Musculoskeletal:         General: No deformity  Normal range of motion  Cervical back: Normal range of motion and neck supple  Skin:     General: Skin is warm and dry  Capillary Refill: Capillary refill takes less than 2 seconds  Neurological:      General: No focal deficit present  Mental Status: She is alert and oriented to person, place, and time  Mental status is at baseline  Coordination: Coordination normal    Psychiatric:         Mood and Affect: Mood normal          Behavior: Behavior normal          Thought Content: Thought content normal          Judgment: Judgment normal            Medical Decision Makin  Acute fevers, hypoxia: septic w/u    No results found for this or any previous visit (from the past 36 hour(s))  XR chest portable    (Results Pending)         Portions of the record may have been created with voice recognition software   Occasional wrong word or "sound a like" substitutions may have occurred due to the inherent limitations of voice recognition software  Read the chart carefully and recognize, using context, where substitutions have occurred            Critical Care Time  Procedures

## 2022-02-23 NOTE — CONSULTS
Cardiology   [facility] 80 y o  female MRN: [id number]  Unit/Bed#: ED 21 Encounter: [id number]      Reason for Consult / Principal Problem:  New onset atrial fibrillation with RVR    Physician Requesting Consult:  Chris Rizo DO    Cardiologist:  Dr Brittany Larry  1  New onset atrial fibrillation with RVR - possibly provoked in the setting of influenza A   -12 lead ECG 2/23/22; Afib w/RVR, rate 123 bpm   -24 hour telemetry review; afib, rates currently 110's-120's  -Previously on no AV maksim agents  Started on oral metoprolol tartrate 25 mg b i d in the ED   -CHADS2 Vasc score = 6 (age, sex, HTN, HF, vasc)  2  Influenza A  -Management per the primary service  -Currently on Tamiflu   3  Chronic diastolic CHF  -TTE 7/1574; LVEF 68%  -On oral torsemide 10 mg daily  4  Dyslipidemia  -Lipid profile 3/2020; cholesterol 267, triglycerides 46, HDL 98, and   -Previously on no statin  5  Essential hypertension  -BP last recorded 164/90   -Outpatient BP regimen; amlodipine 5 mg daily, lisinopril 20 mg daily, and torsemide 10 mg daily  6  PAD w/intermittent LE claudication  -Follows  vascular surgery as an outpatient   -On aspirin 81 mg daily, previously on no statin  Plan  -See detailed plan above as per Dr Rajwinder Navas  HPI: [facility] 80y o  year old female with a medical history of chronic diastolic heart failure, essential hypertension, dyslipidemia, PAD w/intermittent LE claudication, hypothyroidism, and asthma  She is a lifelong nonsmoker, denies excessive alcohol or recreational drug use  She follows with Dr Lakisha Sherwood w/[facility]  Presented to the [facility] on 2/23/2022 with complaints of increasing shortness of breath, cough, and fever  In the ED she was noted to have been hypoxic on RA and was placed on 4 L of supplemental O2 maintaining saturations in the low to mid 90s    She subsequently tested positive for influenza A and was initiated on a course of Tamiflu by the primary service  She had been placed on cardiac monitoring which demonstrated in a regular tachycardia heart rhythm  A 12 lead ECG was obtained which demonstrated atrial fibrillation with RVR with rates in the 120s  She had been initiated on metoprolol tartrate 25 mg b i d , and was initiated on therapeutic Lovenox for systemic anticoagulation  Further workup ED  Hemodynamics on admission  -Temp 100 3° F, , R 16, /74, sat 92% on 4 L NC  Laboratory data on admission  -NA +136, K +4 0, chloride 102, CO2 27, anion gap 7, BUN 22, creatinine 0 77, glucose 159, calcium 9 5, normal LFTs, albumin 3 4, WBC 0 2, HGB 11 6, platelet count 749    -Lactic acid 1 6  -Procalcitonin level 0 11  -TSH level 1 4  Imaging  -Chest x-ray personally reviewed - lungs are clear, no pneumothorax or pleural effusion  Cardiology was consulted for further treatment recommendations/management in regards to new onset atrial fibrillation        Family History:   Family History   Problem Relation Age of Onset    Other Family         Back Pain     Hypertension Family     Thyroid disease Family         Disorder     Historical Information   Past Medical History:   Diagnosis Date    Anxiety     Arthritis     OA    Asthma     good control    Burn     Chronic pain disorder     spinal stenosis    GERD (gastroesophageal reflux disease)     off and on    Glaucoma     Hiatal hernia     HLD (hyperlipidemia)     Hx of fall 2015    Hypertension     PVD [facility]peripheral vascular disease) ([facility] [address] )     Thyroid disease     hypo     Past Surgical History:   Procedure Laterality Date    ANGIOPLASTY      jacquelyn lower legs    BLADDER SUSPENSION      with mesh    CATARACT EXTRACTION Bilateral     COLONOSCOPY      COLONOSCOPY W/ POLYPECTOMY      via Colostomy; Pt does not have colostomy - no listing for a polypectomy of colon per Allscripts    DILATION AND CURETTAGE OF UTERUS      NASAL POLYP EXCISION      Nasal Endoscopy Polypectomy  OTHER SURGICAL HISTORY  04/2007    Uterine prolapse and repair     CA REVISE MEDIAN N/CARPAL TUNNEL SURG Left 1/10/2019    Procedure: RELEASE LEFT CARPAL TUNNEL;  Surgeon: Karlene Calderon MD;  Location: [address] [place] [address];  Service: Orthopedics     Social History   Social History     Substance and Sexual Activity   Alcohol Use Yes    Alcohol/week: 1 0 standard drink    Types: 1 Glasses of wine per week    Comment: rare     Social History     Substance and Sexual Activity   Drug Use No     Social History     Tobacco Use   Smoking Status Never Smoker   Smokeless Tobacco Never Used     Family History:   Family History   Problem Relation Age of Onset    Other Family         Back Pain     Hypertension Family     Thyroid disease Family         Disorder       Review of Systems:  Review of Systems  Please see ROS by Dr Ghazala Sanders above      Scheduled Meds:  Current Facility-Administered Medications   Medication Dose Route Frequency Provider Last Rate    acetaminophen  650 mg Oral Q6H PRN Love Spoon, DO      amLODIPine  5 mg Oral Daily Love Spoon, DO      aspirin  81 mg Oral Daily Love Spoon, DO      benzonatate  100 mg Oral TID PRN Love Spoon, DO      brimonidine tartrate  1 drop Both Eyes BID Love Spoon, DO      calcium carbonate  1 tablet Oral Daily With Breakfast Love Spoon, DO      cholecalciferol  1,000 Units Oral Daily Love Spoon, DO      enoxaparin  1 mg/kg Subcutaneous Q12H [facility] Love Spoon, DO      fluticasone  2 spray Each Nare Daily Love Spoon, DO      fluticasone-vilanterol  1 puff Inhalation Daily Love Spoon, DO      gabapentin  300 mg Oral HS Love Spoon, DO      latanoprost  1 drop Both Eyes HS Love Spoon, DO      levalbuterol  1 25 mg Nebulization TID Love Spoon, DO      And    sodium chloride  3 mL Nebulization TID Love Spoon, DO      levalbuterol  1 25 mg Nebulization Q6H PRN Love Spoon, DO      And    sodium chloride  3 mL Nebulization Q6H PRN Dominic Pietro, DO      levothyroxine  50 mcg Oral Early Morning Dominic Pietro, DO      lisinopril  20 mg Oral Daily Dominic Pietro, DO      metoprolol tartrate  25 mg Oral Q12H [address] Lynn Joel PA-C      ondansetron  4 mg Intravenous Q6H PRN Dominic Pietro, DO      oseltamivir  30 mg Oral Q12H [address] Dominic Pietro, DO      oxybutynin  5 mg Oral Daily Dominic Pietro, DO      sodium chloride (PF)  3 mL Intravenous Q12H [address] Dominic Pietro, DO      torsemide  10 mg Oral Daily Dominic Pietro, DO       Continuous Infusions:   PRN Meds:   acetaminophen    benzonatate    levalbuterol **AND** sodium chloride    ondansetron  all current active meds have been reviewed and current meds:   Current Facility-Administered Medications   Medication Dose Route Frequency    acetaminophen (TYLENOL) tablet 650 mg  650 mg Oral Q6H PRN    amLODIPine (NORVASC) tablet 5 mg  5 mg Oral Daily    aspirin (ECOTRIN LOW STRENGTH) EC tablet 81 mg  81 mg Oral Daily    benzonatate (TESSALON PERLES) capsule 100 mg  100 mg Oral TID PRN    brimonidine tartrate 0 2 % ophthalmic solution 1 drop  1 drop Both Eyes BID    calcium carbonate (OYSTER SHELL,OSCAL) 500 mg tablet 1 tablet  1 tablet Oral Daily With Breakfast    cholecalciferol (VITAMIN D3) tablet 1,000 Units  1,000 Units Oral Daily    enoxaparin (LOVENOX) subcutaneous injection 60 mg  1 mg/kg Subcutaneous Q12H [facility]    fluticasone (FLONASE) 50 mcg/act nasal spray 2 spray  2 spray Each Nare Daily    fluticasone-vilanterol (BREO ELLIPTA) 200-25 MCG/INH inhaler 1 puff  1 puff Inhalation Daily    gabapentin (NEURONTIN) capsule 300 mg  300 mg Oral HS    latanoprost (XALATAN) 0 005 % ophthalmic solution 1 drop  1 drop Both Eyes HS    levalbuterol (XOPENEX) inhalation solution 1 25 mg  1 25 mg Nebulization TID    And    sodium chloride 0 9 % inhalation solution 3 mL  3 mL Nebulization TID    levalbuterol (XOPENEX) inhalation solution 1 25 mg  1 25 mg Nebulization Q6H PRN And    sodium chloride 0 9 % inhalation solution 3 mL  3 mL Nebulization Q6H PRN    levothyroxine tablet 50 mcg  50 mcg Oral Early Morning    lisinopril (ZESTRIL) tablet 20 mg  20 mg Oral Daily    metoprolol tartrate (LOPRESSOR) tablet 25 mg  25 mg Oral Q12H [facility]    ondansetron (ZOFRAN) injection 4 mg  4 mg Intravenous Q6H PRN    oseltamivir (TAMIFLU) capsule 30 mg  30 mg Oral Q12H [facility]    oxybutynin (DITROPAN-XL) 24 hr tablet 5 mg  5 mg Oral Daily    sodium chloride (PF) 0 9 % injection 3 mL  3 mL Intravenous Q12H [facility]    torsemide (DEMADEX) tablet 10 mg  10 mg Oral Daily       Allergies   Allergen Reactions    Cephalexin Nausea Only    Erythromycin     Phenazopyridine     Piroxicam     Sulfamethoxazole-Trimethoprim        Objective   Vitals: Blood pressure 164/90, pulse (!) 106, temperature 100 3 °F (37 9 °C), temperature source Oral, resp  rate (!) 23, SpO2 98 %, not currently breastfeeding  , There is no height or weight on file to calculate BMI ,   Orthostatic Blood Pressures      Most Recent Value   Blood Pressure 164/90 filed at 02/23/2022 0907          No intake or output data in the 24 hours ending 02/23/22 1045    Invasive Devices  Report    Peripheral Intravenous Line            Peripheral IV 02/23/22 Right Forearm <1 day    Peripheral IV 02/23/22 Right Wrist <1 day              Physical Exam:  Physical Exam   Please see detailed physical exam by Dr Allan Darling above    Lab Results:   Recent Results (from the past 24 hour(s))   CBC and differential    Collection Time: 02/23/22  1:29 AM   Result Value Ref Range    WBC 10 23 (H) 4 31 - 10 16 Thousand/uL    RBC 4 10 3 81 - 5 12 Million/uL    Hemoglobin 11 6 11 5 - 15 4 g/dL    Hematocrit 35 5 34 8 - 46 1 %    MCV 87 82 - 98 fL    MCH 28 3 26 8 - 34 3 pg    MCHC 32 7 31 4 - 37 4 g/dL    RDW 14 6 11 6 - 15 1 %    MPV 12 4 8 9 - 12 7 fL    Platelets 648 280 - 466 Thousands/uL    nRBC 0 /100 WBCs    Neutrophils Relative 88 (H) 43 - 75 %    Immat GRANS % 0 0 - 2 %    Lymphocytes Relative 7 (L) 14 - 44 %    Monocytes Relative 5 4 - 12 %    Eosinophils Relative 0 0 - 6 %    Basophils Relative 0 0 - 1 %    Neutrophils Absolute 8 83 (H) 1 85 - 7 62 Thousands/µL    Immature Grans Absolute 0 03 0 00 - 0 20 Thousand/uL    Lymphocytes Absolute 0 76 0 60 - 4 47 Thousands/µL    Monocytes Absolute 0 54 0 17 - 1 22 Thousand/µL    Eosinophils Absolute 0 04 0 00 - 0 61 Thousand/µL    Basophils Absolute 0 03 0 00 - 0 10 Thousands/µL   Comprehensive metabolic panel    Collection Time: 02/23/22  1:29 AM   Result Value Ref Range    Sodium 136 136 - 145 mmol/L    Potassium 4 0 3 5 - 5 3 mmol/L    Chloride 102 100 - 108 mmol/L    CO2 27 21 - 32 mmol/L    ANION GAP 7 4 - 13 mmol/L    BUN 22 5 - 25 mg/dL    Creatinine 0 77 0 60 - 1 30 mg/dL    Glucose 159 (H) 65 - 140 mg/dL    Calcium 9 5 8 3 - 10 1 mg/dL    Corrected Calcium 10 0 8 3 - 10 1 mg/dL    AST 27 5 - 45 U/L    ALT 27 12 - 78 U/L    Alkaline Phosphatase 99 46 - 116 U/L    Total Protein 8 0 6 4 - 8 2 g/dL    Albumin 3 4 (L) 3 5 - 5 0 g/dL    Total Bilirubin 0 53 0 20 - 1 00 mg/dL    eGFR 67 ml/min/1 73sq m   Lactic acid    Collection Time: 02/23/22  1:29 AM   Result Value Ref Range    LACTIC ACID 1 6 0 5 - 2 0 mmol/L   Procalcitonin with AM Reflex    Collection Time: 02/23/22  1:29 AM   Result Value Ref Range    Procalcitonin 0 11 <=0 25 ng/ml   Protime-INR    Collection Time: 02/23/22  1:29 AM   Result Value Ref Range    Protime 13 5 11 6 - 14 5 seconds    INR 1 07 0 84 - 1 19   APTT    Collection Time: 02/23/22  1:29 AM   Result Value Ref Range    PTT 29 23 - 37 seconds   Blood culture #1    Collection Time: 02/23/22  1:29 AM    Specimen: Arm, Left; Blood   Result Value Ref Range    Blood Culture Received in Microbiology Lab  Culture in Progress  Blood culture #2    Collection Time: 02/23/22  1:29 AM    Specimen: Hand, Left; Blood   Result Value Ref Range    Blood Culture Received in Microbiology Lab  Culture in Progress  COVID/FLU/RSV - 2 hour TAT    Collection Time: 02/23/22  1:29 AM    Specimen: Nose; Nares   Result Value Ref Range    SARS-CoV-2 Negative Negative    INFLUENZA A PCR Positive (A) Negative    INFLUENZA B PCR Negative Negative    RSV PCR Negative Negative   TSH, 3rd generation    Collection Time: 02/23/22  1:29 AM   Result Value Ref Range    TSH 3RD GENERATON 1 440 0 358 - 3 740 uIU/mL   ECG 12 lead    Collection Time: 02/23/22  1:42 AM   Result Value Ref Range    Ventricular Rate 123 BPM    Atrial Rate 78 BPM    LA Interval  ms    QRSD Interval 68 ms    QT Interval 308 ms    QTC Interval 440 ms    P Axis 255 degrees    QRS Axis 66 degrees    T Wave Axis -14 degrees     Imaging: I have personally reviewed pertinent reports  and I have personally reviewed pertinent films in PACS    Code Status:  Level 1 full code    Epic/ Allscripts/Care Everywhere records reviewed: Yes    * Please Note: Fluency DirectDictation voice to text software may have been used in the creation of this document   **

## 2022-02-23 NOTE — ASSESSMENT & PLAN NOTE
· Present with fever, cough, shortness for breath  Requiring 3 L NC on admission to maintain appropriate saturations  · Found positive for influenza A, COVID-19 negative    Procalcitonin WNL  · Start Tamiflu  · Supportive care with p r n  nebulizers  · Continue supplemental oxygen, titrate as needed to maintain SaO2 greater than 88%  · Respiratory protocol, incentive spirometry, pulmonary toileting

## 2022-02-23 NOTE — ASSESSMENT & PLAN NOTE
· BP acceptable, monitor routinely   · Continue home dose Norvasc and lisinopril   · Start metoprolol for rate control as above   · Appreciate cardiology inputs

## 2022-02-23 NOTE — ASSESSMENT & PLAN NOTE
· Present with fever, cough, shortness for breath  Requiring 3 L NC on admission to maintain appropriate saturations  · Found positive for influenza A, COVID-19 negative    Procalcitonin WNL  · Continue with Tamiflu  · Supportive care with p r n  nebulizers  · Respiratory protocol, incentive spirometry, pulmonary toileting

## 2022-02-23 NOTE — ASSESSMENT & PLAN NOTE
· Wheezing noted on admission, suspect triggered by influenza A infection  · Received one time dose 40 mg IV Solu-Medrol and nebulizers in ED with improvement   · Continue with home inhalers/nebulizers   · Respiratory protocol

## 2022-02-24 LAB
ANION GAP SERPL CALCULATED.3IONS-SCNC: 5 MMOL/L (ref 4–13)
BUN SERPL-MCNC: 36 MG/DL (ref 5–25)
CALCIUM SERPL-MCNC: 8.9 MG/DL (ref 8.3–10.1)
CHLORIDE SERPL-SCNC: 104 MMOL/L (ref 100–108)
CO2 SERPL-SCNC: 26 MMOL/L (ref 21–32)
CREAT SERPL-MCNC: 1 MG/DL (ref 0.6–1.3)
GFR SERPL CREATININE-BSD FRML MDRD: 49 ML/MIN/1.73SQ M
GLUCOSE SERPL-MCNC: 160 MG/DL (ref 65–140)
MAGNESIUM SERPL-MCNC: 2.1 MG/DL (ref 1.6–2.6)
POTASSIUM SERPL-SCNC: 4.4 MMOL/L (ref 3.5–5.3)
SODIUM SERPL-SCNC: 135 MMOL/L (ref 136–145)

## 2022-02-24 PROCEDURE — 94640 AIRWAY INHALATION TREATMENT: CPT

## 2022-02-24 PROCEDURE — 80048 BASIC METABOLIC PNL TOTAL CA: CPT | Performed by: STUDENT IN AN ORGANIZED HEALTH CARE EDUCATION/TRAINING PROGRAM

## 2022-02-24 PROCEDURE — 94760 N-INVAS EAR/PLS OXIMETRY 1: CPT

## 2022-02-24 PROCEDURE — 99233 SBSQ HOSP IP/OBS HIGH 50: CPT | Performed by: INTERNAL MEDICINE

## 2022-02-24 PROCEDURE — 99232 SBSQ HOSP IP/OBS MODERATE 35: CPT | Performed by: INTERNAL MEDICINE

## 2022-02-24 PROCEDURE — 97167 OT EVAL HIGH COMPLEX 60 MIN: CPT

## 2022-02-24 PROCEDURE — 94664 DEMO&/EVAL PT USE INHALER: CPT

## 2022-02-24 PROCEDURE — 97163 PT EVAL HIGH COMPLEX 45 MIN: CPT

## 2022-02-24 PROCEDURE — 83735 ASSAY OF MAGNESIUM: CPT | Performed by: STUDENT IN AN ORGANIZED HEALTH CARE EDUCATION/TRAINING PROGRAM

## 2022-02-24 RX ORDER — METOPROLOL TARTRATE 5 MG/5ML
5 INJECTION INTRAVENOUS EVERY 8 HOURS PRN
Status: DISCONTINUED | OUTPATIENT
Start: 2022-02-24 | End: 2022-02-27 | Stop reason: HOSPADM

## 2022-02-24 RX ORDER — METOPROLOL TARTRATE 50 MG/1
50 TABLET, FILM COATED ORAL EVERY 12 HOURS SCHEDULED
Status: DISCONTINUED | OUTPATIENT
Start: 2022-02-24 | End: 2022-02-24

## 2022-02-24 RX ADMIN — Medication 1000 UNITS: at 09:04

## 2022-02-24 RX ADMIN — LISINOPRIL 20 MG: 20 TABLET ORAL at 09:04

## 2022-02-24 RX ADMIN — ISODIUM CHLORIDE 3 ML: 0.03 SOLUTION RESPIRATORY (INHALATION) at 13:11

## 2022-02-24 RX ADMIN — FLUTICASONE FUROATE AND VILANTEROL TRIFENATATE 1 PUFF: 200; 25 POWDER RESPIRATORY (INHALATION) at 11:39

## 2022-02-24 RX ADMIN — GABAPENTIN 300 MG: 300 CAPSULE ORAL at 22:04

## 2022-02-24 RX ADMIN — ASPIRIN 81 MG: 81 TABLET, COATED ORAL at 09:03

## 2022-02-24 RX ADMIN — FLUTICASONE PROPIONATE 2 SPRAY: 50 SPRAY, METERED NASAL at 11:39

## 2022-02-24 RX ADMIN — METOPROLOL TARTRATE 75 MG: 50 TABLET, FILM COATED ORAL at 22:04

## 2022-02-24 RX ADMIN — METOPROLOL TARTRATE 25 MG: 25 TABLET, FILM COATED ORAL at 17:25

## 2022-02-24 RX ADMIN — OSELTAMIVIR PHOSPHATE 30 MG: 30 CAPSULE ORAL at 09:05

## 2022-02-24 RX ADMIN — ENOXAPARIN SODIUM 60 MG: 60 INJECTION SUBCUTANEOUS at 22:05

## 2022-02-24 RX ADMIN — TORSEMIDE 10 MG: 20 TABLET ORAL at 09:02

## 2022-02-24 RX ADMIN — BRIMONIDINE TARTRATE 1 DROP: 2 SOLUTION OPHTHALMIC at 09:09

## 2022-02-24 RX ADMIN — LEVALBUTEROL HYDROCHLORIDE 1.25 MG: 1.25 SOLUTION, CONCENTRATE RESPIRATORY (INHALATION) at 13:11

## 2022-02-24 RX ADMIN — ISODIUM CHLORIDE 3 ML: 0.03 SOLUTION RESPIRATORY (INHALATION) at 19:21

## 2022-02-24 RX ADMIN — BRIMONIDINE TARTRATE 1 DROP: 2 SOLUTION OPHTHALMIC at 17:26

## 2022-02-24 RX ADMIN — CALCIUM 1 TABLET: 500 TABLET ORAL at 09:04

## 2022-02-24 RX ADMIN — METOPROLOL TARTRATE 50 MG: 50 TABLET, FILM COATED ORAL at 09:02

## 2022-02-24 RX ADMIN — OXYBUTYNIN CHLORIDE 5 MG: 5 TABLET, EXTENDED RELEASE ORAL at 09:03

## 2022-02-24 RX ADMIN — ISODIUM CHLORIDE 3 ML: 0.03 SOLUTION RESPIRATORY (INHALATION) at 07:07

## 2022-02-24 RX ADMIN — AMLODIPINE BESYLATE 5 MG: 5 TABLET ORAL at 09:03

## 2022-02-24 RX ADMIN — SODIUM CHLORIDE 3 ML: 9 INJECTION, SOLUTION INTRAMUSCULAR; INTRAVENOUS; SUBCUTANEOUS at 09:07

## 2022-02-24 RX ADMIN — ENOXAPARIN SODIUM 60 MG: 60 INJECTION SUBCUTANEOUS at 09:06

## 2022-02-24 RX ADMIN — LATANOPROST 1 DROP: 50 SOLUTION OPHTHALMIC at 22:04

## 2022-02-24 RX ADMIN — OSELTAMIVIR PHOSPHATE 30 MG: 30 CAPSULE ORAL at 22:06

## 2022-02-24 RX ADMIN — LEVALBUTEROL HYDROCHLORIDE 1.25 MG: 1.25 SOLUTION, CONCENTRATE RESPIRATORY (INHALATION) at 19:21

## 2022-02-24 RX ADMIN — LEVOTHYROXINE SODIUM 50 MCG: 50 TABLET ORAL at 09:04

## 2022-02-24 RX ADMIN — LEVALBUTEROL HYDROCHLORIDE 1.25 MG: 1.25 SOLUTION, CONCENTRATE RESPIRATORY (INHALATION) at 07:06

## 2022-02-24 NOTE — ASSESSMENT & PLAN NOTE
-Monitor for changes in BP  -Continue home blood pressure medications amlodipine, lisinopril, and torsemide  -Metoprolol increased  -Appreciate cardiology recommendations

## 2022-02-24 NOTE — ASSESSMENT & PLAN NOTE
Pt had wheezing on admission in the setting of Influenza A infection   She was treated with 40 mg IV Solu-medrol and nebulizer with improvement    -Continue with home inhalers/nebulizers    -Respiratory protocol

## 2022-02-24 NOTE — PHYSICAL THERAPY NOTE
Physical Therapy Evaluation     Patient's Name: Emily Walton    Admitting Diagnosis  Cough [R05 9]  Influenza A [J10 1]  Hypoxia [R09 02]  New onset atrial fibrillation ([address] ) [I48 91]    Problem List  Patient Active Problem List   Diagnosis    Anemia    Asthma    Atherosclerosis of native artery of both lower extremities with intermittent claudication ([facility])    Carotid atherosclerosis    Complete tear of right rotator cuff    Disc degeneration, lumbar    GERD without esophagitis    Glaucoma    Hyperlipidemia    Hypertension    Hypothyroidism    Lumbar canal stenosis    Peripheral neuropathy    Spondylosis of lumbar region without myelopathy or radiculopathy    Incomplete tear of left rotator cuff    Trigger little finger of left hand    Slow transit constipation    Carpal tunnel syndrome of left wrist    Carpal tunnel syndrome, left    Preop cardiovascular exam    Cellulitis of toe of right foot    Age-related osteoporosis without current pathological fracture    Lumbar back pain    Trigger ring finger of left hand    Trigger finger, right ring finger    Trigger finger, left little finger    Shortness of breath    Primary osteoarthritis of right knee    Mixed stress and urge urinary incontinence    Hiatal hernia    Dermatitis    Urge incontinence of urine    Lower extremity edema    Preop examination    Nasal polyposis    Right facial swelling    Chronic diastolic CHF (congestive heart failure) ([facility])    Continuous opioid dependence ([address] )    Influenza A with respiratory manifestations    New onset atrial fibrillation ([address] )    Atrial fibrillation ([address] )    Acute respiratory failure with hypoxia ([address] )       Past Medical History  Past Medical History:   Diagnosis Date    Anxiety     Arthritis     OA    Asthma     good control    Burn     Chronic pain disorder     spinal stenosis    GERD (gastroesophageal reflux disease)     off and on    Glaucoma     Hiatal hernia     HLD (hyperlipidemia)     Hx of fall 2015    Hypertension     P[facility]peripheral vascular disease) [facility]     Thyroid disease     hypo       Past Surgical History  Past Surgical History:   Procedure Laterality Date    ANGIOPLASTY      jacquelyn lower legs    BLADDER SUSPENSION      with mesh    CATARACT EXTRACTION Bilateral     COLONOSCOPY      COLONOSCOPY W/ POLYPECTOMY      via Colostomy; Pt does not have colostomy - no listing for a polypectomy of colon per [employer]    DILATION AND CURETTAGE OF UTERUS      NASAL POLYP EXCISION      Nasal Endoscopy Polypectomy    OTHER SURGICAL HISTORY  04/2007    Uterine prolapse and repair     KS REVISE MEDIAN N/CARPAL TUNNEL SURG Left 1/10/2019    Procedure: RELEASE LEFT CARPAL TUNNEL;  Surgeon: Lynn Parker MD;  Location: [facility] MAIN OR;  Service: Orthopedics          02/24/22 0835   PT Last Visit   PT Visit Date 02/24/22   Note Type   Note type Evaluation   Pain Assessment   Pain Assessment Tool 0-10   Pain Score No Pain   Restrictions/Precautions   Weight Bearing Precautions Per Order No   Other Precautions Chair Alarm; Bed Alarm;Telemetry;O2;Fall Risk  (2L O2)   Home Living   Type of Home Assisted living  ([facility])   Home Layout One level   Bathroom Shower/Tub Tub/shower unit   Bathroom Toilet Raised   Home Equipment Walker   Additional Comments Pt resides at [facility]   Pt was ambulating w/ RW PTA   Prior Function   Level of [place] Independent with ADLs and functional mobility   Lives With Facility staff   Receives Help From Personal care attendant   ADL Assistance Independent   IADLs Needs assistance   Falls in the last 6 months 1 to 4  (4, per pt)   Comments Pt reports she is able to dress and bathe herself and [facility] provides meals, IADLs   General   Family/Caregiver Present No   Cognition   Arousal/Participation Alert   Orientation Level Oriented X4   Following Commands Follows one step commands without difficulty   Comments Pt very pleasant and cooperative Subjective   Subjective Pt lying supine and agreeable to PT evaluation   RLE Assessment   RLE Assessment WFL   LLE Assessment   LLE Assessment WFL   Coordination   Movements are Fluid and Coordinated 1   Bed Mobility   Supine to Sit 5  Supervision   Additional items HOB elevated; Increased time required   Sit to Supine Unable to assess   Additional Comments Pt lying supine upon PT arrival  Pt returned seated OOB at end of session w/ all needs within reach and chair alarm activated   Transfers   Sit to Stand 5  Supervision   Additional items Verbal cues   Stand to Sit 5  Supervision   Additional items Verbal cues   Toilet transfer 4  Minimal assistance   Additional items Assist x 1;Standard toilet   Additional Comments Transfers w/ RW   Ambulation/Elevation   Gait pattern Excessively slow; Short stride; Inconsistent james   Gait Assistance 4  Minimal assist   Additional items Assist x 1;Verbal cues   Assistive Device Rolling walker   Distance 10ft + 10ft   Balance   Static Sitting Fair +   Dynamic Sitting Fair   Static Standing Fair -   Dynamic Standing Fair -   Ambulatory Poor +   Endurance Deficit   Endurance Deficit Yes   Endurance Deficit Description weakness, fatigue   Activity Tolerance   Activity Tolerance Patient limited by fatigue   Medical Staff Made Aware OT Laly; Co-evaluation performed w/ OT due to pt's multiple co-morbidities, clinically unstable presentation, and regression in functional impairments as compared to baseline  PT focused on transfers, mobility, and LE assessment   Nurse Made Aware yes   Assessment   Prognosis Good   Problem List Decreased strength;Decreased endurance; Impaired balance;Decreased mobility   Assessment Pt is 80 y o  female seen for PT evaluation s/p admit to [facility] on 2/23/2022 w/ Influenza A with respiratory manifestations  PT consulted to assess pt's functional mobility and d/c needs  Order placed for PT eval and tx, w/ up w/ A order   Comorbidities affecting pt's physical performance at time of assessment include: HTN, CHF, atrial fibrillation, anemia, glaucoma, lumbar stenosis  PTA, pt was residing at [facility]  Pt was ambulating w/ RW and IND w/ ADLs  Personal factors affecting pt at time of IE include: ambulating w/ assistive device, inability to navigate community distances, unable to perform dynamic tasks in community, positive fall history and inability to perform IADLs  Please find objective findings from PT assessment regarding body systems outlined above with impairments and limitations including weakness, impaired balance, decreased endurance, gait deviations, decreased activity tolerance, decreased functional mobility tolerance and fall risk  Pt performed bed mobility at supervision, STS at supervision, and ambulated 10ft x2 w/ RW at [address] [place] [address]  The following objective measures performed on IE also reveal limitations: AM-PAC 6-Clicks: 12/72  Pt's clinical presentation is currently unstable/unpredictable seen in pt's presentation of recent admission for influenza A requiring medical attention, recent decline in function as compared to baseline, fall risk, O2 dependent as compared to baseline  Pt to benefit from continued PT tx to address deficits as defined above and maximize level of functional independent mobility and consistency  From PT/mobility standpoint, recommendation at time of d/c would be home with home health rehabilitation pending progress in order to facilitate return to OF  Barriers to Discharge None   Goals   Patient Goals to return home   STG Expiration Date 03/10/22   Short Term Goal #1 1  Pt will demonstrate the ability to perform all aspects of bed mobility at Mod I in onder to maximize functional independence and decrease burden on caregivers  2 Pt will demonstrate the ability to perform all functional transfers at Mod I in order to maximize functional independence and decrease burden on caregivers   3 Pt will demonstrate the ability to ambulate at least 150ft with least restrictive device at Mod I in order to maximize functional independence  4 Pt will demonstrate improved balance by one grade in order to decrease risk of falls  5 Pt will increase b/l Le strength by 1 grade in order to increase ease of functional mobility and transfers   PT Treatment Day 0   Plan   Treatment/Interventions Functional transfer training;LE strengthening/ROM; Therapeutic exercise; Endurance training;Patient/family training;Equipment eval/education; Bed mobility;Gait training;Spoke to nursing   PT Frequency 3-5x/wk   Recommendation   PT Discharge Recommendation Home with home health rehabilitation  (return to [facility] w/ PT services)   AM-PAC Basic Mobility Inpatient   Turning in Bed Without Bedrails 3   Lying on Back to Sitting on Edge of Flat Bed 3   Moving Bed to Chair 3   Standing Up From Chair 3   Walk in Room 3   Climb 3-5 Stairs 2   Basic Mobility Inpatient Raw Score 17   Basic Mobility Standardized Score 39 67   Highest Level Of Mobility   JH-HLM Goal 5: Stand one or more mins   JH-HLM Highest Level of Mobility 6: Walk 10 steps or more   JH-HLM Goal Achieved Yes   Modified [place] Scale   Modified [place]issett Scale 4     The patient's AM-PAC Basic Mobility Inpatient Short Form Raw Score is 17  A Raw score of greater than 16 suggests the patient may benefit from discharge to home  Please also refer to the recommendation of the Physical Therapist for safe discharge planning    Laura Herrera, PT, DPT

## 2022-02-24 NOTE — PROGRESS NOTES
Cardiology Progress Note - Gopi Lee 80 y o  female MRN: [id number]    Unit/Bed#: [facility] 709-01 Encounter: [id number]      Assessment:  1  Atrial fibrillation - presumably new onset  2  Acute respiratory failure with hypoxia secondary to #3  3  Influenza A  4  Chronic diastolic heart failure  5  Benign essential hypertension  6  Dyslipidemia  7  History of asthma  8  PAD with intermittent lower extremity claudication    Plan:  1  Heart rates remain uncontrolled   2  Metoprolol increased to 50 mg BID  3  On therapeutic Lovenox - SLIM to investigate cost of Eliquis  4  Blood pressure running high - beta-blocker increased  5  Volume status stable - continue oral diuretics  6  Echo noted     Subjective:   Patient seen and examined  No significant events overnight  Objective:     Vitals: Blood pressure 142/79, pulse (!) 132, temperature 97 6 °F (36 4 °C), resp   rate 20, height 4' 9 5" (1 461 m), weight 58 5 kg (129 lb), SpO2 95 %, not currently breastfeeding , Body mass index is 27 43 kg/m² ,   Orthostatic Blood Pressures      Most Recent Value   Blood Pressure 142/79 filed at 02/24/2022 2450   Patient Position - Orthostatic VS Lying filed at 02/23/2022 1030            Intake/Output Summary (Last 24 hours) at 2/24/2022 8336  Last data filed at 2/23/2022 1700  Gross per 24 hour   Intake 363 ml   Output --   Net 363 ml         Physical Exam:    GEN: Gopi Lee appears well, alert and oriented x 3, pleasant and cooperative   HEENT: pupils equal, round, and reactive to light; extraocular muscles intact  NECK: supple, no carotid bruits   HEART: irregularly irregular and tachycardic, variable S1/S2, no murmur   LUNGS: coarse breath sounds with scattered rhonchi  ABDOMEN: normal bowel sounds, soft, no tenderness, no distention  EXTREMITIES: peripheral pulses normal; no clubbing, cyanosis, or edema  NEURO: no focal findings   SKIN: normal without suspicious lesions on exposed skin    Medications:      Current Facility-Administered Medications:     acetaminophen (TYLENOL) tablet 650 mg, 650 mg, Oral, Q6H PRN, Toula Slight, DO    amLODIPine (NORVASC) tablet 5 mg, 5 mg, Oral, Daily, Toula Slight, DO, 5 mg at 02/24/22 0903    aspirin (ECOTRIN LOW STRENGTH) EC tablet 81 mg, 81 mg, Oral, Daily, Toula Slight, DO, 81 mg at 02/24/22 9299    benzonatate (TESSALON PERLES) capsule 100 mg, 100 mg, Oral, TID PRN, Toula Slight, DO    brimonidine tartrate 0 2 % ophthalmic solution 1 drop, 1 drop, Both Eyes, BID, Toula Slight, DO, 1 drop at 02/24/22 0909    calcium carbonate (OYSTER SHELL,OSCAL) 500 mg tablet 1 tablet, 1 tablet, Oral, Daily With Breakfast, Toula Slight, DO, 1 tablet at 02/24/22 0904    cholecalciferol (VITAMIN D3) tablet 1,000 Units, 1,000 Units, Oral, Daily, Toula Slight, DO, 1,000 Units at 02/24/22 0904    enoxaparin (LOVENOX) subcutaneous injection 60 mg, 1 mg/kg, Subcutaneous, Q12H [facility], Toula Slight, DO, 60 mg at 02/24/22 0906    fluticasone (FLONASE) 50 mcg/act nasal spray 2 spray, 2 spray, Each Nare, Daily, Toula Slight, DO    fluticasone-vilanterol (BREO ELLIPTA) 200-25 MCG/INH inhaler 1 puff, 1 puff, Inhalation, Daily, Toula Slight, DO    gabapentin (NEURONTIN) capsule 300 mg, 300 mg, Oral, HS, Toula Slight, DO, 300 mg at 02/23/22 2150    latanoprost (XALATAN) 0 005 % ophthalmic solution 1 drop, 1 drop, Both Eyes, HS, Toula Slight, DO, 1 drop at 02/23/22 2220    levalbuterol (XOPENEX) inhalation solution 1 25 mg, 1 25 mg, Nebulization, TID, 1 25 mg at 02/24/22 0706 **AND** sodium chloride 0 9 % inhalation solution 3 mL, 3 mL, Nebulization, TID, Toula Slight, DO, 3 mL at 02/24/22 0707    levalbuterol (XOPENEX) inhalation solution 1 25 mg, 1 25 mg, Nebulization, Q6H PRN **AND** sodium chloride 0 9 % inhalation solution 3 mL, 3 mL, Nebulization, Q6H PRN, Toula Slight, DO    levothyroxine tablet 50 mcg, 50 mcg, Oral, Early Morning, Toula Slight, DO, 50 mcg at 02/24/22 4580    lisinopril (ZESTRIL) tablet 20 mg, 20 mg, Oral, Daily, Jamshid Xin, DO, 20 mg at 02/24/22 0904    metoprolol tartrate (LOPRESSOR) tablet 50 mg, 50 mg, Oral, Q12H [address], Pernell G Chirayath, DO, 50 mg at 02/24/22 0902    ondansetron (ZOFRAN) injection 4 mg, 4 mg, Intravenous, Q6H PRN, Jamshid Xin, DO    oseltamivir (TAMIFLU) capsule 30 mg, 30 mg, Oral, Q12H [address], Jamshid Xin, DO, 30 mg at 02/24/22 0905    oxybutynin (DITROPAN-XL) 24 hr tablet 5 mg, 5 mg, Oral, Daily, Jamshid Xin, DO, 5 mg at 02/24/22 0903    Insert peripheral IV, , , Once **AND** sodium chloride (PF) 0 9 % injection 3 mL, 3 mL, Intravenous, Q12H [address], Jamshid Xin, DO, 3 mL at 02/24/22 0907    torsemide (DEMADEX) tablet 10 mg, 10 mg, Oral, Daily, Jamshid Xin, DO, 10 mg at 02/24/22 0902     Labs & Results:        Results from last 7 days   Lab Units 02/23/22  0129   WBC Thousand/uL 10 23*   HEMOGLOBIN g/dL 11 6   HEMATOCRIT % 35 5   PLATELETS Thousands/uL 224         Results from last 7 days   Lab Units 02/23/22  0129   POTASSIUM mmol/L 4 0   CHLORIDE mmol/L 102   CO2 mmol/L 27   BUN mg/dL 22   CREATININE mg/dL 0 77   CALCIUM mg/dL 9 5   ALK PHOS U/L 99   ALT U/L 27   AST U/L 27     Results from last 7 days   Lab Units 02/23/22  0129   INR  1 07   PTT seconds 29           Counseling / Coordination of Care  Total floor / unit time spent today 25 minutes  Greater than 50% of total time was spent with the patient and / or family counseling and / or coordination of care

## 2022-02-24 NOTE — ASSESSMENT & PLAN NOTE
Pt requiring 2L O2 via NC in setting of Influenza A infection  She reports cough and SOB that have been improving   She is on 2L O2 NC at 97% on exam    - Monitor and titrate O2 supplementation as needed  - Respiratory protocol, spirometry, and pulmonary toilet

## 2022-02-24 NOTE — PLAN OF CARE
Problem: PHYSICAL THERAPY ADULT  Goal: Performs mobility at highest level of function for planned discharge setting  See evaluation for individualized goals  Description: Treatment/Interventions: Functional transfer training,LE strengthening/ROM,Therapeutic exercise,Endurance training,Patient/family training,Equipment eval/education,Bed mobility,Gait training,Spoke to nursing          See flowsheet documentation for full assessment, interventions and recommendations  Note: Prognosis: Good  Problem List: Decreased strength,Decreased endurance,Impaired balance,Decreased mobility  Assessment: Pt is 80 y o  female seen for PT evaluation s/p admit to [facility] on 2/23/2022 w/ Influenza A with respiratory manifestations  PT consulted to assess pt's functional mobility and d/c needs  Order placed for PT eval and tx, w/ up w/ A order  Comorbidities affecting pt's physical performance at time of assessment include: HTN, CHF, atrial fibrillation, anemia, glaucoma, lumbar stenosis  PTA, pt was residing at [facility]  Pt was ambulating w/ RW and IND w/ ADLs  Personal factors affecting pt at time of IE include: ambulating w/ assistive device, inability to navigate community distances, unable to perform dynamic tasks in community, positive fall history and inability to perform IADLs  Please find objective findings from PT assessment regarding body systems outlined above with impairments and limitations including weakness, impaired balance, decreased endurance, gait deviations, decreased activity tolerance, decreased functional mobility tolerance and fall risk  Pt performed bed mobility at supervision, STS at supervision, and ambulated 10ft x2 w/ RW at [address]  The following objective measures performed on IE also reveal limitations: AM-PAC 6-Clicks: 69/47   Pt's clinical presentation is currently unstable/unpredictable seen in pt's presentation of recent admission for influenza A requiring medical attention, recent decline in function as compared to baseline, fall risk, O2 dependent as compared to baseline  Pt to benefit from continued PT tx to address deficits as defined above and maximize level of functional independent mobility and consistency  From PT/mobility standpoint, recommendation at time of d/c would be home with home health rehabilitation pending progress in order to facilitate return to PLOF  Barriers to Discharge: None        PT Discharge Recommendation: Home with home health rehabilitation (return to [facility] w/ PT services)          See flowsheet documentation for full assessment

## 2022-02-24 NOTE — ASSESSMENT & PLAN NOTE
Pt presented with a cough, SOB with desaturation, and fever since 2/20  Influenza A PCR was positive  She reports improved cough and SOB   Her nebulizer treatments help her to feel better    -Continue Tamiflu (day 3 of 5)  -O2 supplementation as needed   -Respiratory protocol, spirometry and pulmonary toilet   -Supportive care

## 2022-02-24 NOTE — ASSESSMENT & PLAN NOTE
Wheezing noted on admission, suspect triggered by influenza A infection  Received one time dose 40 mg IV Solu-Medrol and nebulizers in ED with improvement   · Continue with Xopenex p r n , Radha Banks  · Monitor respiratory status

## 2022-02-24 NOTE — PROGRESS NOTES
INTERNAL MEDICINE RESIDENCY PROGRESS NOTE     Name: Montie Hatchet   Age & Sex: 80 y o  female   MRN: [id number]  Unit/Bed#: [facility] 709-01   Encounter: [id number]  Team: SLIM    PATIENT INFORMATION     Name: Montie Hatchet   Age & Sex: 80 y o  female   MRN: [id number]  Hospital Stay Days: 0    ASSESSMENT/PLAN     Principal Problem:    Influenza A with respiratory manifestations  Active Problems:    New onset atrial fibrillation ([facility] [address] )    Acute respiratory failure with hypoxia ([facility])    Chronic diastolic CHF (congestive heart failure) ([facility])    Asthma    Hypertension      * Influenza A with respiratory manifestations  Assessment & Plan  Pt presented with a cough, SOB with desaturation, and fever since 2/20  Influenza A PCR was positive  She reports improved cough and SOB  -Continue Tamiflu (day 2 of 5)  -O2 supplementation to maintain saturation above 95  -Respiratory protocol, spirometry and pulmonary toilet   -Supportive care     New onset atrial fibrillation [facility])  Assessment & Plan  Pt found to be in Afib in ED  Possibly due to influenza infection  K+ was 4 0 on 2/23  K+ is 4 4 today  Magnesium is 2 1  TSH is normal   -Increase metoprolol from 25 mg bid to 50 mg bid for rate control  -Monitor potassium and magnesium levels  Goal is to have K+>4 and Mag>2  -On therapeutic Lovenox  CM is price checking on Eliquis   -Cardiology is on board  -Appreciate cardiology recommendations         Acute respiratory failure with hypoxia ([facility] [address] )  Assessment & Plan  Pt requiring 2L O2 via NC in setting of Influenza A infection  She reports cough and SOB that have been improving   - Monitor and titrate O2 supplementation as needed  -Respiratory protocol, spirometry, and pulmonary toilet     Chronic diastolic CHF (congestive heart failure) ([facility])  Assessment & Plan  Wt Readings from Last 3 Encounters:   02/23/22 58 5 kg (129 lb)   01/10/22 58 6 kg (129 lb 3 2 oz)   12/14/21 59 9 kg (132 lb)     Pt has a history of CHF   Pt does not show signs of volume overload  -Continue home medications Furosemide and Lisinopril   -Monitor daily weights  -I/Os  -Salt restriction   -Cardiology onboard           Asthma  Assessment & Plan  Pt had wheezing on admission in the setting of Influenza A infection  She was treated with 40 mg IV Solu-medrol and nebulizer with improvement    -Continue with home inhalers/nebulizers  -Respiratory protocol    Hypertension  Assessment & Plan  -Monitor for changes in BP  -Continue home blood pressure medications amlodipine, lisinopril, and torsemide  -Metoprolol increased per cardiology  -Appreciate cardiology recommendations         Disposition: Due to age and symptoms requiring oxygen supplementation in the setting of new onset Afib, the patient needs to stay inpatient until she is stable  SUBJECTIVE     Patient seen and examined  No acute events overnight  The patient is awake and alert sitting in her bedside chair  She reports her cough and SOB are improving  She reports sleeping and eating well  She is having regular BM  She denies difficulty urinating  She denies CP/N/V/palpitations  ROS otherwise negative  OBJECTIVE     Vitals:    22 2151 22 0758 22 1300 22 1311   BP: 142/75 142/79     BP Location:       Pulse: (!) 125 (!) 132     Resp:       Temp:  97 6 °F (36 4 °C)     TempSrc:       SpO2: 97% 95% 95% 95%   Weight:       Height:          Temperature:   Temp (24hrs), Av 7 °F (37 1 °C), Min:97 6 °F (36 4 °C), Max:99 8 °F (37 7 °C)    Temperature: 97 6 °F (36 4 °C)  Intake & Output:  I/O        0701   0700  0701   0700  0701   0700    P  O   360     I V  (mL/kg)  3 (0 1)     Total Intake(mL/kg)  363 (6 2)     Net  +363                Weights:        Body mass index is 27 43 kg/m²  Weight (last 2 days)     Date/Time Weight    22 1030 58 5 (129)        Physical Exam  Vitals and nursing note reviewed     Constitutional:       General: She is not in acute distress  Appearance: Normal appearance  She is not ill-appearing  HENT:      Head: Normocephalic and atraumatic  Right Ear: External ear normal       Left Ear: External ear normal       Nose: Nose normal       Mouth/Throat:      Mouth: Mucous membranes are moist       Pharynx: Oropharynx is clear  Eyes:      General: No scleral icterus  Extraocular Movements: Extraocular movements intact  Conjunctiva/sclera: Conjunctivae normal    Cardiovascular:      Rate and Rhythm: Tachycardia present  Rhythm irregular  Pulses: Normal pulses  Heart sounds: No murmur heard  No friction rub  No gallop  Pulmonary:      Effort: Pulmonary effort is normal  No respiratory distress  Breath sounds: Rhonchi present  Abdominal:      General: Bowel sounds are normal  There is no distension  Palpations: Abdomen is soft  Tenderness: There is no abdominal tenderness  Musculoskeletal:      Cervical back: Neck supple  Right lower leg: No edema  Left lower leg: No edema  Skin:     General: Skin is warm and dry  Capillary Refill: Capillary refill takes less than 2 seconds  Coloration: Skin is not jaundiced  Neurological:      Mental Status: She is alert and oriented to person, place, and time  Psychiatric:         Mood and Affect: Mood normal          Behavior: Behavior normal        LABORATORY DATA     Labs: I have personally reviewed pertinent reports    Results from last 7 days   Lab Units 02/23/22  0129   WBC Thousand/uL 10 23*   HEMOGLOBIN g/dL 11 6   HEMATOCRIT % 35 5   PLATELETS Thousands/uL 224   NEUTROS PCT % 88*   MONOS PCT % 5      Results from last 7 days   Lab Units 02/24/22  0841 02/23/22  0129   POTASSIUM mmol/L 4 4 4 0   CHLORIDE mmol/L 104 102   CO2 mmol/L 26 27   BUN mg/dL 36* 22   CREATININE mg/dL 1 00 0 77   CALCIUM mg/dL 8 9 9 5   ALK PHOS U/L  --  99   ALT U/L  --  27   AST U/L  --  27     Results from last 7 days   Lab Units 02/24/22  0841 MAGNESIUM mg/dL 2 1          Results from last 7 days   Lab Units 02/23/22  0129   INR  1 07   PTT seconds 29     Results from last 7 days   Lab Units 02/23/22  0129   LACTIC ACID mmol/L 1 6           IMAGING & DIAGNOSTIC TESTING     Radiology Results: I have personally reviewed pertinent reports  XR chest portable    Result Date: 2/23/2022  Impression: No active pulmonary disease  Workstation performed: [id number]     Other Diagnostic Testing: I have personally reviewed pertinent reports      ACTIVE MEDICATIONS     Current Facility-Administered Medications   Medication Dose Route Frequency    acetaminophen (TYLENOL) tablet 650 mg  650 mg Oral Q6H PRN    amLODIPine (NORVASC) tablet 5 mg  5 mg Oral Daily    aspirin (ECOTRIN LOW STRENGTH) EC tablet 81 mg  81 mg Oral Daily    benzonatate (TESSALON PERLES) capsule 100 mg  100 mg Oral TID PRN    brimonidine tartrate 0 2 % ophthalmic solution 1 drop  1 drop Both Eyes BID    calcium carbonate (OYSTER SHELL,OSCAL) 500 mg tablet 1 tablet  1 tablet Oral Daily With Breakfast    cholecalciferol (VITAMIN D3) tablet 1,000 Units  1,000 Units Oral Daily    enoxaparin (LOVENOX) subcutaneous injection 60 mg  1 mg/kg Subcutaneous Q12H MONICO    fluticasone (FLONASE) 50 mcg/act nasal spray 2 spray  2 spray Each Nare Daily    fluticasone-vilanterol (BREO ELLIPTA) 200-25 MCG/INH inhaler 1 puff  1 puff Inhalation Daily    gabapentin (NEURONTIN) capsule 300 mg  300 mg Oral HS    latanoprost (XALATAN) 0 005 % ophthalmic solution 1 drop  1 drop Both Eyes HS    levalbuterol (XOPENEX) inhalation solution 1 25 mg  1 25 mg Nebulization TID    And    sodium chloride 0 9 % inhalation solution 3 mL  3 mL Nebulization TID    levalbuterol (XOPENEX) inhalation solution 1 25 mg  1 25 mg Nebulization Q6H PRN    And    sodium chloride 0 9 % inhalation solution 3 mL  3 mL Nebulization Q6H PRN    levothyroxine tablet 50 mcg  50 mcg Oral Early Morning    lisinopril (ZESTRIL) tablet 20 mg 20 mg Oral Daily    metoprolol tartrate (LOPRESSOR) tablet 50 mg  50 mg Oral Q12H [address]    ondansetron (ZOFRAN) injection 4 mg  4 mg Intravenous Q6H PRN    oseltamivir (TAMIFLU) capsule 30 mg  30 mg Oral Q12H [address]    oxybutynin (DITROPAN-XL) 24 hr tablet 5 mg  5 mg Oral Daily    sodium chloride (PF) 0 9 % injection 3 mL  3 mL Intravenous Q12H [address]    torsemide (DEMADEX) tablet 10 mg  10 mg Oral Daily       VTE Pharmacologic Prophylaxis: Reason for no pharmacologic prophylaxis Lovenox  VTE Mechanical Prophylaxis: sequential compression device    Portions of the record may have been created with voice recognition software  Occasional wrong word or "sound a like" substitutions may have occurred due to the inherent limitations of voice recognition software    Read the chart carefully and recognize, using context, where substitutions have occurred   ==  Jolly Hanna [address] [place] [address]

## 2022-02-24 NOTE — PLAN OF CARE
Problem: OCCUPATIONAL THERAPY ADULT  Goal: Performs self-care activities at highest level of function for planned discharge setting  See evaluation for individualized goals  Description: Treatment Interventions: ADL retraining,Functional transfer training,UE strengthening/ROM,Endurance training,Equipment evaluation/education,Patient/family training,Cognitive reorientation,Compensatory technique education,Energy conservation,Activityengagement  Equipment Recommended:  (No DME needs)       See flowsheet documentation for full assessment, interventions and recommendations  Note: Limitation: Decreased ADL status,Decreased endurance,Decreased self-care trans,Decreased high-level ADLs,Decreased Safe judgement during ADL  Prognosis: Fair  Assessment: Pt is a 80 y o  female who was [facility] to [facility] on 2/23/2022 with cough, fever, SOB and now here with  Influenza A with respiratory manifestations , tachycardia, CHF, HTN, asthma,  Pt's problem list also includes PMH of  has a past medical history of Anxiety, Arthritis, Asthma, Burn, Chronic pain disorder, GERD (gastroesophageal reflux disease), Glaucoma, Hiatal hernia, HLD (hyperlipidemia), fall (2015), Hypertension, PVD (peripheral vascular disease) ([facility] [address] and Thyroid disease    At baseline pt was completing I with ADL's, JEANNETTE assisted with IADL's, +RW with mobility tasks  Pt lives with at [facility] with a first floor set-up  Currently pt requires min a UB, mod a LB for overall ADLS and min a with RW and assist of 02 line management for functional mobility/transfers  Pt currently presents with impairments in the following categories -difficulty performing ADLS and difficulty performing IADLS  activity tolerance, endurance and standing balance/tolerance   These impairments, as well as pt's fatigue, CAMPOS, decreased caregiver support and risk for falls  limit pt's ability to safely engage in all baseline areas of occupation, includingbathing, dressing, toileting, functional mobility/transfers, community mobility, laundry , driving, house maintenance, medication management, meal prep, cleaning, social participation  and leisure activities  From The patient's raw score on the AM-PAC Daily Activity inpatient short form is 17, standardized score is 37 26, less than 39 4  Patients at this level are likely to benefit from discharge to home with home OT  Please refer to the recommendation of the Occupational Therapist for safe discharge planning  OT standpoint, recommend home with increased support and home OT services upon D/C  OT will continue to follow to address the below stated goals  OT Discharge Recommendation: Home with home health rehabilitation  OT - OK to Discharge:  Yes

## 2022-02-24 NOTE — OCCUPATIONAL THERAPY NOTE
Occupational Therapy Evaluation     Patient Name: Mayur Byers  IUMXR'U Date: 2/24/2022  Problem List  Principal Problem:    Influenza A with respiratory manifestations  Active Problems:    Asthma    Hypertension    Chronic diastolic CHF (congestive heart failure) ([facility] [address] )    New onset atrial fibrillation (HCC)    Acute respiratory failure with hypoxia (HCC)    Past Medical History  Past Medical History:   Diagnosis Date    Anxiety     Arthritis     OA    Asthma     good control    Burn     Chronic pain disorder     spinal stenosis    GERD (gastroesophageal reflux disease)     off and on    Glaucoma     Hiatal hernia     HLD (hyperlipidemia)     Hx of fall 2015    Hypertension     PVD (peripheral vascular disease) ([facility] [address] )     Thyroid disease     hypo     Past Surgical History  Past Surgical History:   Procedure Laterality Date    ANGIOPLASTY      jacquelyn lower legs    BLADDER SUSPENSION      with mesh    CATARACT EXTRACTION Bilateral     COLONOSCOPY      COLONOSCOPY W/ POLYPECTOMY      via Colostomy; Pt does not have colostomy - no listing for a polypectomy of colon per [employer]    DILATION AND CURETTAGE OF UTERUS      NASAL POLYP EXCISION      Nasal Endoscopy Polypectomy    OTHER SURGICAL HISTORY  04/2007    Uterine prolapse and repair     ID REVISE MEDIAN N/CARPAL TUNNEL SURG Left 1/10/2019    Procedure: RELEASE LEFT CARPAL TUNNEL;  Surgeon: Jaron Noriega MD;  Location: [address] [place] [address];  Service: Orthopedics             02/24/22 0812   OT Last Visit   OT Visit Date 02/24/22   Note Type   Note type Evaluation   Restrictions/Precautions   Weight Bearing Precautions Per Order No   Other Precautions Telemetry; Fall Risk;O2  (+2L 02)   Pain Assessment   Pain Assessment Tool 0-10   Pain Score No Pain   Home Living   Type of Home Assisted living  ([facility])   Home Layout One level   Bathroom Shower/Tub Walk-in shower   Bathroom Toilet Raised   Bathroom Equipment Shower chair;Grab bars in shower   Bathroom Accessibility Accessible   Home Equipment Walker   Prior Function   Level of [place] Independent with ADLs and functional mobility   Lives With Alone   Receives Help From Other (Comment)  (facility)   ADL Assistance Independent   IADLs Needs assistance   Falls in the last 6 months 1 to 4   Vocational Retired   Lifestyle   Autonomy I with ADL's, assistance from facility with all IADL's including meals, laundry, cleaning  ((-) 02 at baseline)   Reciprocal Relationships facility, daughters (2)   Service to Others retired   Intrinsic Gratification reading   [address] 19 (WDL) [address]  5  [facility] 4  Minimal Assistance   Grooming Deficit Assist with Brushing hair, stood at sink to wash hands post set-up  (2* to tangles to back )   [address] 5  Supervision/Setup   LB Bathing Assistance 3  Moderate Assistance   [address] 2  Maximal Assistance   LB Dressing Deficit Don/doff R sock; Don/doff L sock   Toileting Assistance  4  Minimal Assistance   Bed Mobility   Supine to Sit 5  Supervision   Additional items Assist x 1   Sit to Supine Unable to assess   Additional Comments pt left at EOB with all needs met   Transfers   Sit to Stand 5  Supervision   Additional items Assist x 1   Stand to Sit 5  Supervision   Additional items Assist x 1   Toilet transfer 4  Minimal assistance   Additional items Assist x 1;Standard toilet   Additional Comments +RW with transfers, assist with 02 line management   Functional Mobility   Functional Mobility 4  Minimal assistance   Additional Comments min a x 1 with RW short distance functional mobility, mild CAMPOS resolved with seated rest break, occassional safety cue with RW   Additional items Rolling walker   Balance   Static Sitting Fair +   Dynamic Sitting Fair   Static Standing Fair -   Dynamic Standing Poor +   Ambulatory Poor +   Activity Tolerance Activity Tolerance Patient limited by fatigue; Other (Comment)  (CAMPOS)   Medical Staff Made Aware PT Marilee Canseco   Nurse Made Aware RN cleared pt for therapy   RUE Assessment   RUE Assessment X  (left 3-/5 2* to rotator cuff tear, otherwise WFL)   LUE Assessment   LUE Assessment WFL   Hand Function   Gross Motor Coordination Functional   Fine Motor Coordination Functional   Vision-Basic Assessment   Current Vision Wears glasses all the time   Cognition   Overall Cognitive Status Impaired   Arousal/Participation Alert; Cooperative   Attention Attends with cues to redirect   Orientation Level Oriented X4   Memory Decreased recall of precautions;Decreased recall of recent events;Decreased short term memory   Following Commands Follows one step commands with increased time or repetition   Comments pt motivated for therapy, oriented x 4 occassional safety, problem solving with cue with evaluation  , slow processing/[place]  Assessment   Limitation Decreased ADL status; Decreased endurance;Decreased self-care trans;Decreased high-level ADLs; Decreased Safe judgement during ADL   Prognosis Fair   Assessment Pt is a 80 y o  female who was admitted to [facility] on 2/23/2022 with cough, fever, SOB and now here with  Influenza A with respiratory manifestations , tachycardia, CHF, HTN, asthma,  Pt's problem list also includes PMH of  has a past medical history of Anxiety, Arthritis, Asthma, Burn, Chronic pain disorder, GERD (gastroesophageal reflux disease), Glaucoma, Hiatal hernia, HLD (hyperlipidemia), fall (2015), Hypertension, PVD (peripheral vascular disease) ([facility] [address] ), and Thyroid disease    At baseline pt was completing I with ADL's, [facility] assisted with IADL's, +RW with mobility tasks  Pt lives with at [facility] with a first floor set-up  Currently pt requires min a UB, mod a LB for overall ADLS and min a with RW and assist of 02 line management for functional mobility/transfers   Pt currently presents with impairments in the following categories -difficulty performing ADLS and difficulty performing IADLS  activity tolerance, endurance and standing balance/tolerance  These impairments, as well as pt's fatigue, CAMPOS, decreased caregiver support and risk for falls  limit pt's ability to safely engage in all baseline areas of occupation, includingbathing, dressing, toileting, functional mobility/transfers, community mobility, laundry , driving, house maintenance, medication management, meal prep, cleaning, social participation  and leisure activities  From The patient's raw score on the AM-PAC Daily Activity inpatient short form is 17, standardized score is 37 26, less than 39 4  Patients at this level are likely to benefit from discharge to home with home OT  Please refer to the recommendation of the Occupational Therapist for safe discharge planning  OT standpoint, recommend home with increased support and home OT services upon D/C  OT will continue to follow to address the below stated goals  Goals   Patient Goals go home   LTG Time Frame 10-14   Long Term Goal #1 see goals below   Plan   Treatment Interventions ADL retraining;Functional transfer training;UE strengthening/ROM; Endurance training;Equipment evaluation/education;Patient/family training;Cognitive reorientation; Compensatory technique education; Energy conservation; Activityengagement   Goal Expiration Date 03/10/22   OT Frequency 3-5x/wk   Recommendation   OT Discharge Recommendation Home with home health rehabilitation   Equipment Recommended   (No DME needs)   OT - OK to Discharge Yes   AM-PAC Daily Activity Inpatient   Lower Body Dressing 2   Bathing 2   Toileting 3   Upper Body Dressing 3   Grooming 3   Eating 4   Daily Activity Raw Score 17   Daily Activity Standardized Score (Calc for Raw Score >=11) 37 26   AM-PAC Applied Cognition Inpatient   Following a Speech/Presentation 2   Understanding Ordinary Conversation 4   Taking Medications 3   Remembering Where Things Are Placed or Put Away 4   Remembering List of 4-5 Errands 3   Taking Care of Complicated Tasks 2   Applied Cognition Raw Score 18   Applied Cognition Standardized Score 38 07         Occupational Therapy Goals:    *Mod I with bed mobility to engage in functional tasks  *Mod I Adl's after setup with use of AE PRN  *Mod I toileting and clothing management   *Mod I functional mobility and transfers to/from all surfaces with Fair + dynamic balance and safety for participation in dynamic adls and iadl tasks   *Demonstrate good carryover with safe use of RW during functional tasks   *Assess DME needs   *Increase activity tolerance to 25-30 minutes for participation in adls and enjoyable activities  *Pt to participate in further cognitive testing with good attention and participation to assist with safe d/c recommendations  *Mod I with Simulated IADL management task  *Pt will follow 100% simple one step verbal commands and be A/Ox4 consistently t/o use of external environmental cues w/ mod I  Pt will independently identify and utilize 2-3 coping strategies to increase positive affect and promote overall well-being  *Demonstrate good carryover of pt/family education and training with good tolerance for increased safety and independence with ADL's/ADl's     *Patient will demonstrate 100% carryover of energy conservation techniques t/o functional I/ADL/leisure tasks w/o cues s/p skilled education to increase endurance during functional tasks    Renate KLEIN, OTR/L

## 2022-02-24 NOTE — ASSESSMENT & PLAN NOTE
Pt found to be in Afib in ED  Possibly due to influenza infection  Rates 80s-100s  K+ is 4 4 today  Magnesium is 2 6  TSH is normal   -Metoprolol increased to 75 mg bid for rate control   -Monitor potassium and magnesium levels  Goal is to have K+>4 and Mag>2  -On therapeutic Lovenox  CM is price checking on Eliquis    -Appreciate cardiology recommendations

## 2022-02-24 NOTE — ASSESSMENT & PLAN NOTE
Wt Readings from Last 3 Encounters:   02/23/22 58 5 kg (129 lb)   01/10/22 58 6 kg (129 lb 3 2 oz)   12/14/21 59 9 kg (132 lb)     Pt has a history of CHF  Pt does not show signs of volume overload     -Continue home medications Furosemide and Lisinopril   -Monitor daily weights  -I/Os  -Fluid restriction  -Salt restriction   -Cardiology onboard

## 2022-02-24 NOTE — PLAN OF CARE
Problem: MOBILITY - ADULT  Goal: Maintain or return to baseline ADL function  Description: INTERVENTIONS:  -  Assess patient's ability to carry out ADLs; assess patient's baseline for ADL function and identify physical deficits which impact ability to perform ADLs (bathing, care of mouth/teeth, toileting, grooming, dressing, etc )  - Assess/evaluate cause of self-care deficits   - Assess range of motion  - Assess patient's mobility; develop plan if impaired  - Assess patient's need for assistive devices and provide as appropriate  - Encourage maximum independence but intervene and supervise when necessary  - Involve family in performance of ADLs  - Assess for home care needs following discharge   - Consider OT consult to assist with ADL evaluation and planning for discharge  - Provide patient education as appropriate  Outcome: Progressing  Goal: Maintains/Returns to pre admission functional level  Description: INTERVENTIONS:  - Perform BMAT or MOVE assessment daily    - Set and communicate daily mobility goal to care team and patient/family/caregiver  - Collaborate with rehabilitation services on mobility goals if consulted  - Perform Range of Motion 3 times a day  - Reposition patient every 3 hours    - Dangle patient 3 times a day  - Stand patient 3 times a day  - Ambulate patient 33 times a day  - Out of bed to chair 3 times a day   - Out of bed for meals 3 times a day  - Out of bed for toileting  - Record patient progress and toleration of activity level   Outcome: Progressing     Problem: Potential for Falls  Goal: Patient will remain free of falls  Description: INTERVENTIONS:  - Educate patient/family on patient safety including physical limitations  - Instruct patient to call for assistance with activity   - Consult OT/PT to assist with strengthening/mobility   - Keep Call bell within reach  - Keep bed low and locked with side rails adjusted as appropriate  - Keep care items and personal belongings within reach  - Initiate and maintain comfort rounds  - Make Fall Risk Sign visible to staff  - Offer Toileting every 3 Hours, in advance of need  - Initiate/Maintain 3alarm  - Obtain necessary fall risk management equipment: 3  - Apply yellow socks and bracelet for high fall risk patients  - Consider moving patient to room near nurses station  Outcome: Progressing

## 2022-02-25 PROBLEM — J96.01 ACUTE RESPIRATORY FAILURE WITH HYPOXIA (HCC): Status: RESOLVED | Noted: 2022-02-23 | Resolved: 2022-02-25

## 2022-02-25 LAB
ANION GAP SERPL CALCULATED.3IONS-SCNC: 7 MMOL/L (ref 4–13)
BASOPHILS # BLD AUTO: 0.03 THOUSANDS/ΜL (ref 0–0.1)
BASOPHILS NFR BLD AUTO: 0 % (ref 0–1)
BUN SERPL-MCNC: 42 MG/DL (ref 5–25)
CALCIUM SERPL-MCNC: 9.1 MG/DL (ref 8.3–10.1)
CHLORIDE SERPL-SCNC: 104 MMOL/L (ref 100–108)
CO2 SERPL-SCNC: 24 MMOL/L (ref 21–32)
CREAT SERPL-MCNC: 0.86 MG/DL (ref 0.6–1.3)
EOSINOPHIL # BLD AUTO: 0.17 THOUSAND/ΜL (ref 0–0.61)
EOSINOPHIL NFR BLD AUTO: 2 % (ref 0–6)
ERYTHROCYTE [DISTWIDTH] IN BLOOD BY AUTOMATED COUNT: 14.4 % (ref 11.6–15.1)
GFR SERPL CREATININE-BSD FRML MDRD: 59 ML/MIN/1.73SQ M
GLUCOSE SERPL-MCNC: 78 MG/DL (ref 65–140)
HCT VFR BLD AUTO: 39 % (ref 34.8–46.1)
HGB BLD-MCNC: 12.8 G/DL (ref 11.5–15.4)
IMM GRANULOCYTES # BLD AUTO: 0.02 THOUSAND/UL (ref 0–0.2)
IMM GRANULOCYTES NFR BLD AUTO: 0 % (ref 0–2)
LYMPHOCYTES # BLD AUTO: 1.22 THOUSANDS/ΜL (ref 0.6–4.47)
LYMPHOCYTES NFR BLD AUTO: 16 % (ref 14–44)
MAGNESIUM SERPL-MCNC: 2.6 MG/DL (ref 1.6–2.6)
MCH RBC QN AUTO: 28.6 PG (ref 26.8–34.3)
MCHC RBC AUTO-ENTMCNC: 32.8 G/DL (ref 31.4–37.4)
MCV RBC AUTO: 87 FL (ref 82–98)
MONOCYTES # BLD AUTO: 0.67 THOUSAND/ΜL (ref 0.17–1.22)
MONOCYTES NFR BLD AUTO: 9 % (ref 4–12)
NEUTROPHILS # BLD AUTO: 5.43 THOUSANDS/ΜL (ref 1.85–7.62)
NEUTS SEG NFR BLD AUTO: 73 % (ref 43–75)
NRBC BLD AUTO-RTO: 0 /100 WBCS
PLATELET # BLD AUTO: 198 THOUSANDS/UL (ref 149–390)
PMV BLD AUTO: 13.3 FL (ref 8.9–12.7)
POTASSIUM SERPL-SCNC: 4.4 MMOL/L (ref 3.5–5.3)
RBC # BLD AUTO: 4.48 MILLION/UL (ref 3.81–5.12)
SODIUM SERPL-SCNC: 135 MMOL/L (ref 136–145)
WBC # BLD AUTO: 7.54 THOUSAND/UL (ref 4.31–10.16)

## 2022-02-25 PROCEDURE — 99233 SBSQ HOSP IP/OBS HIGH 50: CPT | Performed by: INTERNAL MEDICINE

## 2022-02-25 PROCEDURE — 83735 ASSAY OF MAGNESIUM: CPT | Performed by: STUDENT IN AN ORGANIZED HEALTH CARE EDUCATION/TRAINING PROGRAM

## 2022-02-25 PROCEDURE — 80048 BASIC METABOLIC PNL TOTAL CA: CPT | Performed by: STUDENT IN AN ORGANIZED HEALTH CARE EDUCATION/TRAINING PROGRAM

## 2022-02-25 PROCEDURE — 94640 AIRWAY INHALATION TREATMENT: CPT

## 2022-02-25 PROCEDURE — 99231 SBSQ HOSP IP/OBS SF/LOW 25: CPT | Performed by: INTERNAL MEDICINE

## 2022-02-25 PROCEDURE — 94760 N-INVAS EAR/PLS OXIMETRY 1: CPT

## 2022-02-25 PROCEDURE — 94664 DEMO&/EVAL PT USE INHALER: CPT

## 2022-02-25 PROCEDURE — 85025 COMPLETE CBC W/AUTO DIFF WBC: CPT | Performed by: STUDENT IN AN ORGANIZED HEALTH CARE EDUCATION/TRAINING PROGRAM

## 2022-02-25 RX ORDER — METOPROLOL TARTRATE 50 MG/1
50 TABLET, FILM COATED ORAL 4 TIMES DAILY
Status: DISCONTINUED | OUTPATIENT
Start: 2022-02-25 | End: 2022-02-25

## 2022-02-25 RX ADMIN — OSELTAMIVIR PHOSPHATE 30 MG: 30 CAPSULE ORAL at 08:14

## 2022-02-25 RX ADMIN — ISODIUM CHLORIDE 3 ML: 0.03 SOLUTION RESPIRATORY (INHALATION) at 06:56

## 2022-02-25 RX ADMIN — LEVALBUTEROL HYDROCHLORIDE 1.25 MG: 1.25 SOLUTION, CONCENTRATE RESPIRATORY (INHALATION) at 06:56

## 2022-02-25 RX ADMIN — ASPIRIN 81 MG: 81 TABLET, COATED ORAL at 08:12

## 2022-02-25 RX ADMIN — METOPROLOL TARTRATE 75 MG: 50 TABLET, FILM COATED ORAL at 21:20

## 2022-02-25 RX ADMIN — LEVOTHYROXINE SODIUM 50 MCG: 50 TABLET ORAL at 08:11

## 2022-02-25 RX ADMIN — TORSEMIDE 10 MG: 20 TABLET ORAL at 08:12

## 2022-02-25 RX ADMIN — Medication 1000 UNITS: at 08:12

## 2022-02-25 RX ADMIN — LEVALBUTEROL HYDROCHLORIDE 1.25 MG: 1.25 SOLUTION, CONCENTRATE RESPIRATORY (INHALATION) at 19:27

## 2022-02-25 RX ADMIN — BRIMONIDINE TARTRATE 1 DROP: 2 SOLUTION OPHTHALMIC at 17:48

## 2022-02-25 RX ADMIN — LEVALBUTEROL HYDROCHLORIDE 1.25 MG: 1.25 SOLUTION, CONCENTRATE RESPIRATORY (INHALATION) at 14:02

## 2022-02-25 RX ADMIN — BRIMONIDINE TARTRATE 1 DROP: 2 SOLUTION OPHTHALMIC at 08:14

## 2022-02-25 RX ADMIN — LATANOPROST 1 DROP: 50 SOLUTION OPHTHALMIC at 21:20

## 2022-02-25 RX ADMIN — FLUTICASONE FUROATE AND VILANTEROL TRIFENATATE 1 PUFF: 200; 25 POWDER RESPIRATORY (INHALATION) at 08:10

## 2022-02-25 RX ADMIN — ISODIUM CHLORIDE 3 ML: 0.03 SOLUTION RESPIRATORY (INHALATION) at 14:04

## 2022-02-25 RX ADMIN — ENOXAPARIN SODIUM 60 MG: 60 INJECTION SUBCUTANEOUS at 21:20

## 2022-02-25 RX ADMIN — AMLODIPINE BESYLATE 5 MG: 5 TABLET ORAL at 08:12

## 2022-02-25 RX ADMIN — LISINOPRIL 20 MG: 20 TABLET ORAL at 08:12

## 2022-02-25 RX ADMIN — ISODIUM CHLORIDE 3 ML: 0.03 SOLUTION RESPIRATORY (INHALATION) at 19:28

## 2022-02-25 RX ADMIN — METOPROLOL TARTRATE 75 MG: 50 TABLET, FILM COATED ORAL at 08:12

## 2022-02-25 RX ADMIN — CALCIUM 1 TABLET: 500 TABLET ORAL at 08:11

## 2022-02-25 RX ADMIN — ENOXAPARIN SODIUM 60 MG: 60 INJECTION SUBCUTANEOUS at 08:14

## 2022-02-25 RX ADMIN — GABAPENTIN 300 MG: 300 CAPSULE ORAL at 21:20

## 2022-02-25 RX ADMIN — OSELTAMIVIR PHOSPHATE 30 MG: 30 CAPSULE ORAL at 21:20

## 2022-02-25 RX ADMIN — FLUTICASONE PROPIONATE 2 SPRAY: 50 SPRAY, METERED NASAL at 08:14

## 2022-02-25 RX ADMIN — OXYBUTYNIN CHLORIDE 5 MG: 5 TABLET, EXTENDED RELEASE ORAL at 08:11

## 2022-02-25 NOTE — ASSESSMENT & PLAN NOTE
Wt Readings from Last 3 Encounters:   02/23/22 58 5 kg (129 lb)   01/10/22 58 6 kg (129 lb 3 2 oz)   12/14/21 59 9 kg (132 lb)     Appears euvolemic  · Continue metoprolol 75 mg b i d    · Torsemide 10 mg daily  · Salt and fluid restriction  · Monitor daily weights, I/O, volume status

## 2022-02-25 NOTE — DISCHARGE INSTRUCTIONS
Take metoprolol as prescribed  Take Eliquis twice a day as prescribed  Follow-up with PCP in 1-2 weeks and Cardiology  Do home physical therapy  A-fib (Atrial Fibrillation)   AMBULATORY CARE:   Atrial fibrillation (A-fib)  is an irregular heartbeat  It reduces your heart's ability to pump blood through your body  A-fib may come and go, or it may be a long-term condition  A-fib can cause life-threatening blood clots, stroke, or heart failure  It is important to treat and manage A-fib to help prevent these problems  Common signs and symptoms include the following:   · A heartbeat that races, pounds, or flutters    · Weakness, severe tiredness, or confusion    · Feeling lightheaded, sweaty, dizzy, or faint    · Shortness of breath or anxiety    · Chest pain or pressure    Call your local emergency number (911 in the [address] [facility]) or have someone call if:   · You have any of the following signs of a heart attack:      ? Squeezing, pressure, or pain in your chest    ? You may  also have any of the following:     § Discomfort or pain in your back, neck, jaw, stomach, or arm    § Shortness of breath    § Nausea or vomiting    § Lightheadedness or a sudden cold sweat    · You have any of the following signs of a stroke:      ? Numbness or drooping on one side of your face     ? Weakness in an arm or leg    ? Confusion or difficulty speaking    ? Dizziness, a severe headache, or vision loss    Call your doctor or cardiologist if:   · Your arm or leg feels warm, tender, and painful  It may look swollen and red  · Your heart rate is more than 110 beats per minute  · You have new or worsening swelling in your legs, feet, ankles, or abdomen  · You are short of breath, even at rest     · You have questions or concerns about your condition or care  Treatment for A-fib:  Conditions that cause A-fib, such as thyroid disease, will be treated   You may also need any of the following:  · Heart medicines  help control your heart rate or rhythm  You may need more than one medicine to treat your symptoms  · Antiplatelet or blood thinner medicines  help prevent blood clots and stroke  · Cardioversion  is a procedure to return your heart rate and rhythm to normal  It can be done using medicines or electric shock  · A-fib ablation  is a procedure that uses energy to burn a small area of heart tissue  This creates scar tissue and prevents electrical signals that cause A-fib  You may need this procedure more than once  Ask for more information on A-fib ablation  · A pacemaker  may be inserted into your heart  A pacemaker is a device that controls your heartbeat  A pacemaker may be inserted during an ablation procedure or surgery  Ask your healthcare provider for more information on pacemakers  · Surgery  may be needed if other procedures do not work  During surgery your healthcare provider will make cuts in the upper part of your heart  The provider will stitch the cuts together to create scar tissue  The scar tissue will prevent electrical signals that cause A-fib  Manage A-fib:   · Know your target heart rate  Learn how to check your pulse and monitor your heart rate  · Know the risks if you choose to drink alcohol  Alcohol can increase your risk for A-fib or make A-fib harder to manage  Ask your healthcare provider if it is okay for you to drink any alcohol  He or she can help you set limits for the number of drinks you have in 24 hours and in a week  A drink of alcohol is 12 ounces of beer, 5 ounces of wine, or 1½ ounces of liquor  · Do not smoke  Nicotine can cause heart damage and make it more difficult to manage your A-fib  Do not use e-cigarettes or smokeless tobacco in place of cigarettes or to help you quit  They still contain nicotine  Ask your healthcare provider for information if you currently smoke and need help quitting  · Eat heart-healthy foods    Heart healthy foods will help keep your cholesterol low  These include fruits, vegetables, whole-grain breads, low-fat dairy products, beans, lean meats, and fish  Replace butter and margarine with heart-healthy oils such as olive oil and canola oil  · Maintain a healthy weight  Ask your healthcare provider what a healthy weight is for you  Ask him or her to help you create a safe weight loss plan if you are overweight  Even a small goal of a 10% weight loss can improve your heart health  · Get regular physical activity  Physical activity helps improve your heart health  Get at least 150 minutes of moderate aerobic physical activity each week  Your healthcare provider can help you create an activity plan  · Manage other health conditions  This includes high blood pressure or cholesterol, sleep apnea, diabetes, and other heart conditions  Take medicine as directed and follow your treatment plan  Your healthcare provider may need to change a medicine you are taking if it is causing your A-fib  Do not  stop taking any medicine unless directed by your provider  Follow up with your doctor or cardiologist as directed: You will need regular blood tests and monitoring  Write down your questions so you remember to ask them during your visits  © Copyright [employer] 2022 Information is for End User's use only and may not be sold, redistributed or otherwise used for commercial purposes  All illustrations and images included in CareNotes® are the copyrighted property of A [employer] M [employer] Inc  or [employer]  The above information is an  only  It is not intended as medical advice for individual conditions or treatments  Talk to your doctor, nurse or pharmacist before following any medical regimen to see if it is safe and effective for you

## 2022-02-25 NOTE — DISCHARGE SUMMARY
INTERNAL MEDICINE RESIDENCY DISCHARGE SUMMARY     Ronald Grant   80 y o  female  MRN: [id number]  Room/Bed: [facility] 70/[facility] [address] [place] [address]   Encounter: [id number]    Principal Problem:    Influenza A with respiratory manifestations  Active Problems:    New onset atrial fibrillation ([address] )    Acute respiratory failure with hypoxia (HCC)    Chronic diastolic CHF (congestive heart failure) (HCC)    Asthma    Hypertension      * Influenza A with respiratory manifestations  Assessment & Plan  Pt presented with a cough, SOB with desaturation, and fever since 2/20  Influenza A PCR was positive  She reports improved cough and SOB  Her nebulizer treatments help her to feel better    -Continue Tamiflu (day 3 of 5)  -O2 supplementation as needed   -Respiratory protocol, spirometry and pulmonary toilet   -Supportive care     New onset atrial fibrillation [facility])  Assessment & Plan  Pt found to be in Afib in ED  Possibly due to influenza infection  Rates 80s-100s  K+ is 4 4 today  Magnesium is 2 6  TSH is normal   -Metoprolol increased to 75 mg bid for rate control   -Monitor potassium and magnesium levels  Goal is to have K+>4 and Mag>2  -On therapeutic Lovenox  CM is price checking on Eliquis  -Appreciate cardiology recommendations         Acute respiratory failure with hypoxia (HCC)  Assessment & Plan  Pt requiring 2L O2 via NC in setting of Influenza A infection  She reports cough and SOB that have been improving  She is on 2L O2 NC at 97% on exam    - Monitor and titrate O2 supplementation as needed  - Respiratory protocol, spirometry, and pulmonary toilet     Chronic diastolic CHF (congestive heart failure) ([facility])  Assessment & Plan  Wt Readings from Last 3 Encounters:   02/23/22 58 5 kg (129 lb)   01/10/22 58 6 kg (129 lb 3 2 oz)   12/14/21 59 9 kg (132 lb)     Pt has a history of CHF  Pt does not show signs of volume overload     -Continue home medications Furosemide and Lisinopril   -Monitor daily weights  -I/Os  -Fluid restriction  -Salt restriction   -Cardiology onboard           Asthma  Assessment & Plan  Pt had wheezing on admission in the setting of Influenza A infection  She was treated with 40 mg IV Solu-medrol and nebulizer with improvement    -Continue with home inhalers/nebulizers    -Respiratory protocol    Hypertension  Assessment & Plan  -Monitor for changes in BP  -Continue home blood pressure medications amlodipine, lisinopril, and torsemide  -Metoprolol increased  -Appreciate cardiology recommendations     For updated plan please refer to progress note on 02/25/2020  DETAILS OF HOSPITAL COURSE   Pt is a 81 yo woman who presented on 2/23 with fever, SOB with hypoxia, and cough  She tested positive for Influenza A  CXR showed no active pulmonary disease  She was found to be in new onset Afib  TSH was within normal limits  She was placed on continuous telemetry while admitted  She was started on Tamiflu for for her infection  She initially required 5L O2 NC  Her need decreased quickly  Now saturating well on room air  Received nebulizer treatments prn with improvement  SOB resolved  Started on metoprolol for rate control for new onset atrial fibrillation  Evaluated by Cardiology  Metoprolol titrated to 75 mg bid  Rate now in the 70s-90s  Stable for discharge today  Pt to follow up with [facility] cardiology post discharge as well as her PCP in 1-2 weeks  Pt discharged with Metoprolol 75 mg bid and Eliquis      DISCHARGE INFORMATION     PCP at Discharge: Mely Bustamante DO     Admitting Provider: Najma Underwood MD  Admission Date: 2/23/2022    Discharge Provider: Najma Underwood MD  Discharge Date: 2/25/2022    Discharge Disposition: [facility]  Discharge Condition: good  Discharge with Lines: no    Discharge Diet: cardiac diet  Activity Restrictions: none  Test Results Pending at Discharge: Blood cultures x2    Discharge Diagnoses:  Principal Problem: Influenza A with respiratory manifestations  Active Problems:    New onset atrial fibrillation (HCC)    Acute respiratory failure with hypoxia (HCC)    Chronic diastolic CHF (congestive heart failure) (HCC)    Asthma    Hypertension  Resolved Problems:    * No resolved hospital problems  *      Consulting Providers:  Dr Gifty Mccullough Cardiologist     Diagnostic & Therapeutic Procedures Performed:  XR chest portable    Result Date: 2/23/2022  Impression: No active pulmonary disease  Workstation performed: [id number]       Code Status: Level 1 - Full Code  Advance Directive & Living Will: <no information>  Power of :    POLST:      Medications:  Current Discharge Medication List        Current Discharge Medication List      START taking these medications    Details   apixaban (Eliquis) 5 mg Take 1 tablet (5 mg total) by mouth 2 (two) times a day  Qty: 60 tablet, Refills: 0    Associated Diagnoses: New onset atrial fibrillation ([address] )           Current Discharge Medication List      CONTINUE these medications which have NOT CHANGED    Details   albuterol (Ventolin HFA) 90 mcg/act inhaler Inhale 2 puffs 3 (three) times a day  Qty: 1 Inhaler, Refills: 2    Comments: Substitution to a formulary equivalent within the same pharmaceutical class is authorized  Associated Diagnoses: Hypothyroidism, unspecified type; Hypertension, unspecified type; Mild persistent asthma, unspecified whether complicated      Alphagan P 0 1 %       amLODIPine (NORVASC) 5 mg tablet Take 1 tablet (5 mg total) by mouth daily  Qty: 90 tablet, Refills: 1    Associated Diagnoses: Hypothyroidism, unspecified type;  Hypertension, unspecified type; Mild persistent asthma, unspecified whether complicated      aspirin 81 MG tablet Take 81 mg by mouth daily        Biotin 5 MG CAPS Take by mouth      brimonidine tartrate 0 2 % ophthalmic solution Administer 1 drop to both eyes 2 (two) times a day        CALCIUM PO Take by mouth      fluticasone (FLONASE) 50 mcg/act nasal spray 2 SPRAYS IN EACH NOSTRIL EVERY DAY  Qty: 16 g, Refills: 10    Associated Diagnoses: Allergic rhinitis due to pollen, unspecified seasonality      fluticasone-salmeterol (ADVAIR, WIXELA) 250-50 mcg/dose inhaler Inhale 1 puff 2 (two) times a day Rinse mouth after use  Qty: 1 Inhaler, Refills: 2    Comments: Patient requests Advair brand name  Associated Diagnoses: Mild persistent asthma, unspecified whether complicated      furosemide (LASIX) 20 mg tablet       gabapentin (NEURONTIN) 300 mg capsule Take 1 capsule at hs  Qty: 90 capsule, Refills: 1    Associated Diagnoses: Spinal stenosis of lumbar region, unspecified whether neurogenic claudication present      latanoprost (XALATAN) 0 005 % ophthalmic solution Administer 1 drop to both eyes daily at bedtime        levothyroxine 50 mcg tablet Take 1 tablet (50 mcg total) by mouth daily  Qty: 90 tablet, Refills: 1    Associated Diagnoses: Hypothyroidism, unspecified type      lisinopril (ZESTRIL) 20 mg tablet Take 1 tablet (20 mg total) by mouth daily  Qty: 90 tablet, Refills: 1    Associated Diagnoses: Hypertension, unspecified type      Mirabegron ER (Myrbetriq) 25 MG TB24 Take 25 mg by mouth daily      mometasone (ELOCON) 0 1 % cream Apply topically daily  Qty: 45 g, Refills: 0    Associated Diagnoses: Dermatitis      Omega-3 Fatty Acids (EQL OMEGA 3 FISH OIL) 1200 MG CAPS Take by mouth      oxyCODONE (ROXICODONE) 5 mg immediate release tablet Take 1 tablet (5 mg total) by mouth every 4 (four) hours as needed for moderate pain or severe painMax Daily Amount: 30 mg  Qty: 10 tablet, Refills: 0    Associated Diagnoses: Nasal polyposis;  Chronic pansinusitis      torsemide (DEMADEX) 10 mg tablet TAKE 1 TABLET (10 MG TOTAL) BY MOUTH DAILY  Qty: 30 tablet, Refills: 10    Associated Diagnoses: Heart failure, unspecified HF chronicity, unspecified heart failure type (HCC)      Vitamin D High Potency 25 MCG (1000 UT) capsule GIVE 1 CAP BY MOUTH EVERY DAY DX: SUPPLEMENT  Qty: 30 capsule, Refills: 10    Associated Diagnoses: Vitamin D deficiency             Allergies: Allergies   Allergen Reactions    Cephalexin Nausea Only    Erythromycin     Phenazopyridine     Piroxicam     Sulfamethoxazole-Trimethoprim        FOLLOW-UP     PCP Outpatient Follow-up:  Follow up with PCP in 1-2 weeks     Consulting Providers Follow-up:  Follow up with cardiology     Discharge Statement:   I spent 30 minutes minutes discharging the patient  This time was spent on the day of discharge  I had direct contact with the patient on the day of discharge  Additional documentation is required if more than 30 minutes were spent on discharge  Portions of the record may have been created with voice recognition software  Occasional wrong word or "sound a like" substitutions may have occurred due to the inherent limitations of voice recognition software    Read the chart carefully and recognize, using context, where substitutions have occurred     ==  Jolly Hanna [address] [facility]

## 2022-02-25 NOTE — PROGRESS NOTES
Cardiology Progress Note - Enriqueta Goodson 80 y o  female MRN: [id number]    [facility] Encounter: [id number]      Assessment:  1  Atrial fibrillation - presumably new onset  2  Acute respiratory failure with hypoxia secondary to #3  3  Influenza A  4  Chronic diastolic heart failure  5  Benign essential hypertension  6  Dyslipidemia  7  History of asthma  8  PAD with intermittent lower extremity claudication    Plan:  1  Heart rates controlled this AM   2  Beta-blocker increased to 75 mg BID   3  On therapeutic Lovenox - SLIM to investigate cost of Eliquis - needs 2 5 mg BID  4  Blood pressure running high - beta-blocker increased  5  Volume status stable - continue oral diuretics  6  Echo noted   7  Follow-up with [facility] cardiology post discharge, [facility] will be available - please call with questions     Subjective:   Patient seen and examined  No significant events overnight  Objective:     Vitals: Blood pressure 146/91, pulse 86, temperature 98 3 °F (36 8 °C), resp   rate 17, height 4' 9 5" (1 461 m), weight 58 5 kg (129 lb), SpO2 95 %, not currently breastfeeding , Body mass index is 27 43 kg/m² ,   Orthostatic Blood Pressures      Most Recent Value   Blood Pressure 146/91 filed at 02/25/2022 7429   Patient Position - Orthostatic VS Lying filed at 02/23/2022 1030          No intake or output data in the 24 hours ending 02/25/22 2611      Physical Exam:    GEN: Enriqueta Goodson appears well, alert and oriented x 3, pleasant and cooperative   HEENT: pupils equal, round, and reactive to light; extraocular muscles intact  NECK: supple, no carotid bruits   HEART: irregularly irregular, variable S1/S2, no murmur   LUNGS: coarse breath sounds with scattered rhonchi  ABDOMEN: normal bowel sounds, soft, no tenderness, no distention  EXTREMITIES: no edema   NEURO: no focal findings   SKIN: normal without suspicious lesions on exposed skin    Medications:      Current Facility-Administered Medications:    acetaminophen (TYLENOL) tablet 650 mg, 650 mg, Oral, Q6H PRN, Milta Halsted, DO    amLODIPine (NORVASC) tablet 5 mg, 5 mg, Oral, Daily, Milta Halsted, DO, 5 mg at 02/25/22 2930    aspirin (ECOTRIN LOW STRENGTH) EC tablet 81 mg, 81 mg, Oral, Daily, Milta Halsted, DO, 81 mg at 02/25/22 9544    benzonatate (TESSALON PERLES) capsule 100 mg, 100 mg, Oral, TID PRN, Milta Halsted, DO    brimonidine tartrate 0 2 % ophthalmic solution 1 drop, 1 drop, Both Eyes, BID, Milta Halsted, DO, 1 drop at 02/25/22 0814    calcium carbonate (OYSTER SHELL,OSCAL) 500 mg tablet 1 tablet, 1 tablet, Oral, Daily With Breakfast, Milta Halsted, DO, 1 tablet at 02/25/22 0011    cholecalciferol (VITAMIN D3) tablet 1,000 Units, 1,000 Units, Oral, Daily, Milta Halsted, DO, 1,000 Units at 02/25/22 5635    enoxaparin (LOVENOX) subcutaneous injection 60 mg, 1 mg/kg, Subcutaneous, Q12H [facility], Milta Halsted, DO, 60 mg at 02/25/22 0814    fluticasone (FLONASE) 50 mcg/act nasal spray 2 spray, 2 spray, Each Nare, Daily, Milta Halsted, DO, 2 spray at 02/25/22 0814    fluticasone-vilanterol (BREO ELLIPTA) 200-25 MCG/INH inhaler 1 puff, 1 puff, Inhalation, Daily, Milta Halsted, DO, 1 puff at 02/25/22 0810    gabapentin (NEURONTIN) capsule 300 mg, 300 mg, Oral, HS, Milta Halsted, DO, 300 mg at 02/24/22 2204    latanoprost (XALATAN) 0 005 % ophthalmic solution 1 drop, 1 drop, Both Eyes, HS, Milta Halsted, DO, 1 drop at 02/24/22 2204    levalbuterol (XOPENEX) inhalation solution 1 25 mg, 1 25 mg, Nebulization, TID, 1 25 mg at 02/25/22 0656 **AND** sodium chloride 0 9 % inhalation solution 3 mL, 3 mL, Nebulization, TID, Milta Halsted, DO, 3 mL at 02/25/22 0656    levalbuterol (XOPENEX) inhalation solution 1 25 mg, 1 25 mg, Nebulization, Q6H PRN **AND** sodium chloride 0 9 % inhalation solution 3 mL, 3 mL, Nebulization, Q6H PRN, Milta Halsted, DO    levothyroxine tablet 50 mcg, 50 mcg, Oral, Early Morning, Milta Halsted, DO, 50 mcg at 02/25/22 0811    lisinopril (ZESTRIL) tablet 20 mg, 20 mg, Oral, Daily, Ivar Kaye, DO, 20 mg at 02/25/22 5031    metoprolol (LOPRESSOR) injection 5 mg, 5 mg, Intravenous, Q8H PRN, [facility], DO    metoprolol tartrate (LOPRESSOR) tablet 75 mg, 75 mg, Oral, Q12H [facility], [facility], 75 mg at 02/25/22 2755    ondansetron (ZOFRAN) injection 4 mg, 4 mg, Intravenous, Q6H PRN, [facility],     oseltamivir (TAMIFLU) capsule 30 mg, 30 mg, Oral, Q12H [facility], [facility] DO, 30 mg at 02/25/22 1905    oxybutynin (DITROPAN-XL) 24 hr tablet 5 mg, 5 mg, Oral, Daily, [facility], 5 mg at 02/25/22 8697    Insert peripheral IV, , , Once **AND** sodium chloride (PF) 0 9 % injection 3 mL, 3 mL, Intravenous, Q12H [facility], [facility] , 3 mL at 02/24/22 0907    torsemide (DEMADEX) tablet 10 mg, 10 mg, Oral, Daily, [facility], DO, 10 mg at 02/25/22 1339     Labs & Results:        Results from last 7 days   Lab Units 02/25/22  0458 02/23/22  0129   WBC Thousand/uL 7 54 10 23*   HEMOGLOBIN g/dL 12 8 11 6   HEMATOCRIT % 39 0 35 5   PLATELETS Thousands/uL 198 224         Results from last 7 days   Lab Units 02/25/22  0458 02/24/22  0841 02/23/22  0129   POTASSIUM mmol/L 4 4 4 4 4 0   CHLORIDE mmol/L 104 104 102   CO2 mmol/L 24 26 27   BUN mg/dL 42* 36* 22   CREATININE mg/dL 0 86 1 00 0 77   CALCIUM mg/dL 9 1 8 9 9 5   ALK PHOS U/L  --   --  99   ALT U/L  --   --  27   AST U/L  --   --  27     Results from last 7 days   Lab Units 02/23/22  0129   INR  1 07   PTT seconds 29     Results from last 7 days   Lab Units 02/25/22  0458 02/24/22  0841   MAGNESIUM mg/dL 2 6 2 1       Counseling / Coordination of Care  Total floor / unit time spent today 25 minutes  Greater than 50% of total time was spent with the patient and / or family counseling and / or coordination of care

## 2022-02-25 NOTE — PROGRESS NOTES
INTERNAL MEDICINE RESIDENCY PROGRESS NOTE     Name: Alessandra Ellis   Age & Sex: 80 y o  female   MRN: [id number]  Unit/Bed#: [facility] 709-01   Encounter: [id number]  Team: SLIM    PATIENT INFORMATION     Name: Alessandra Ellis   Age & Sex: 80 y o  female   MRN: [id number]  Hospital Stay Days: 1    ASSESSMENT/PLAN     Principal Problem:    Influenza A with respiratory manifestations  Active Problems:    New onset atrial fibrillation ([address] )    Acute respiratory failure with hypoxia (HCC)    Chronic diastolic CHF (congestive heart failure) ([facility])    Asthma    Hypertension      * Influenza A with respiratory manifestations  Assessment & Plan  Pt presented with a cough, SOB with desaturation, and fever since 2/20  Influenza A PCR was positive  She reports improved cough and SOB  Her nebulizer treatments help her to feel better    -Continue Tamiflu (day 3 of 5)  -O2 supplementation as needed   -Respiratory protocol, spirometry and pulmonary toilet   -Supportive care     New onset atrial fibrillation [facility])  Assessment & Plan  Pt found to be in Afib in ED  Possibly due to influenza infection  Rates 80s-100s  K+ is 4 4 today  Magnesium is 2 6  TSH is normal   -Metoprolol increased to 75 mg bid for rate control   -Monitor potassium and magnesium levels  Goal is to have K+>4 and Mag>2  -On therapeutic Lovenox  CM is price checking on Eliquis  -Appreciate cardiology recommendations         Acute respiratory failure with hypoxia (HCC)  Assessment & Plan  Pt requiring 2L O2 via NC in setting of Influenza A infection  She reports cough and SOB that have been improving  She is on 2L O2 NC at 97% on exam    - Monitor and titrate O2 supplementation as needed  - Respiratory protocol, spirometry, and pulmonary toilet     Chronic diastolic CHF (congestive heart failure) (HCC)  Assessment & Plan  Wt Readings from Last 3 Encounters:   02/23/22 58 5 kg (129 lb)   01/10/22 58 6 kg (129 lb 3 2 oz)   12/14/21 59 9 kg (132 lb)     Pt has a history of CHF  Pt does not show signs of volume overload  -Continue home medications Furosemide and Lisinopril   -Monitor daily weights  -I/Os  -Fluid restriction  -Salt restriction   -Cardiology onboard           Asthma  Assessment & Plan  Pt had wheezing on admission in the setting of Influenza A infection  She was treated with 40 mg IV Solu-medrol and nebulizer with improvement    -Continue with home inhalers/nebulizers    -Respiratory protocol    Hypertension  Assessment & Plan  -Monitor for changes in BP  -Continue home blood pressure medications amlodipine, lisinopril, and torsemide  -Metoprolol increased  -Appreciate cardiology recommendations         Disposition: Pt on supplemental oxygen  Possible d/c today  SUBJECTIVE     Patient seen and examined  No acute events overnight  Pt is awake and alert laying on her bed  She is on 2L NC  She reports her SOB and cough are improving and she is doing well  She feels improvement with her nebulizer treatments  She has no complaints and denies cp/sob/palpitations  ROS negative  OBJECTIVE     Vitals:    22 0225 22 0656 22 0812 22 0812   BP: 136/78  146/91 146/91   Pulse: 102  86 86   Resp:       Temp: 98 3 °F (36 8 °C)      TempSrc:       SpO2: (!) 89% 96%  95%   Weight:       Height:          Temperature:   Temp (24hrs), Av 1 °F (36 7 °C), Min:97 7 °F (36 5 °C), Max:98 6 °F (37 °C)    Temperature: 98 3 °F (36 8 °C)  Intake & Output:  I/O        0701   0700  0701   0700  07 0700    P  O  360      I V  (mL/kg) 3 (0 1)      Total Intake(mL/kg) 363 (6 2)      Net +363                 Weights:        Body mass index is 27 43 kg/m²  Weight (last 2 days)     Date/Time Weight    22 1030 58 5 (129)        Physical Exam  Vitals and nursing note reviewed  Constitutional:       General: She is not in acute distress  Appearance: She is not ill-appearing or toxic-appearing     HENT:      Head: Normocephalic and atraumatic  Right Ear: External ear normal       Left Ear: External ear normal       Nose: Nose normal       Mouth/Throat:      Pharynx: Oropharynx is clear  Eyes:      General: No scleral icterus  Conjunctiva/sclera: Conjunctivae normal    Cardiovascular:      Rate and Rhythm: Normal rate  Rhythm irregular  Heart sounds: No murmur heard  No friction rub  Pulmonary:      Effort: Pulmonary effort is normal  No respiratory distress  Breath sounds: Rhonchi present  Abdominal:      General: Bowel sounds are normal       Palpations: Abdomen is soft  Tenderness: There is no abdominal tenderness  There is no guarding  Musculoskeletal:      Cervical back: Neck supple  Right lower leg: No edema  Left lower leg: No edema  Skin:     General: Skin is warm and dry  Capillary Refill: Capillary refill takes less than 2 seconds  Coloration: Skin is not jaundiced  Neurological:      Mental Status: She is alert and oriented to person, place, and time  Psychiatric:         Behavior: Behavior normal        LABORATORY DATA     Labs: I have personally reviewed pertinent reports    Results from last 7 days   Lab Units 02/25/22  0458 02/23/22  0129   WBC Thousand/uL 7 54 10 23*   HEMOGLOBIN g/dL 12 8 11 6   HEMATOCRIT % 39 0 35 5   PLATELETS Thousands/uL 198 224   NEUTROS PCT % 73 88*   MONOS PCT % 9 5      Results from last 7 days   Lab Units 02/25/22  0458 02/24/22  0841 02/23/22  0129   POTASSIUM mmol/L 4 4 4 4 4 0   CHLORIDE mmol/L 104 104 102   CO2 mmol/L 24 26 27   BUN mg/dL 42* 36* 22   CREATININE mg/dL 0 86 1 00 0 77   CALCIUM mg/dL 9 1 8 9 9 5   ALK PHOS U/L  --   --  99   ALT U/L  --   --  27   AST U/L  --   --  27     Results from last 7 days   Lab Units 02/25/22  0458 02/24/22  0841   MAGNESIUM mg/dL 2 6 2 1          Results from last 7 days   Lab Units 02/23/22  0129   INR  1 07   PTT seconds 29     Results from last 7 days   Lab Units 02/23/22  0129   LACTIC ACID mmol/L 1 6           IMAGING & DIAGNOSTIC TESTING     Radiology Results: I have personally reviewed pertinent reports  XR chest portable    Result Date: 2/23/2022  Impression: No active pulmonary disease  Workstation performed: [id number]     Other Diagnostic Testing: I have personally reviewed pertinent reports      ACTIVE MEDICATIONS     Current Facility-Administered Medications   Medication Dose Route Frequency    acetaminophen (TYLENOL) tablet 650 mg  650 mg Oral Q6H PRN    amLODIPine (NORVASC) tablet 5 mg  5 mg Oral Daily    aspirin (ECOTRIN LOW STRENGTH) EC tablet 81 mg  81 mg Oral Daily    benzonatate (TESSALON PERLES) capsule 100 mg  100 mg Oral TID PRN    brimonidine tartrate 0 2 % ophthalmic solution 1 drop  1 drop Both Eyes BID    calcium carbonate (OYSTER SHELL,OSCAL) 500 mg tablet 1 tablet  1 tablet Oral Daily With Breakfast    cholecalciferol (VITAMIN D3) tablet 1,000 Units  1,000 Units Oral Daily    enoxaparin (LOVENOX) subcutaneous injection 60 mg  1 mg/kg Subcutaneous Q12H MONICO    fluticasone (FLONASE) 50 mcg/act nasal spray 2 spray  2 spray Each Nare Daily    fluticasone-vilanterol (BREO ELLIPTA) 200-25 MCG/INH inhaler 1 puff  1 puff Inhalation Daily    gabapentin (NEURONTIN) capsule 300 mg  300 mg Oral HS    latanoprost (XALATAN) 0 005 % ophthalmic solution 1 drop  1 drop Both Eyes HS    levalbuterol (XOPENEX) inhalation solution 1 25 mg  1 25 mg Nebulization TID    And    sodium chloride 0 9 % inhalation solution 3 mL  3 mL Nebulization TID    levalbuterol (XOPENEX) inhalation solution 1 25 mg  1 25 mg Nebulization Q6H PRN    And    sodium chloride 0 9 % inhalation solution 3 mL  3 mL Nebulization Q6H PRN    levothyroxine tablet 50 mcg  50 mcg Oral Early Morning    lisinopril (ZESTRIL) tablet 20 mg  20 mg Oral Daily    metoprolol (LOPRESSOR) injection 5 mg  5 mg Intravenous Q8H PRN    metoprolol tartrate (LOPRESSOR) tablet 75 mg  75 mg Oral BID    ondansetron (ZOFRAN) injection 4 mg  4 mg Intravenous Q6H PRN    oseltamivir (TAMIFLU) capsule 30 mg  30 mg Oral Q12H [address]    oxybutynin (DITROPAN-XL) 24 hr tablet 5 mg  5 mg Oral Daily    sodium chloride (PF) 0 9 % injection 3 mL  3 mL Intravenous Q12H [address]    torsemide (DEMADEX) tablet 10 mg  10 mg Oral Daily       VTE Pharmacologic Prophylaxis: Reason for no pharmacologic prophylaxis Lovenox  VTE Mechanical Prophylaxis: sequential compression device    Portions of the record may have been created with voice recognition software  Occasional wrong word or "sound a like" substitutions may have occurred due to the inherent limitations of voice recognition software    Read the chart carefully and recognize, using context, where substitutions have occurred   ==  Jolly Hanna [address] [facility]

## 2022-02-25 NOTE — PLAN OF CARE
Problem: MOBILITY - ADULT  Goal: Maintain or return to baseline ADL function  Description: INTERVENTIONS:  -  Assess patient's ability to carry out ADLs; assess patient's baseline for ADL function and identify physical deficits which impact ability to perform ADLs (bathing, care of mouth/teeth, toileting, grooming, dressing, etc )  - Assess/evaluate cause of self-care deficits   - Assess range of motion  - Assess patient's mobility; develop plan if impaired  - Assess patient's need for assistive devices and provide as appropriate  - Encourage maximum independence but intervene and supervise when necessary  - Involve family in performance of ADLs  - Assess for home care needs following discharge   - Consider OT consult to assist with ADL evaluation and planning for discharge  - Provide patient education as appropriate  Outcome: Progressing  Goal: Maintains/Returns to pre admission functional level  Description: INTERVENTIONS:  - Perform BMAT or MOVE assessment daily    - Set and communicate daily mobility goal to care team and patient/family/caregiver  - Collaborate with rehabilitation services on mobility goals if consulted  - Perform Range of Motion 2 times a day  - Reposition patient every 2 hours    - Dangle patient 2 times a day  - Stand patient 2 times a day  - Ambulate patient 2 times a day  - Out of bed to chair 2 times a day   - Out of bed for meals 2 times a day  - Out of bed for toileting  - Record patient progress and toleration of activity level   Outcome: Progressing     Problem: Potential for Falls  Goal: Patient will remain free of falls  Description: INTERVENTIONS:  - Educate patient/family on patient safety including physical limitations  - Instruct patient to call for assistance with activity   - Consult OT/PT to assist with strengthening/mobility   - Keep Call bell within reach  - Keep bed low and locked with side rails adjusted as appropriate  - Keep care items and personal belongings within reach  - Initiate and maintain comfort rounds  - Make Fall Risk Sign visible to staff  - Offer Toileting every 2 Hours, in advance of need  - Initiate/Maintain bed alarm  - Apply yellow socks and bracelet for high fall risk patients  - Consider moving patient to room near nurses station  Outcome: Progressing

## 2022-02-25 NOTE — CASE MANAGEMENT
Case Management Assessment & Discharge Planning Note    Patient name Selina Villanueva  Location [facility] 709/[facility] 651-48 MRN [id number]  : 1930 Date 2022       Current Admission Date: 2022  Current Admission [place] A with respiratory manifestations   Patient Active Problem List    Diagnosis Date Noted    Influenza A with respiratory manifestations 2022    New onset atrial fibrillation ([facility] ) 2022    Atrial fibrillation ([facility] ) 2022    Right facial swelling 01/10/2022    Chronic diastolic CHF (congestive heart failure) ([facility] ) 01/10/2022    Continuous opioid dependence ([facility] ) 01/10/2022    Preop examination 2021    Nasal polyposis 2021    Urge incontinence of urine 2021    Lower extremity edema 2021    Dermatitis 2021    Hiatal hernia 2020    Shortness of breath 2020    Primary osteoarthritis of right knee 2020    Mixed stress and urge urinary incontinence 2020    Trigger ring finger of left hand 2019    Trigger finger, right ring finger 2019    Trigger finger, left little finger 2019    Lumbar back pain 2019    Age-related osteoporosis without current pathological fracture 2019    Cellulitis of toe of right foot 2019    Preop cardiovascular exam 2019    Carpal tunnel syndrome, left 2018    Carpal tunnel syndrome of left wrist 10/23/2018    Slow transit constipation 2018    Incomplete tear of left rotator cuff 2018    Trigger little finger of left hand 2018    Complete tear of right rotator cuff 2017    Atherosclerosis of native artery of both lower extremities with intermittent claudication (HCC) 2017    Hyperlipidemia 2017    Anemia 2015    Carotid atherosclerosis 2014    Peripheral neuropathy 2013    Disc degeneration, lumbar 2013    Lumbar canal stenosis 2013    Spondylosis of lumbar region without myelopathy or radiculopathy 11/20/2012    GERD without esophagitis 08/16/2012    Glaucoma 08/16/2012    Asthma 06/26/2012    Hypertension 06/26/2012    Hypothyroidism 06/26/2012      LOS (days): 1  Geometric Mean LOS (GMLOS) (days): 4 10  Days to GMLOS:2 8     OBJECTIVE:    Risk of Unplanned Readmission Score: 22         Current admission status: Inpatient       Preferred Pharmacy:   [address] [place] [address],[address] Floor, [address] [place] [address] [address] [address]  Phone: [phone] Fax: [phone] [facility] [place] [address] [place] [address] [address] [address] [facility] 77 [address]  Phone: [phone] Fax: [phone]    Primary Care Provider: Praneeth Hirsch DO    Primary Insurance: MEDICARE  Secondary Insurance: BLUE CROSS    ASSESSMENT:  Active Health Care Agents    There are no active Health Care Agents on file                   Readmission Root Cause  30 Day Readmission: No    Patient Information  Admitted from[de-identified] Facility  Mental Status: Alert  During Assessment patient was accompanied by: Not accompanied during assessment  Assessment information provided by[de-identified] Patient  Primary Caregiver: Self  Support Systems: Daughter  Type of Current Residence: Facility  Upon entering residence, is there a bedroom on the main floor (no further steps)?: Yes  Upon entering residence, is there a bathroom on the main floor (no further steps)?: Yes  In the last 12 months, was there a time when you were not able to pay the mortgage or rent on time?: No  In the last 12 months, how many places have you lived?: 1  In the last 12 months, was there a time when you did not have a steady place to sleep or slept in a shelter (including now)?: No  Homeless/housing insecurity resource given?: N/A  Living Arrangements: Lives Alone  Is patient a ?: No    Activities of Daily Living Prior to Admission  Functional Status: Independent  Completes ADLs independently?: Yes  Ambulates independently?: Yes  Does patient use assisted devices?: Yes  Assisted Devices (DME) used: [employer]  Does patient currently own DME?: Yes  What DME does the patient currently own?: [employer]aria[employer] Escal[employer]  Does patient have a history of Outpatient Therapy (PT/OT)?: No  Does the patient have a history of Short-Term Rehab?: Yes  Does patient have a history of HHC?: Yes  Does patient currently have [address]?: No         Patient Information Continued  Income Source: Pension/senior care  Does patient have prescription coverage?: Yes  Within the past 12 months, you worried that your food would run out before you got the money to buy more : Never true  Within the past 12 months, the food you bought just didnt last and you didnt have money to get more : Never true  Food insecurity resource given?: N/A  Does patient receive dialysis treatments?: No  Does patient have a history of substance abuse?: No  Does patient have a history of Mental Health Diagnosis?: No    PHQ 2/9 Screening   Reviewed PHQ 2/9 Depression Screening Score?: No    Means of Transportation  Means of Transport to Appts[de-identified] Family transport  In the past 12 months, has lack of transportation kept you from medical appointments or from getting medications?: No  In the past 12 months, has lack of transportation kept you from meetings, work, or from getting things needed for daily living?: No  Was application for public transport provided?: N/A        DISCHARGE DETAILS:    Discharge planning discussed with[de-identified] Patient and RN dir at [place] [address] Team discharge recommendations reviewed with patient/caregiver?: Yes  Did patient/caregiver verbalize understanding of patient care needs?: Yes  Were patient/caregiver advised of the risks associated with not following Treatment Team discharge recommendations?: Yes         [facility]         Is the patient interested in [address] at discharge?: No    DME Referral Provided  Referral made for DME?: No    Other Referral/Resources/Interventions Provided:  Interventions: Assisted Living  Referral Comments: Pt resides at [address] [place] [address]    Would you like to participate in our [facility] service program?  : No - Declined    Treatment Team Recommendation: Assisted Living  Discharge Destination Plan[de-identified] Assisted Living  Transport at Discharge : Wheelchair van  Dispatcher Contacted: No         Additional Comments: The pt is medically stable for hospital discharge on this date  KURT contacted [facility], and spoke with RN director, Jaspreet Alejo [phone]  Per Nerissa, when at her functional baseline, the pt does not require assistance with transfer or ambulation  Nerissa asked KURT to review PT assessment completed on 2/24 and it reflected the pt only ambulated 10+10ft and required min assist x1 for toilet transfer and during ambulation  As this is not the pt's functional baseline, Nerissa asked for the pt to be re-evaluated  In addition, if the pt is not able to demonstrate ability to ambulate 75-100ft independently, she cannot return to [facility]  KURT relayed this information to PT, and the pt is scheduled for re-eval on 2/26  Nerissa states she covers over the weekend, and the pt can be re-admitted pending PT follow up/clearance  D/C summary and PT/OT tx scripts and Prescriptions can be faxed to [phone]        LAZ Rodrigues, PATRICIA, CCM, ACM-SW, [facility]

## 2022-02-25 NOTE — RESTORATIVE TECHNICIAN NOTE
Restorative Technician Note      Patient Name: Caitlyn Alvarado     Note Type: Mobility  Patient Position Upon Consult: Supine  Activity Performed: Ambulated; Dangled; Stood  Assistive Device: Roller walker; Other (Comment) (NC O2 on 2L)  Education Provided: Yes  Patient Position at End of Consult: Bedside chair;  All needs within reach; Bed/Chair alarm activated    Jeimy MENDEZ, Restorative Technician, [employer]

## 2022-02-26 PROCEDURE — 94664 DEMO&/EVAL PT USE INHALER: CPT

## 2022-02-26 PROCEDURE — 97530 THERAPEUTIC ACTIVITIES: CPT

## 2022-02-26 PROCEDURE — 97535 SELF CARE MNGMENT TRAINING: CPT

## 2022-02-26 PROCEDURE — 99233 SBSQ HOSP IP/OBS HIGH 50: CPT | Performed by: INTERNAL MEDICINE

## 2022-02-26 PROCEDURE — 97116 GAIT TRAINING THERAPY: CPT

## 2022-02-26 PROCEDURE — 94640 AIRWAY INHALATION TREATMENT: CPT

## 2022-02-26 PROCEDURE — 94760 N-INVAS EAR/PLS OXIMETRY 1: CPT

## 2022-02-26 RX ADMIN — BRIMONIDINE TARTRATE 1 DROP: 2 SOLUTION OPHTHALMIC at 08:05

## 2022-02-26 RX ADMIN — LEVOTHYROXINE SODIUM 50 MCG: 50 TABLET ORAL at 08:04

## 2022-02-26 RX ADMIN — OXYBUTYNIN CHLORIDE 5 MG: 5 TABLET, EXTENDED RELEASE ORAL at 08:04

## 2022-02-26 RX ADMIN — CALCIUM 1 TABLET: 500 TABLET ORAL at 08:03

## 2022-02-26 RX ADMIN — LEVALBUTEROL HYDROCHLORIDE 1.25 MG: 1.25 SOLUTION, CONCENTRATE RESPIRATORY (INHALATION) at 13:12

## 2022-02-26 RX ADMIN — TORSEMIDE 10 MG: 20 TABLET ORAL at 09:39

## 2022-02-26 RX ADMIN — APIXABAN 2.5 MG: 2.5 TABLET, FILM COATED ORAL at 11:47

## 2022-02-26 RX ADMIN — SODIUM CHLORIDE, SODIUM LACTATE, POTASSIUM CHLORIDE, AND CALCIUM CHLORIDE 250 ML: .6; .31; .03; .02 INJECTION, SOLUTION INTRAVENOUS at 22:12

## 2022-02-26 RX ADMIN — GABAPENTIN 300 MG: 300 CAPSULE ORAL at 20:47

## 2022-02-26 RX ADMIN — OSELTAMIVIR PHOSPHATE 30 MG: 30 CAPSULE ORAL at 20:47

## 2022-02-26 RX ADMIN — FLUTICASONE PROPIONATE 2 SPRAY: 50 SPRAY, METERED NASAL at 08:05

## 2022-02-26 RX ADMIN — FLUTICASONE FUROATE AND VILANTEROL TRIFENATATE 1 PUFF: 200; 25 POWDER RESPIRATORY (INHALATION) at 08:03

## 2022-02-26 RX ADMIN — OSELTAMIVIR PHOSPHATE 30 MG: 30 CAPSULE ORAL at 08:12

## 2022-02-26 RX ADMIN — METOPROLOL TARTRATE 75 MG: 50 TABLET, FILM COATED ORAL at 09:39

## 2022-02-26 RX ADMIN — LEVALBUTEROL HYDROCHLORIDE 1.25 MG: 1.25 SOLUTION, CONCENTRATE RESPIRATORY (INHALATION) at 07:32

## 2022-02-26 RX ADMIN — ENOXAPARIN SODIUM 60 MG: 60 INJECTION SUBCUTANEOUS at 08:10

## 2022-02-26 RX ADMIN — ISODIUM CHLORIDE 3 ML: 0.03 SOLUTION RESPIRATORY (INHALATION) at 07:32

## 2022-02-26 RX ADMIN — ASPIRIN 81 MG: 81 TABLET, COATED ORAL at 08:05

## 2022-02-26 RX ADMIN — APIXABAN 2.5 MG: 2.5 TABLET, FILM COATED ORAL at 17:48

## 2022-02-26 RX ADMIN — Medication 1000 UNITS: at 08:05

## 2022-02-26 RX ADMIN — LATANOPROST 1 DROP: 50 SOLUTION OPHTHALMIC at 21:23

## 2022-02-26 RX ADMIN — ISODIUM CHLORIDE 3 ML: 0.03 SOLUTION RESPIRATORY (INHALATION) at 13:12

## 2022-02-26 RX ADMIN — LEVALBUTEROL HYDROCHLORIDE 1.25 MG: 1.25 SOLUTION, CONCENTRATE RESPIRATORY (INHALATION) at 19:59

## 2022-02-26 RX ADMIN — SODIUM CHLORIDE 3 ML: 9 INJECTION, SOLUTION INTRAMUSCULAR; INTRAVENOUS; SUBCUTANEOUS at 21:23

## 2022-02-26 RX ADMIN — ISODIUM CHLORIDE 3 ML: 0.03 SOLUTION RESPIRATORY (INHALATION) at 19:59

## 2022-02-26 RX ADMIN — BRIMONIDINE TARTRATE 1 DROP: 2 SOLUTION OPHTHALMIC at 17:49

## 2022-02-26 NOTE — PHYSICAL THERAPY NOTE
Physical Therapy treatment note     Patient Name: Caitlyn PATTERSON Date: 2/26/2022     Problem List  Principal Problem:    Influenza A with respiratory manifestations  Active Problems:    Asthma    Hypertension    Chronic diastolic CHF (congestive heart failure) ([facility] )    New onset atrial fibrillation [facility])       Past Medical History  Past Medical History:   Diagnosis Date    Anxiety     Arthritis     OA    Asthma     good control    Burn     Chronic pain disorder     spinal stenosis    GERD (gastroesophageal reflux disease)     off and on    Glaucoma     Hiatal hernia     HLD (hyperlipidemia)     Hx of fall 2015    Hypertension     PVD (peripheral vascular disease) ([facility] )     Thyroid disease     hypo        Past Surgical History  Past Surgical History:   Procedure Laterality Date    ANGIOPLASTY      jacquelyn lower legs    BLADDER SUSPENSION      with mesh    CATARACT EXTRACTION Bilateral     COLONOSCOPY      COLONOSCOPY W/ POLYPECTOMY      via Colostomy; Pt does not have colostomy - no listing for a polypectomy of colon per [employer]    DILATION AND CURETTAGE OF UTERUS      NASAL POLYP EXCISION      Nasal Endoscopy Polypectomy    OTHER SURGICAL HISTORY  04/2007    Uterine prolapse and repair     MO REVISE MEDIAN N/CARPAL TUNNEL SURG Left 1/10/2019    Procedure: RELEASE LEFT CARPAL TUNNEL;  Surgeon: Dixon Packer MD;  Location:  MAIN OR;  Service: Orthopedics         02/26/22 1421   PT Last Visit   PT Visit Date 02/26/22   Note Type   Note Type Treatment   Pain Assessment   Pain Assessment Tool 0-10   Pain Score No Pain   Restrictions/Precautions   Weight Bearing Precautions Per Order No   Other Precautions Chair Alarm; Bed Alarm; Fall Risk   General   Chart Reviewed Yes   Family/Caregiver Present Yes   Cognition   Overall Cognitive Status Impaired   Arousal/Participation Alert; Cooperative   Attention Attends with cues to redirect Orientation Level Oriented X4   Bed Mobility   Additional Comments pt OOB in chair at the begining of the session   Transfers   Sit to Stand 6  Modified independent   Stand to Sit 6  Modified independent   Ambulation/Elevation   Gait pattern Excessively slow  (SOB)   Gait Assistance 5  Supervision   Assistive Device Rolling walker   Distance [id number], 75ftx1, [id number]   Balance   Static Sitting Fair +   Dynamic Sitting Fair   Static Standing Fair -   Dynamic Standing Fair -   Ambulatory Fair -   Endurance Deficit   Endurance Deficit Yes   Activity Tolerance   Activity Tolerance Patient limited by fatigue   Nurse Made Aware nurse approved therapy session   Assessment   Prognosis Good   Problem List Decreased strength;Decreased endurance;Decreased mobility   Assessment Pt presents to therapy today with reduced mobility, poor endurance, gait abnormalitites  These impairments limit the patient by requiring rest breaks throughout session and some assistnace  Pt would benefit from continued skilled therapy while in the hospital to improve overall mobility and work towards a safe d/c  Recommend home to [facility] with home PT  At end of session patient was left seated with call bell within reach  Chair alarm on  Pt's daughter present  Goals   STG Expiration Date 03/10/22   PT Treatment Day 2   Plan   Treatment/Interventions Functional transfer training;LE strengthening/ROM; Therapeutic exercise; Endurance training;Gait training;Bed mobility; Equipment eval/education;OT   Progress Progressing toward goals   PT Frequency 3-5x/wk   Recommendation   PT Discharge Recommendation Home with home health rehabilitation  (return to [facility] with home PT)   AM-PAC Basic Mobility Inpatient   Turning in Bed Without Bedrails 3   Lying on Back to Sitting on Edge of Flat Bed 3   Moving Bed to Chair 3   Standing Up From Chair 4   Walk in Room 3   Climb 3-5 Stairs 2   Basic Mobility Inpatient Raw Score 18   Basic Mobility Standardized Score 41 05 Highest Level Of Mobility   -HLM Goal 6: Walk 10 steps or more   Lakeisha Rhodes, PT, DPT

## 2022-02-26 NOTE — PLAN OF CARE
Problem: PHYSICAL THERAPY ADULT  Goal: Performs mobility at highest level of function for planned discharge setting  See evaluation for individualized goals  Description: Treatment/Interventions: Functional transfer training,LE strengthening/ROM,Therapeutic exercise,Endurance training,Patient/family training,Equipment eval/education,Bed mobility,Gait training,Spoke to nursing          See flowsheet documentation for full assessment, interventions and recommendations  Outcome: Progressing  Note: Prognosis: Good  Problem List: Decreased strength,Decreased endurance,Decreased mobility  Assessment: Pt presents to therapy today with reduced mobility, gait abnormalities, poor endurance, risk of falling  These impairments limit the patient by requiring rest breaks throughout ambulation and places her at risk of falling  Pt would benefit from continued skilled therapy while in the hospital to improve overall mobility and work towards a safe d/c  Recommend return to [facility] with home PT  At end of session patient was left seated with call bell within reach  Barriers to Discharge: None        PT Discharge Recommendation: Home with home health rehabilitation (return to [facility] with PT)          See flowsheet documentation for full assessment

## 2022-02-26 NOTE — RESTORATIVE TECHNICIAN NOTE
Restorative Technician Note      Patient Name: Arlyn Barron     Note Type: Mobility  Patient Position Upon Consult: Bedside chair  Activity Performed: Ambulated; Dangled; Stood  Assistive Device: Roller walker; Other (Comment) (Assist x1 with chair follow  Assisted PT Leslee)  Education Provided: Yes  Patient Position at End of Consult: Bedside chair;  All needs within reach; Bed/Chair alarm activated      Flakito MENDEZ, Restorative Technician, [employer]

## 2022-02-26 NOTE — ASSESSMENT & PLAN NOTE
Present with fever, cough, shortness for breath  Requiring 3 L NC on admission to maintain appropriate saturations  Found positive for influenza A, COVID-19 negative  Procalcitonin WNL  Saturating well on room air    · Continue with Tamiflu day 4/5  · Supportive care with p r n  nebulizers as above  · Respiratory protocol, incentive spirometry, pulmonary toileting

## 2022-02-26 NOTE — OCCUPATIONAL THERAPY NOTE
Occupational Therapy Progress Note     Patient Name: Itzel Carl  ZRSCE'Z Date: 2/26/2022  Problem List  Principal Problem:    Influenza A with respiratory manifestations  Active Problems:    Asthma    Hypertension    Chronic diastolic CHF (congestive heart failure) ([facility] )    New onset atrial fibrillation ([facility] )              02/26/22 1116   OT Last Visit   OT Visit Date 02/26/22   Note Type   Note Type Treatment   Restrictions/Precautions   Weight Bearing Precautions Per Order No   Other Precautions Chair Alarm; Bed Alarm; Fall Risk;Droplet precautions;Contact/isolation  (influenza)   Lifestyle   Autonomy I with ADL's, assistance from facility with all IADL's including meals, laundry, cleaning   Reciprocal Relationships facility, daughters (2)   Service to Others retired   Intrinsic Gratification reading   Pain Assessment   Pain Assessment Tool 0-10   Pain Score No Pain   ADL   Where Assessed Chair   UB Bathing Assistance 5  Supervision/Setup   UB Bathing Deficit Supervision/safety;Setup   LB Bathing Assistance 4  Minimal Assistance   LB Bathing Deficit Buttocks; Increased time to complete;Supervision/safety;Setup   LB Bathing Comments Seated/standing   UB Dressing Assistance 5  Supervision/Setup   UB Dressing Deficit Setup;Supervision/safety; Increased time to complete; Fasteners   LB Dressing Assistance 3  Moderate Assistance   LB Dressing Deficit Thread RLE into pants; Thread LLE into pants; Thread RLE into underwear; Thread LLE into underwear; Increased time to complete;Supervision/safety;Setup   Toileting Assistance  4  Minimal Assistance   Toileting Deficit Setup; Increased time to complete;Supervison/safety  (STD-low- toilet with use of GB)   Toileting Comments Pt with raised toilet at [facility]  Pt requires min A from low toilet and would benefit from use of BSC to maximize independence  Pt min A with transfer, S with hygiene, and S with clothing mgmt  Pt would be S with toileting routine with use of BSC     Bed Mobility Supine to Sit 5  Supervision   Additional items HOB elevated; Increased time required;Verbal cues;LE management   Additional Comments Pt greeted supine in bed and left OOB in recliner chair at end of session  All needs met, call bell nearby, and chair alarm engaged  Transfers   Sit to Stand 5  Supervision   Stand to Sit 5  Supervision   Functional Mobility   Functional Mobility 5  Supervision   Additional Comments Household distances  Standing/seated rest breaks provided  Additional items Rolling walker   Cognition   Overall Cognitive Status Impaired   Arousal/Participation Alert; Cooperative   Attention Attends with cues to redirect   Orientation Level Oriented X4   Memory Decreased recall of precautions;Decreased recall of recent events;Decreased short term memory   Following Commands Follows one step commands without difficulty   Comments Pt pleasant and cooperative during OT session  Pt states she feels a bit lethargic today  Activity Tolerance   Activity Tolerance Patient tolerated treatment well   Medical Staff Made Aware RN cleared/updated  Portions of TX completed with Leslee PT 2* to Pt's medical complexity and decreased endurance  Assessment   Assessment Pt greeted bedside for OT treatment on 2/26/2022 focusing on maximizing independence with ADLs  Pt completes bed mobility with S, functional transfers with S and functional mobility at S level with RW  Pt completes toileting routine with min A and sat in chair to complete ADL routine  Pt S with UB bathing, min A with LB bathing, S with UB dressing, mod A with LB dressing  Limitations that impact functional performance include decreased ADL status, decreased UE ROM, decreased UE strength, decreased safe judgement during ADLs, decreased endurance, decreased self care transfers and decreased high level ADLs   Occupational performance areas to address ADL retraining, functional transfer training, UE strengthening/ROM, endurance training, cognitive reorientation, Pt/caregiver education, equipment evaluation/education, compensatory technique education, energy conservation and activity engagement   Pt would benefit from continued skilled OT services while in hospital to maximize independence with ADLs  Will continue to follow Pt's goals and progress  Pt would benefit from post acute rehabilitation services upon DC to maximize safety and independence with ADLs and functional tasks of choice  Plan   Treatment Interventions ADL retraining;Functional transfer training;UE strengthening/ROM; Endurance training;Cognitive reorientation;Patient/family training;Equipment evaluation/education; Compensatory technique education; Energy conservation; Activityengagement   Goal Expiration Date 03/10/22   OT Treatment Day 1   OT Frequency 3-5x/wk   Recommendation   OT Discharge Recommendation Home with home health rehabilitation  (Return to [facility] with OT services)   OT - OK to Discharge Yes   Additional Comments  The patient's raw score on the AM-PAC Daily Activity inpatient short form is 17, standardized score is 37 26, less than 39 4  Patients at this level are likely to benefit from discharge to post-acute rehabilitation services  Please refer to the recommendation of the Occupational Therapist for safe discharge planning     AM-PAC Daily Activity Inpatient   Lower Body Dressing 2   Bathing 2   Toileting 3   Upper Body Dressing 3   Grooming 3   Eating 4   Daily Activity Raw Score 17   Daily Activity Standardized Score (Calc for Raw Score >=11) 37 26   AM-PAC Applied Cognition Inpatient   Following a Speech/Presentation 3   Understanding Ordinary Conversation 4   Taking Medications 3   Remembering Where Things Are Placed or Put Away 3   Remembering List of 4-5 Errands 3   Taking Care of Complicated Tasks 2   Applied Cognition Raw Score 18   Applied Cognition Standardized Score 38 07     Minerva Phillips MS, OTR/L

## 2022-02-26 NOTE — ASSESSMENT & PLAN NOTE
Currently stable  · Continue home dose Norvasc and lisinopril   · Start metoprolol for rate control as above   · Appreciate cardiology inputs

## 2022-02-26 NOTE — ASSESSMENT & PLAN NOTE
Confirmed with EKG  ? provoked by influenza  TSH within normal limits   Echocardiogram shows EF of 70%  CHADS2-VASc 5, currently on weight based Lovenox  · Continue metoprolol 75 mg b i d  · Change anticoagulation to Eliquis 2 5 mg b i d    · Appreciate cardiology consult and recommendations   · Continue telemetry monitoring

## 2022-02-26 NOTE — PROGRESS NOTES
INTERNAL MEDICINE RESIDENCY PROGRESS NOTE     Name: Wicho Dorantes   Age & Sex: 80 y o  female   MRN: [id number]  [facility]   Encounter: [id number]  Team: BRIANNA    PATIENT INFORMATION     Name: Wicho Dorantes   Age & Sex: 80 y o  female   MRN: [id number]  Hospital Stay Days: 2    ASSESSMENT/PLAN     Principal Problem:    Influenza A with respiratory manifestations  Active Problems:    Asthma    Hypertension    Chronic diastolic CHF (congestive heart failure) ([address] )    New onset atrial fibrillation (HCC)      * Influenza A with respiratory manifestations  Assessment & Plan  Present with fever, cough, shortness for breath  Requiring 3 L NC on admission to maintain appropriate saturations  Found positive for influenza A, COVID-19 negative  Procalcitonin WNL  Saturating well on room air  · Continue with Tamiflu day 4/5  · Supportive care with p r n  nebulizers as above  · Respiratory protocol, incentive spirometry, pulmonary toileting    New onset atrial fibrillation [facility])  Assessment & Plan  Confirmed with EKG  ? provoked by influenza  TSH within normal limits   Echocardiogram shows EF of 70%  CHADS2-VASc 5, currently on weight based Lovenox  · Continue metoprolol 75 mg b i d  · Change anticoagulation to Eliquis 2 5 mg b i d  · Appreciate cardiology consult and recommendations   · Continue telemetry monitoring     Chronic diastolic CHF (congestive heart failure) (HCC)  Assessment & Plan  Wt Readings from Last 3 Encounters:   02/23/22 58 5 kg (129 lb)   01/10/22 58 6 kg (129 lb 3 2 oz)   12/14/21 59 9 kg (132 lb)     Appears euvolemic  · Continue metoprolol 75 mg b i d    · Torsemide 10 mg daily  · Salt and fluid restriction  · Monitor daily weights, I/O, volume status    Hypertension  Assessment & Plan  Currently stable  · Continue home dose Norvasc and lisinopril   · Start metoprolol for rate control as above   · Appreciate cardiology inputs     Asthma  Assessment & Plan  Wheezing noted on admission, suspect triggered by influenza A infection  Received one time dose 40 mg IV Solu-Medrol and nebulizers in ED with improvement   · Continue with Xopenex p r n , Tori Sero  · Monitor respiratory status    Acute respiratory failure with hypoxia (HCC)-resolved as of 2022  Assessment & Plan  · Mostly likely due to influenza A infection, see plan above  · CXR without acute cardiopulmonary disease   · Currently requiring 3 L NC with SpO2 mis-90s at rest   · Monitor and titrate supplemental oxygen as needed   · Respiratory protocol      Called patient's daughter Connie Blanton and updated on patient's status  All questions answered  Disposition:  Pending physical therapy re-evaluation for possible need for post acute rehab  Medically stable  SUBJECTIVE     Patient seen and examined  No acute events overnight  Patient reports some intermittent shortness of breath  Reportedly only walk 20 ft with physical therapy yesterday  Feels generally weak  Denies any chest pain, shortness of breath currently, nausea, vomiting  Tolerating p o  Diet  Rest of ROS was negative  OBJECTIVE     Vitals:    22 0736 22 0809 22 0812 22 0938   BP: 127/88 (!) 154/106 119/71 119/71   Pulse: (!) 116 (!) 117 82 (!) 122   Resp: 22      Temp: (!) 96 5 °F (35 8 °C)      TempSrc:       SpO2: 99% 93% 94% 93%   Weight:       Height:          Temperature:   Temp (24hrs), Av 4 °F (36 3 °C), Min:96 5 °F (35 8 °C), Max:98 1 °F (36 7 °C)    Temperature: (!) 96 5 °F (35 8 °C)  Intake & Output:  I/O        07 07 07 07 07 0700    P  O    180    I V  (mL/kg)       Total Intake(mL/kg)   180 (3 1)    Net   +180               Weights:        Body mass index is 27 43 kg/m²  Weight (last 2 days)     None        Physical Exam  Vitals and nursing note reviewed  Constitutional:       General: She is not in acute distress  Appearance: Normal appearance  She is well-developed   She is obese  She is not ill-appearing  HENT:      Head: Normocephalic and atraumatic  Eyes:      Conjunctiva/sclera: Conjunctivae normal       Pupils: Pupils are equal, round, and reactive to light  Cardiovascular:      Rate and Rhythm: Normal rate and regular rhythm  Pulses: Normal pulses  Heart sounds: Normal heart sounds  No murmur heard  No friction rub  No gallop  Pulmonary:      Effort: Pulmonary effort is normal  No respiratory distress  Breath sounds: Examination of the right-lower field reveals rhonchi  Examination of the left-lower field reveals rhonchi  Rhonchi present  No wheezing or rales  Abdominal:      General: Abdomen is flat  Bowel sounds are normal  There is no distension  Palpations: Abdomen is soft  Tenderness: There is no abdominal tenderness  There is no guarding  Musculoskeletal:      Cervical back: Neck supple  Right lower leg: No edema  Left lower leg: No edema  Skin:     General: Skin is warm and dry  Neurological:      General: No focal deficit present  Mental Status: She is alert and oriented to person, place, and time  Psychiatric:         Mood and Affect: Mood normal          Behavior: Behavior normal        LABORATORY DATA     Labs: I have personally reviewed pertinent reports    Results from last 7 days   Lab Units 02/25/22 0458 02/23/22  0129   WBC Thousand/uL 7 54 10 23*   HEMOGLOBIN g/dL 12 8 11 6   HEMATOCRIT % 39 0 35 5   PLATELETS Thousands/uL 198 224   NEUTROS PCT % 73 88*   MONOS PCT % 9 5      Results from last 7 days   Lab Units 02/25/22 0458 02/24/22  0841 02/23/22  0129   POTASSIUM mmol/L 4 4 4 4 4 0   CHLORIDE mmol/L 104 104 102   CO2 mmol/L 24 26 27   BUN mg/dL 42* 36* 22   CREATININE mg/dL 0 86 1 00 0 77   CALCIUM mg/dL 9 1 8 9 9 5   ALK PHOS U/L  --   --  99   ALT U/L  --   --  27   AST U/L  --   --  27     Results from last 7 days   Lab Units 02/25/22 0458 02/24/22  0841   MAGNESIUM mg/dL 2 6 2 1 Results from last 7 days   Lab Units 02/23/22  0129   INR  1 07   PTT seconds 29     Results from last 7 days   Lab Units 02/23/22  0129   LACTIC ACID mmol/L 1 6           IMAGING & DIAGNOSTIC TESTING     Radiology Results: I have personally reviewed pertinent reports  XR chest portable    Result Date: 2/23/2022  Impression: No active pulmonary disease  Workstation performed: [id number]     Other Diagnostic Testing: I have personally reviewed pertinent reports      ACTIVE MEDICATIONS     Current Facility-Administered Medications   Medication Dose Route Frequency    acetaminophen (TYLENOL) tablet 650 mg  650 mg Oral Q6H PRN    amLODIPine (NORVASC) tablet 5 mg  5 mg Oral Daily    apixaban (ELIQUIS) tablet 2 5 mg  2 5 mg Oral BID    aspirin (ECOTRIN LOW STRENGTH) EC tablet 81 mg  81 mg Oral Daily    benzonatate (TESSALON PERLES) capsule 100 mg  100 mg Oral TID PRN    brimonidine tartrate 0 2 % ophthalmic solution 1 drop  1 drop Both Eyes BID    calcium carbonate (OYSTER SHELL,OSCAL) 500 mg tablet 1 tablet  1 tablet Oral Daily With Breakfast    cholecalciferol (VITAMIN D3) tablet 1,000 Units  1,000 Units Oral Daily    fluticasone (FLONASE) 50 mcg/act nasal spray 2 spray  2 spray Each Nare Daily    fluticasone-vilanterol (BREO ELLIPTA) 200-25 MCG/INH inhaler 1 puff  1 puff Inhalation Daily    gabapentin (NEURONTIN) capsule 300 mg  300 mg Oral HS    latanoprost (XALATAN) 0 005 % ophthalmic solution 1 drop  1 drop Both Eyes HS    levalbuterol (XOPENEX) inhalation solution 1 25 mg  1 25 mg Nebulization TID    And    sodium chloride 0 9 % inhalation solution 3 mL  3 mL Nebulization TID    levalbuterol (XOPENEX) inhalation solution 1 25 mg  1 25 mg Nebulization Q6H PRN    And    sodium chloride 0 9 % inhalation solution 3 mL  3 mL Nebulization Q6H PRN    levothyroxine tablet 50 mcg  50 mcg Oral Early Morning    lisinopril (ZESTRIL) tablet 20 mg  20 mg Oral Daily    metoprolol (LOPRESSOR) injection 5 mg 5 mg Intravenous Q8H PRN    metoprolol tartrate (LOPRESSOR) tablet 75 mg  75 mg Oral BID    ondansetron (ZOFRAN) injection 4 mg  4 mg Intravenous Q6H PRN    oseltamivir (TAMIFLU) capsule 30 mg  30 mg Oral Q12H [facility]    oxybutynin (DITROPAN-XL) 24 hr tablet 5 mg  5 mg Oral Daily    sodium chloride (PF) 0 9 % injection 3 mL  3 mL Intravenous Q12H [facility]    torsemide (DEMADEX) tablet 10 mg  10 mg Oral Daily       VTE Pharmacologic Prophylaxis: Reason for no pharmacologic prophylaxis Eliquis  VTE Mechanical Prophylaxis: sequential compression device    Portions of the record may have been created with voice recognition software  Occasional wrong word or "sound a like" substitutions may have occurred due to the inherent limitations of voice recognition software    Read the chart carefully and recognize, using context, where substitutions have occurred   ==  Alee Morgan, [address] [facility]  Internal Medicine Residency PGY-3

## 2022-02-26 NOTE — RESTORATIVE TECHNICIAN NOTE
Restorative Technician Note      Patient Name: Arlyn Barron     Note Type: Mobility  Patient Position Upon Consult: Bedside chair  Activity Performed: Ambulated; Stood; Dangled  Assistive Device: Roller walker  Education Provided: Yes  Patient Position at [employer] of Consult: Bedside chair;  All needs within reach; Bed/Chair alarm activated      Flakito MENDEZ, Restorative Technician, [employer]

## 2022-02-26 NOTE — PLAN OF CARE
Problem: OCCUPATIONAL THERAPY ADULT  Goal: Performs self-care activities at highest level of function for planned discharge setting  See evaluation for individualized goals  Description: Treatment Interventions: ADL retraining,Functional transfer training,UE strengthening/ROM,Endurance training,Cognitive reorientation,Patient/family training,Equipment evaluation/education,Compensatory technique education,Energy conservation,Activityengagement  Equipment Recommended:  (No DME needs)       See flowsheet documentation for full assessment, interventions and recommendations  2/26/2022 1457 by Melissa Marquez OT  Outcome: Progressing  Note: Limitation: Decreased ADL status,Decreased endurance,Decreased self-care trans,Decreased high-level ADLs,Decreased Safe judgement during ADL  Prognosis: Fair  Assessment: Pt greeted bedside for OT treatment on 2/26/2022 focusing on maximizing independence with ADLs  Pt completes bed mobility with S, functional transfers with S and functional mobility at S level with RW  Pt completes toileting routine with min A and sat in chair to complete ADL routine  Pt S with UB bathing, min A with LB bathing, S with UB dressing, mod A with LB dressing  Limitations that impact functional performance include decreased ADL status, decreased UE ROM, decreased UE strength, decreased safe judgement during ADLs, decreased endurance, decreased self care transfers and decreased high level ADLs  Occupational performance areas to address ADL retraining, functional transfer training, UE strengthening/ROM, endurance training, cognitive reorientation, Pt/caregiver education, equipment evaluation/education, compensatory technique education, energy conservation and activity engagement   Pt would benefit from continued skilled OT services while in hospital to maximize independence with ADLs  Will continue to follow Pt's goals and progress   Pt would benefit from post acute rehabilitation services upon DC to maximize safety and independence with ADLs and functional tasks of choice  OT Discharge Recommendation: Home with home health rehabilitation (Return to [facility] with OT services)  OT - OK to Discharge: Yes    2/26/2022 1457 by Nehal Zuñiga OT  Note: Limitation: Decreased ADL status,Decreased endurance,Decreased self-care trans,Decreased high-level ADLs,Decreased Safe judgement during ADL  Prognosis: Fair  Assessment: Pt greeted bedside for OT treatment on 2/26/2022 focusing on maximizing independence with ADLs  Pt completes bed mobility with S, functional transfers with S and functional mobility at S level with RW  Pt completes toileting routine with min A and sat in chair to complete ADL routine  Pt S with UB bathing, min A with LB bathing, S with UB dressing, mod A with LB dressing  Limitations that impact functional performance include decreased ADL status, decreased UE ROM, decreased UE strength, decreased safe judgement during ADLs, decreased endurance, decreased self care transfers and decreased high level ADLs  Occupational performance areas to address ADL retraining, functional transfer training, UE strengthening/ROM, endurance training, cognitive reorientation, Pt/caregiver education, equipment evaluation/education, compensatory technique education, energy conservation and activity engagement   Pt would benefit from continued skilled OT services while in hospital to maximize independence with ADLs  Will continue to follow Pt's goals and progress  Pt would benefit from post acute rehabilitation services upon DC to maximize safety and independence with ADLs and functional tasks of choice  OT Discharge Recommendation: Home with home health rehabilitation (Return to [facility] with OT services)  OT - OK to Discharge:  Yes

## 2022-02-26 NOTE — PHYSICAL THERAPY NOTE
Physical Therapy treatment note     Patient Name: Emily Walton    DRFYR'D Date: 2/26/2022     Problem List  Principal Problem:    Influenza A with respiratory manifestations  Active Problems:    Asthma    Hypertension    Chronic diastolic CHF (congestive heart failure) ([facility] )    New onset atrial fibrillation [facility])       Past Medical History  Past Medical History:   Diagnosis Date    Anxiety     Arthritis     OA    Asthma     good control    Burn     Chronic pain disorder     spinal stenosis    GERD (gastroesophageal reflux disease)     off and on    Glaucoma     Hiatal hernia     HLD (hyperlipidemia)     Hx of fall 2015    Hypertension     PVD (peripheral vascular disease) ([facility] )     Thyroid disease     hypo        Past Surgical History  Past Surgical History:   Procedure Laterality Date    ANGIOPLASTY      jacquelyn lower legs    BLADDER SUSPENSION      with mesh    CATARACT EXTRACTION Bilateral     COLONOSCOPY      COLONOSCOPY W/ POLYPECTOMY      via Colostomy; Pt does not have colostomy - no listing for a polypectomy of colon per [employer]    DILATION AND CURETTAGE OF UTERUS      NASAL POLYP EXCISION      Nasal Endoscopy Polypectomy    OTHER SURGICAL HISTORY  04/2007    Uterine prolapse and repair     MN REVISE MEDIAN N/CARPAL TUNNEL SURG Left 1/10/2019    Procedure: RELEASE LEFT CARPAL TUNNEL;  Surgeon: Judith Carrasquillo MD;  Location:  MAIN OR;  Service: Orthopedics      02/26/22 1117   PT Last Visit   PT Visit Date 02/26/22   Note Type   Note Type Treatment   Pain Assessment   Pain Assessment Tool 0-10   Pain Score No Pain   Restrictions/Precautions   Weight Bearing Precautions Per Order No   Other Precautions Chair Alarm; Bed Alarm; Fall Risk   General   Chart Reviewed Yes   Family/Caregiver Present No   Cognition   Overall Cognitive Status Impaired   Arousal/Participation Alert; Cooperative   Attention Attends with cues to redirect   Orientation Level Oriented X4   Bed Mobility   Additional Comments pt OOB in chair at the begining of the session   Transfers   Sit to Stand 5  Supervision   Stand to Sit 5  Supervision   Ambulation/Elevation   Gait pattern Excessively slow; Foward flexed  (SOB)   Gait Assistance 5  Supervision   Assistive Device Rolling walker   Distance [id number], [id number], seated rest break post ambulation   Balance   Static Sitting Fair +   Dynamic Sitting Fair   Static Standing Fair -   Dynamic Standing Fair -   Ambulatory Fair -   Endurance Deficit   Endurance Deficit Yes   Activity Tolerance   Activity Tolerance Patient limited by fatigue   Medical Staff Made Aware OT   Nurse Made Aware nurse approved therapy sessions   Assessment   Prognosis Good   Problem List Decreased strength;Decreased endurance;Decreased mobility   Assessment Pt presents to therapy today with reduced mobility, gait abnormalities, poor endurance, risk of falling  These impairments limit the patient by requiring rest breaks throughout ambulation and places her at risk of falling  Pt would benefit from continued skilled therapy while in the hospital to improve overall mobility and work towards a safe d/c  Recommend return to [facility] with home PT  At end of session patient was left seated with call bell within reach  Goals   STG Expiration Date 03/10/22   PT Treatment Day 1   Plan   Treatment/Interventions Functional transfer training;LE strengthening/ROM; Endurance training; Therapeutic exercise;Gait training;Bed mobility; Equipment eval/education;OT   Progress Progressing toward goals   PT Frequency 3-5x/wk   Recommendation   PT Discharge Recommendation Home with home health rehabilitation  (return to [facility] with PT)   AM-PAC Basic Mobility Inpatient   Turning in Bed Without Bedrails 3   Lying on Back to Sitting on Edge of Flat Bed 3   Moving Bed to Chair 3   Standing Up From Chair 3   Walk in Room 3   Climb 3-5 Stairs 2   Basic Mobility Inpatient Raw Score 17   Basic Mobility Standardized Score 39 67   Highest Level Of Mobility   -[facility] Goal 5: Stand one or more mins   Amanda Ellison, PT, DPT

## 2022-02-26 NOTE — PLAN OF CARE
Problem: PHYSICAL THERAPY ADULT  Goal: Performs mobility at highest level of function for planned discharge setting  See evaluation for individualized goals  Description: Treatment/Interventions: Functional transfer training,LE strengthening/ROM,Therapeutic exercise,Endurance training,Patient/family training,Equipment eval/education,Bed mobility,Gait training,Spoke to nursing          See flowsheet documentation for full assessment, interventions and recommendations  2/26/2022 1444 by Amanda Ellison PT  Outcome: Progressing  Note: Prognosis: Good  Problem List: Decreased strength,Decreased endurance,Decreased mobility  Assessment: Pt presents to therapy today with reduced mobility, poor endurance, gait abnormalitites  These impairments limit the patient by requiring rest breaks throughout session and some assistnace  Pt would benefit from continued skilled therapy while in the hospital to improve overall mobility and work towards a safe d/c  Recommend home to [facility] with home PT  At end of session patient was left seated with call bell within reach  Chair alarm on  Pt's daughter present  Barriers to Discharge: None        PT Discharge Recommendation: Home with home health rehabilitation (return to [facility] with home PT)          See flowsheet documentation for full assessment

## 2022-02-27 VITALS
OXYGEN SATURATION: 95 % | TEMPERATURE: 98.8 F | RESPIRATION RATE: 18 BRPM | HEART RATE: 69 BPM | DIASTOLIC BLOOD PRESSURE: 85 MMHG | WEIGHT: 129 LBS | HEIGHT: 58 IN | BODY MASS INDEX: 27.08 KG/M2 | SYSTOLIC BLOOD PRESSURE: 131 MMHG

## 2022-02-27 LAB
ANION GAP SERPL CALCULATED.3IONS-SCNC: 7 MMOL/L (ref 4–13)
BUN SERPL-MCNC: 49 MG/DL (ref 5–25)
CALCIUM SERPL-MCNC: 8.7 MG/DL (ref 8.3–10.1)
CHLORIDE SERPL-SCNC: 104 MMOL/L (ref 100–108)
CO2 SERPL-SCNC: 26 MMOL/L (ref 21–32)
CREAT SERPL-MCNC: 1.17 MG/DL (ref 0.6–1.3)
GFR SERPL CREATININE-BSD FRML MDRD: 40 ML/MIN/1.73SQ M
GLUCOSE SERPL-MCNC: 94 MG/DL (ref 65–140)
POTASSIUM SERPL-SCNC: 4.8 MMOL/L (ref 3.5–5.3)
SODIUM SERPL-SCNC: 137 MMOL/L (ref 136–145)

## 2022-02-27 PROCEDURE — 94760 N-INVAS EAR/PLS OXIMETRY 1: CPT

## 2022-02-27 PROCEDURE — 99239 HOSP IP/OBS DSCHRG MGMT >30: CPT | Performed by: INTERNAL MEDICINE

## 2022-02-27 PROCEDURE — 94640 AIRWAY INHALATION TREATMENT: CPT

## 2022-02-27 PROCEDURE — 80048 BASIC METABOLIC PNL TOTAL CA: CPT | Performed by: STUDENT IN AN ORGANIZED HEALTH CARE EDUCATION/TRAINING PROGRAM

## 2022-02-27 RX ORDER — OSELTAMIVIR PHOSPHATE 30 MG/1
30 CAPSULE ORAL EVERY 12 HOURS SCHEDULED
Qty: 4 CAPSULE | Refills: 0
Start: 2022-02-27 | End: 2022-03-01

## 2022-02-27 RX ORDER — METOPROLOL TARTRATE 75 MG/1
75 TABLET, FILM COATED ORAL 2 TIMES DAILY
Qty: 180 TABLET | Refills: 0 | Status: SHIPPED | OUTPATIENT
Start: 2022-02-27 | End: 2022-02-27 | Stop reason: HOSPADM

## 2022-02-27 RX ORDER — OSELTAMIVIR PHOSPHATE 30 MG/1
30 CAPSULE ORAL EVERY 12 HOURS SCHEDULED
Qty: 4 CAPSULE | Refills: 0 | Status: SHIPPED | OUTPATIENT
Start: 2022-02-27 | End: 2022-02-27

## 2022-02-27 RX ADMIN — APIXABAN 2.5 MG: 2.5 TABLET, FILM COATED ORAL at 08:18

## 2022-02-27 RX ADMIN — SODIUM CHLORIDE 3 ML: 9 INJECTION, SOLUTION INTRAMUSCULAR; INTRAVENOUS; SUBCUTANEOUS at 08:20

## 2022-02-27 RX ADMIN — TORSEMIDE 10 MG: 20 TABLET ORAL at 08:16

## 2022-02-27 RX ADMIN — ISODIUM CHLORIDE 3 ML: 0.03 SOLUTION RESPIRATORY (INHALATION) at 07:50

## 2022-02-27 RX ADMIN — OXYBUTYNIN CHLORIDE 5 MG: 5 TABLET, EXTENDED RELEASE ORAL at 08:17

## 2022-02-27 RX ADMIN — BRIMONIDINE TARTRATE 1 DROP: 2 SOLUTION OPHTHALMIC at 08:19

## 2022-02-27 RX ADMIN — FLUTICASONE FUROATE AND VILANTEROL TRIFENATATE 1 PUFF: 200; 25 POWDER RESPIRATORY (INHALATION) at 08:19

## 2022-02-27 RX ADMIN — FLUTICASONE PROPIONATE 2 SPRAY: 50 SPRAY, METERED NASAL at 08:19

## 2022-02-27 RX ADMIN — ASPIRIN 81 MG: 81 TABLET, COATED ORAL at 08:11

## 2022-02-27 RX ADMIN — OSELTAMIVIR PHOSPHATE 30 MG: 30 CAPSULE ORAL at 08:19

## 2022-02-27 RX ADMIN — CALCIUM 1 TABLET: 500 TABLET ORAL at 08:11

## 2022-02-27 RX ADMIN — ISODIUM CHLORIDE 3 ML: 0.03 SOLUTION RESPIRATORY (INHALATION) at 13:28

## 2022-02-27 RX ADMIN — Medication 1000 UNITS: at 08:18

## 2022-02-27 RX ADMIN — LEVALBUTEROL HYDROCHLORIDE 1.25 MG: 1.25 SOLUTION, CONCENTRATE RESPIRATORY (INHALATION) at 07:50

## 2022-02-27 RX ADMIN — LEVALBUTEROL HYDROCHLORIDE 1.25 MG: 1.25 SOLUTION, CONCENTRATE RESPIRATORY (INHALATION) at 13:28

## 2022-02-27 RX ADMIN — METOPROLOL TARTRATE 75 MG: 50 TABLET, FILM COATED ORAL at 08:17

## 2022-02-27 RX ADMIN — LEVOTHYROXINE SODIUM 50 MCG: 50 TABLET ORAL at 08:17

## 2022-02-27 NOTE — RESTORATIVE TECHNICIAN NOTE
Restorative Technician Note      Patient Name: Jael Mclaughlin     Note Type: Mobility  Patient Position Upon Consult: Bedside chair  Activity Performed: Ambulated; Dangled; Stood  Assistive Device: Roller walker  Education Provided: Yes  Patient Position at [employer] of Consult: Bedside chair;  All needs within reach; Bed/Chair alarm activated    Misha MENDEZ, Restorative Technician, [employer]

## 2022-02-27 NOTE — CASE MANAGEMENT
Case Management Discharge Planning Note    Patient name Jeremy Chavarria  Location [facility] 709/[facility] 851-00 MRN [id number]  : 1930 Date 2022       Current Admission Date: 2022  Current Admission [facility] [address] A with respiratory manifestations   Patient Active Problem List    Diagnosis Date Noted    Influenza A with respiratory manifestations 2022    New onset atrial fibrillation ([facility] ) 2022    Atrial fibrillation ([facility] ) 2022    Right facial swelling 01/10/2022    Chronic diastolic CHF (congestive heart failure) ([facility] ) 01/10/2022    Continuous opioid dependence ([facility] ) 01/10/2022    Preop examination 2021    Nasal polyposis 2021    Urge incontinence of urine 2021    Lower extremity edema 2021    Dermatitis 2021    Hiatal hernia 2020    Shortness of breath 2020    Primary osteoarthritis of right knee 2020    Mixed stress and urge urinary incontinence 2020    Trigger ring finger of left hand 2019    Trigger finger, right ring finger 2019    Trigger finger, left little finger 2019    Lumbar back pain 2019    Age-related osteoporosis without current pathological fracture 2019    Cellulitis of toe of right foot 2019    Preop cardiovascular exam 2019    Carpal tunnel syndrome, left 2018    Carpal tunnel syndrome of left wrist 10/23/2018    Slow transit constipation 2018    Incomplete tear of left rotator cuff 2018    Trigger little finger of left hand 2018    Complete tear of right rotator cuff 2017    Atherosclerosis of native artery of both lower extremities with intermittent claudication (HCC) 2017    Hyperlipidemia 2017    Anemia 2015    Carotid atherosclerosis 2014    Peripheral neuropathy 2013    Disc degeneration, lumbar 2013    Lumbar canal stenosis 2013    Spondylosis of lumbar region without myelopathy or radiculopathy 11/20/2012    GERD without esophagitis 08/16/2012    Glaucoma 08/16/2012    Asthma 06/26/2012    Hypertension 06/26/2012    Hypothyroidism 06/26/2012      LOS (days): 3  Geometric Mean LOS (GMLOS) (days): 4 10  Days to GMLOS:1 1     OBJECTIVE:  Risk of Unplanned Readmission Score: 23         Current admission status: Inpatient   Preferred Pharmacy:   [address] [place] [address],[address], [address] [place] [address] [address] [address]  Phone: [phone] Fax: [phone] [facility] [place] [address] 210 [facility]  Phone: [phone] Fax: [phone]    Primary Care Provider: Marilou Maxwell DO    Primary Insurance: MEDICARE  Secondary Insurance: BLUE CROSS    DISCHARGE DETAILS:    Contacts  Patient Contacts: Riana Mckeon (Daughter)  Relationship to Patient[de-identified] Family  Contact Method: Phone  Phone Number: [phone]    Transport at Discharge : [facility]  Dispatcher Contacted: Yes  Number/Name of Dispatcher: JAM MADRIGAL     ETA of Transport (Date): 02/27/22  ETA of Transport (Time): 1400    Additional Comments: CM informed bedside RN, Pt, [facility], and [facility] of transport time  D/C envelope provided to bedside RN

## 2022-02-27 NOTE — RESTORATIVE TECHNICIAN NOTE
Restorative Technician Note      Patient Name: Sheila Jiang     Note Type: Mobility  Patient Position Upon Consult: Supine  Activity Performed: Ambulated; Dangled; Stood  Assistive Device: Roller walker  Education Provided: Yes  Patient Position at [employer] of Consult: Bedside chair;  All needs within reach; Bed/Chair alarm activated        Erika MENDEZ, Restorative Technician, [employer]

## 2022-02-27 NOTE — CASE MANAGEMENT
Case Management Discharge Planning Note    Patient name Emily Walton  Location [facility] 709/[facility] 243-49 MRN [id number]  : 1930 Date 2022       Current Admission Date: 2022  Current Admission [facility] [address] A with respiratory manifestations   Patient Active Problem List    Diagnosis Date Noted    Influenza A with respiratory manifestations 2022    New onset atrial fibrillation ([facility] ) 2022    Atrial fibrillation ([facility] ) 2022    Right facial swelling 01/10/2022    Chronic diastolic CHF (congestive heart failure) ([facility] ) 01/10/2022    Continuous opioid dependence ([facility] ) 01/10/2022    Preop examination 2021    Nasal polyposis 2021    Urge incontinence of urine 2021    Lower extremity edema 2021    Dermatitis 2021    Hiatal hernia 2020    Shortness of breath 2020    Primary osteoarthritis of right knee 2020    Mixed stress and urge urinary incontinence 2020    Trigger ring finger of left hand 2019    Trigger finger, right ring finger 2019    Trigger finger, left little finger 2019    Lumbar back pain 2019    Age-related osteoporosis without current pathological fracture 2019    Cellulitis of toe of right foot 2019    Preop cardiovascular exam 2019    Carpal tunnel syndrome, left 2018    Carpal tunnel syndrome of left wrist 10/23/2018    Slow transit constipation 2018    Incomplete tear of left rotator cuff 2018    Trigger little finger of left hand 2018    Complete tear of right rotator cuff 2017    Atherosclerosis of native artery of both lower extremities with intermittent claudication (HCC) 2017    Hyperlipidemia 2017    Anemia 2015    Carotid atherosclerosis 2014    Peripheral neuropathy 2013    Disc degeneration, lumbar 2013    Lumbar canal stenosis 2013    Spondylosis of lumbar region without myelopathy or radiculopathy 11/20/2012    GERD without esophagitis 08/16/2012    Glaucoma 08/16/2012    Asthma 06/26/2012    Hypertension 06/26/2012    Hypothyroidism 06/26/2012      LOS (days): 3  Geometric Mean LOS (GMLOS) (days): 4 10  Days to GMLOS:1 1     OBJECTIVE:  Risk of Unplanned Readmission Score: 23         Current admission status: Inpatient   Preferred Pharmacy:   [address] [place] [address],[address] Floor, [address] [place] [address] [address] [address]  Phone: [phone] Fax: [phone] [facility] [place] [address] 210 [facility]  Phone: [phone] Fax: [phone]    Primary Care Provider: Lena Kim DO    Primary Insurance: MEDICARE  Secondary Insurance: BLUE CROSS    DISCHARGE DETAILS:    Contacts  Patient Contacts: Queen Leonardo (Daughter)  Relationship to Patient[de-identified] Family  Contact Method: Phone  Phone Number: [phone]  Reason/Outcome: Discharge Planning    Other Referral/Resources/Interventions Provided:  Interventions: Assisted Living,Facility Return  Referral Comments: Per rounds with Dr Esme Joseph, pt is cleared for d/c back to [facility] today  TC arthur Multani: Nursing Supervisor w/ [facility] in order to inform of same  Updated therapy notes reviewed with [facility] as requested  [facility] able to accept pt back today with [facility] therapy  [facility] to make arrangements for [facility] upon d/c  AVS and PT/OT scripts faxed to [facility] (F: [phone]) as requested  Pt will require [facility] transport upon d/c  Pt and daughter informed of tentative cost for [facility] transport, both in agreement with same      Treatment Team Recommendation: Assisted Living  Discharge Destination Plan[de-identified] Assisted Living  Transport at Discharge : [facility] Name, [address] : [facility] [address]  Receiving Facility/Agency Phone Number: [phone] ([address] [place] [address])  Facility/Agency Fax Number: [phone]

## 2022-02-27 NOTE — PLAN OF CARE
Problem: MOBILITY - ADULT  Goal: Maintain or return to baseline ADL function  Description: INTERVENTIONS:  -  Assess patient's ability to carry out ADLs; assess patient's baseline for ADL function and identify physical deficits which impact ability to perform ADLs (bathing, care of mouth/teeth, toileting, grooming, dressing, etc )  - Assess/evaluate cause of self-care deficits   - Assess range of motion  - Assess patient's mobility; develop plan if impaired  - Assess patient's need for assistive devices and provide as appropriate  - Encourage maximum independence but intervene and supervise when necessary  - Involve family in performance of ADLs  - Assess for home care needs following discharge   - Consider OT consult to assist with ADL evaluation and planning for discharge  - Provide patient education as appropriate  Outcome: Progressing  Goal: Maintains/Returns to pre admission functional level  Description: INTERVENTIONS:  - Perform BMAT or MOVE assessment daily    - Set and communicate daily mobility goal to care team and patient/family/caregiver  - Collaborate with rehabilitation services on mobility goals if consulted  - Perform Range of Motion  times a day  - Reposition patient every hours    - Dangle patient  times a day  - Stand patient  times a day  - Ambulate patient  times a day  - Out of bed to chair times a day   - Out of bed for meals times a day  - Out of bed for toileting  - Record patient progress and toleration of activity level   Outcome: Progressing     Problem: Potential for Falls  Goal: Patient will remain free of falls  Description: INTERVENTIONS:  - Educate patient/family on patient safety including physical limitations  - Instruct patient to call for assistance with activity   - Consult OT/PT to assist with strengthening/mobility   - Keep Call bell within reach  - Keep bed low and locked with side rails adjusted as appropriate  - Keep care items and personal belongings within reach  - Initiate and maintain comfort rounds  - Make Fall Risk Sign visible to staff  - Offer Toileting every  Hours, in advance of need  - Initiate/Maintain alarm  - Obtain necessary fall risk management equipment:   - Apply yellow socks and bracelet for high fall risk patients  - Consider moving patient to room near nurses station  Outcome: Progressing

## 2022-02-28 ENCOUNTER — TRANSITIONAL CARE MANAGEMENT (OUTPATIENT)
Dept: FAMILY MEDICINE CLINIC | Facility: CLINIC | Age: 87
End: 2022-02-28

## 2022-02-28 LAB
BACTERIA BLD CULT: NORMAL
BACTERIA BLD CULT: NORMAL

## 2022-03-04 ENCOUNTER — TELEPHONE (OUTPATIENT)
Dept: FAMILY MEDICINE CLINIC | Facility: CLINIC | Age: 87
End: 2022-03-04

## 2022-03-04 NOTE — TELEPHONE ENCOUNTER
PATIENT CALLED SHE SAID SHE JUST GOT OUT OF THE HOSPITAL AND IS HAVING A HARD TIME BREATHING AND WOULD LIKE TO KNOW IF YOU COULD CALL IN A NEBULIZER

## 2022-03-04 NOTE — TELEPHONE ENCOUNTER
Albuterol sent into [facility], faxed order for Nebulizer and supplies to [facility] @ [phone]

## 2022-03-07 DIAGNOSIS — I48.91 NEW ONSET ATRIAL FIBRILLATION (HCC): ICD-10-CM

## 2022-03-07 RX ORDER — APIXABAN 2.5 MG/1
TABLET, FILM COATED ORAL
Qty: 60 TABLET | Refills: 10 | Status: SHIPPED | OUTPATIENT
Start: 2022-03-07

## 2022-03-15 ENCOUNTER — OFFICE VISIT (OUTPATIENT)
Dept: FAMILY MEDICINE CLINIC | Facility: CLINIC | Age: 87
End: 2022-03-15
Payer: MEDICARE

## 2022-03-15 VITALS
BODY MASS INDEX: 27.92 KG/M2 | DIASTOLIC BLOOD PRESSURE: 62 MMHG | HEART RATE: 57 BPM | WEIGHT: 133 LBS | SYSTOLIC BLOOD PRESSURE: 110 MMHG | TEMPERATURE: 98 F | RESPIRATION RATE: 28 BRPM | OXYGEN SATURATION: 93 % | HEIGHT: 58 IN

## 2022-03-15 DIAGNOSIS — E03.9 HYPOTHYROIDISM, UNSPECIFIED TYPE: ICD-10-CM

## 2022-03-15 DIAGNOSIS — I10 HYPERTENSION, UNSPECIFIED TYPE: ICD-10-CM

## 2022-03-15 DIAGNOSIS — F41.9 ANXIETY: ICD-10-CM

## 2022-03-15 DIAGNOSIS — J45.30 MILD PERSISTENT ASTHMA, UNSPECIFIED WHETHER COMPLICATED: ICD-10-CM

## 2022-03-15 DIAGNOSIS — J10.1 INFLUENZA A WITH RESPIRATORY MANIFESTATIONS: ICD-10-CM

## 2022-03-15 DIAGNOSIS — I48.91 NEW ONSET ATRIAL FIBRILLATION (HCC): Primary | ICD-10-CM

## 2022-03-15 DIAGNOSIS — J45.40 MODERATE PERSISTENT ASTHMA WITHOUT COMPLICATION: ICD-10-CM

## 2022-03-15 DIAGNOSIS — N18.30 STAGE 3 CHRONIC KIDNEY DISEASE, UNSPECIFIED WHETHER STAGE 3A OR 3B CKD (HCC): ICD-10-CM

## 2022-03-15 PROCEDURE — 99214 OFFICE O/P EST MOD 30 MIN: CPT | Performed by: FAMILY MEDICINE

## 2022-03-15 RX ORDER — CITALOPRAM 10 MG/1
10 TABLET ORAL DAILY
Qty: 30 TABLET | Refills: 5 | Status: SHIPPED | OUTPATIENT
Start: 2022-03-15 | End: 2022-05-18

## 2022-03-15 RX ORDER — ALBUTEROL SULFATE 90 UG/1
2 AEROSOL, METERED RESPIRATORY (INHALATION) EVERY 6 HOURS PRN
Qty: 18 G | Refills: 3 | Status: SHIPPED | OUTPATIENT
Start: 2022-03-15 | End: 2022-05-18 | Stop reason: SDUPTHER

## 2022-03-15 RX ORDER — PREDNISONE 10 MG/1
TABLET ORAL
Qty: 45 TABLET | Refills: 0 | Status: SHIPPED | OUTPATIENT
Start: 2022-03-15 | End: 2022-05-18

## 2022-03-15 NOTE — PROGRESS NOTES
Assessment/Plan:  The patient will follow with Cardiology on 24th  Hospital records reviewed at this time  This includes the studies as well as laboratory studies  Patient will continue with Eliquis as directed  Afib controlled at the present time  Patient will continue with Advair as directed  Patient use prednisone taper as directed  The patient start Celexa  Follow-up in 2 months       Diagnoses and all orders for this visit:    New onset atrial fibrillation (HCC)    Moderate persistent asthma without complication  -     predniSONE 10 mg tablet; 5 pills daily for 3 days, 4 for 3 days, 3 for 3 days, 2 for 3 days , 1 for 3 days  Influenza A with respiratory manifestations    Hypothyroidism, unspecified type  -     albuterol (Ventolin HFA) 90 mcg/act inhaler; Inhale 2 puffs every 6 (six) hours as needed for wheezing Ventolin brand only    Hypertension, unspecified type  -     albuterol (Ventolin HFA) 90 mcg/act inhaler; Inhale 2 puffs every 6 (six) hours as needed for wheezing Ventolin brand only    Mild persistent asthma, unspecified whether complicated  -     albuterol (Ventolin HFA) 90 mcg/act inhaler; Inhale 2 puffs every 6 (six) hours as needed for wheezing Ventolin brand only    Stage 3 chronic kidney disease, unspecified whether stage 3a or 3b CKD (HCC)    Anxiety  -     citalopram (CeleXA) 10 mg tablet; Take 1 tablet (10 mg total) by mouth daily            Subjective:        Patient ID: Marya Yanez is a 80 y o  female  Patient is here due to ongoing fatigue and tiredness  No palpitations noted by the patient  Patient was in the hospital from the 23rd to 27th of last month for acute respiratory failure due to influenza and pneumonia along with new onset Afib  Patient still having fatigue and tiredness  Patient getting more depressed  Patient does use albuterol nebulizer treatment with good results  No chest pain          The following portions of the patient's history were reviewed and updated as appropriate: allergies, current medications, past family history, past medical history, past social history, past surgical history and problem list       Review of Systems   Constitutional: Positive for fatigue  Negative for fever  HENT: Negative  Eyes: Negative  Respiratory: Positive for cough and shortness of breath  Cardiovascular: Negative  Gastrointestinal: Negative  Endocrine: Negative  Genitourinary: Negative  Musculoskeletal: Negative  Skin: Negative  Allergic/Immunologic: Negative  Neurological: Negative  Hematological: Negative  Psychiatric/Behavioral: Positive for dysphoric mood  Objective:        Depression Screening and Follow-up Plan: Patient was screened for depression during today's encounter  They screened negative with a PHQ-2 score of 2             /62 (BP Location: Right arm, Patient Position: Sitting, Cuff Size: Standard)   Pulse 57   Temp 98 °F (36 7 °C) (Tympanic)   Resp (!) 28   Ht 4' 9 5" (1 461 m)   Wt 60 3 kg (133 lb)   SpO2 93%   BMI 28 28 kg/m²          Physical Exam  Vitals and nursing note reviewed  Constitutional:       General: She is not in acute distress  Appearance: Normal appearance  She is not ill-appearing, toxic-appearing or diaphoretic  HENT:      Head: Normocephalic and atraumatic  Right Ear: Tympanic membrane, ear canal and external ear normal  There is no impacted cerumen  Left Ear: Tympanic membrane, ear canal and external ear normal  There is no impacted cerumen  Nose: Nose normal  No congestion or rhinorrhea  Mouth/Throat:      Mouth: Mucous membranes are moist       Pharynx: No oropharyngeal exudate or posterior oropharyngeal erythema  Eyes:      General: No scleral icterus  Right eye: No discharge  Left eye: No discharge  Extraocular Movements: Extraocular movements intact        Conjunctiva/sclera: Conjunctivae normal       Pupils: Pupils are equal, round, and reactive to light  Neck:      Vascular: No carotid bruit  Cardiovascular:      Rate and Rhythm: Normal rate and regular rhythm  Pulses: Normal pulses  Heart sounds: Murmur heard  No friction rub  No gallop  Pulmonary:      Effort: Pulmonary effort is normal  No respiratory distress  Breath sounds: No stridor  Wheezing present  No rhonchi or rales  Chest:      Chest wall: No tenderness  Abdominal:      General: Abdomen is flat  Bowel sounds are normal  There is no distension  Palpations: Abdomen is soft  Tenderness: There is no abdominal tenderness  There is no guarding or rebound  Musculoskeletal:         General: No swelling, tenderness, deformity or signs of injury  Normal range of motion  Cervical back: Normal range of motion and neck supple  No rigidity  No muscular tenderness  Right lower leg: No edema  Left lower leg: No edema  Lymphadenopathy:      Cervical: No cervical adenopathy  Skin:     General: Skin is warm and dry  Capillary Refill: Capillary refill takes less than 2 seconds  Coloration: Skin is not jaundiced  Findings: No bruising, erythema, lesion or rash  Neurological:      General: No focal deficit present  Mental Status: She is alert and oriented to person, place, and time  Cranial Nerves: No cranial nerve deficit  Sensory: No sensory deficit  Motor: No weakness  Coordination: Coordination normal       Gait: Gait normal    Psychiatric:         Mood and Affect: Mood normal          Behavior: Behavior normal          Thought Content:  Thought content normal          Judgment: Judgment normal

## 2022-04-28 ENCOUNTER — TELEPHONE (OUTPATIENT)
Dept: FAMILY MEDICINE CLINIC | Facility: CLINIC | Age: 87
End: 2022-04-28

## 2022-04-28 NOTE — TELEPHONE ENCOUNTER
Lorelei Rockwell from [address] residence called in stating patient has been put on Celexa and she has been feeling down and very anxious since being on it and she is wondering if you would like to discontinue the medication or change it  Please review and advise

## 2022-05-18 ENCOUNTER — OFFICE VISIT (OUTPATIENT)
Dept: FAMILY MEDICINE CLINIC | Facility: CLINIC | Age: 87
End: 2022-05-18
Payer: MEDICARE

## 2022-05-18 VITALS
HEART RATE: 51 BPM | TEMPERATURE: 98.3 F | DIASTOLIC BLOOD PRESSURE: 58 MMHG | WEIGHT: 130.4 LBS | SYSTOLIC BLOOD PRESSURE: 142 MMHG | HEIGHT: 58 IN | BODY MASS INDEX: 27.37 KG/M2 | OXYGEN SATURATION: 89 %

## 2022-05-18 DIAGNOSIS — J45.30 MILD PERSISTENT ASTHMA, UNSPECIFIED WHETHER COMPLICATED: ICD-10-CM

## 2022-05-18 DIAGNOSIS — F41.9 ANXIETY: ICD-10-CM

## 2022-05-18 DIAGNOSIS — I10 HYPERTENSION, UNSPECIFIED TYPE: ICD-10-CM

## 2022-05-18 DIAGNOSIS — E03.9 HYPOTHYROIDISM, UNSPECIFIED TYPE: ICD-10-CM

## 2022-05-18 DIAGNOSIS — Z00.00 MEDICARE ANNUAL WELLNESS VISIT, SUBSEQUENT: Primary | ICD-10-CM

## 2022-05-18 PROCEDURE — G0439 PPPS, SUBSEQ VISIT: HCPCS | Performed by: FAMILY MEDICINE

## 2022-05-18 RX ORDER — ALBUTEROL SULFATE 90 UG/1
2 AEROSOL, METERED RESPIRATORY (INHALATION) EVERY 4 HOURS PRN
Qty: 18 G | Refills: 3 | Status: SHIPPED | OUTPATIENT
Start: 2022-05-18

## 2022-05-18 RX ORDER — ROSUVASTATIN CALCIUM 10 MG/1
TABLET, COATED ORAL
COMMUNITY
Start: 2022-04-18

## 2022-05-18 RX ORDER — DULOXETIN HYDROCHLORIDE 30 MG/1
30 CAPSULE, DELAYED RELEASE ORAL DAILY
Qty: 30 CAPSULE | Refills: 5 | Status: SHIPPED | OUTPATIENT
Start: 2022-05-18

## 2022-05-18 RX ORDER — METOPROLOL TARTRATE 75 MG/1
TABLET, FILM COATED ORAL
COMMUNITY
Start: 2022-04-24

## 2022-05-18 NOTE — PROGRESS NOTES
Assessment and Plan:     Problem List Items Addressed This Visit    None          Preventive health issues were discussed with patient, and age appropriate screening tests were ordered as noted in patient's After Visit Summary  Personalized health advice and appropriate referrals for health education or preventive services given if needed, as noted in patient's After Visit Summary       History of Present Illness:     Patient presents for Medicare Annual Wellness visit    Patient Care Team:  Vero Brandon DO as PCP - General (Family Medicine)  Beth Brownlee DO     Problem List:     Patient Active Problem List   Diagnosis    Anemia    Asthma    Atherosclerosis of native artery of both lower extremities with intermittent claudication ([address] )    Carotid atherosclerosis    Complete tear of right rotator cuff    Disc degeneration, lumbar    GERD without esophagitis    Glaucoma    Hyperlipidemia    Hypertension    Hypothyroidism    Lumbar canal stenosis    Peripheral neuropathy    Spondylosis of lumbar region without myelopathy or radiculopathy    Incomplete tear of left rotator cuff    Trigger little finger of left hand    Slow transit constipation    Carpal tunnel syndrome of left wrist    Carpal tunnel syndrome, left    Preop cardiovascular exam    Cellulitis of toe of right foot    Age-related osteoporosis without current pathological fracture    Lumbar back pain    Trigger ring finger of left hand    Trigger finger, right ring finger    Trigger finger, left little finger    Shortness of breath    Primary osteoarthritis of right knee    Mixed stress and urge urinary incontinence    Hiatal hernia    Dermatitis    Urge incontinence of urine    Lower extremity edema    Preop examination    Nasal polyposis    Right facial swelling    Chronic diastolic CHF (congestive heart failure) (HCC)    Continuous opioid dependence ([address] )    Influenza A with respiratory manifestations    New onset atrial fibrillation (HCC)    Atrial fibrillation (HCC)    Stage 3 chronic kidney disease, unspecified whether stage 3a or 3b CKD ([facility]    Anxiety      Past Medical and Surgical History:     Past Medical History:   Diagnosis Date    Anxiety     Arthritis     OA    Asthma     good control    Burn     Chronic pain disorder     spinal stenosis    GERD (gastroesophageal reflux disease)     off and on    Glaucoma     Hiatal hernia     HLD (hyperlipidemia)     Hx of fall 2015    Hypertension     PVD (peripheral vascular disease) ([facility] )     Thyroid disease     hypo     Past Surgical History:   Procedure Laterality Date    ANGIOPLASTY      jacquelyn lower legs    BLADDER SUSPENSION      with mesh    CATARACT EXTRACTION Bilateral     COLONOSCOPY      COLONOSCOPY W/ POLYPECTOMY      via Colostomy; Pt does not have colostomy - no listing for a polypectomy of colon per [employer]    DILATION AND CURETTAGE OF UTERUS      NASAL POLYP EXCISION      Nasal Endoscopy Polypectomy    OTHER SURGICAL HISTORY  04/2007    Uterine prolapse and repair     DC REVISE MEDIAN N/CARPAL TUNNEL SURG Left 1/10/2019    Procedure: RELEASE LEFT CARPAL TUNNEL;  Surgeon: Sophia Bellamy MD;  Location: [address] [place] [address];  Service: Orthopedics      Family History:     Family History   Problem Relation Age of Onset    Other Family         Back Pain     Hypertension Family     Thyroid disease Family         Disorder      Social History:     Social History     Socioeconomic History    Marital status:      Spouse name: None    Number of children: None    Years of education: None    Highest education level: None   Occupational History    None   Tobacco Use    Smoking status: Never Smoker    Smokeless tobacco: Never Used   Vaping Use    Vaping Use: Never used   Substance and Sexual Activity    Alcohol use:  Yes     Alcohol/week: 1 0 standard drink     Types: 1 Glasses of wine per week     Comment: rare    Drug use: No    Sexual activity: Never   Other Topics Concern    None   Social History Narrative    None     Social Determinants of Health     Financial Resource Strain: Not on file   Food Insecurity: No Food Insecurity    Worried About Running Out of Food in the Last Year: Never true    Joshua of Food in the Last Year: Never true   Transportation Needs: No Transportation Needs    Lack of Transportation (Medical): No    Lack of Transportation (Non-Medical):  No   Physical Activity: Not on file   Stress: Not on file   Social Connections: Not on file   Intimate Partner Violence: Not on file   Housing Stability: Low Risk     Unable to Pay for Housing in the Last Year: No    Number of Places Lived in the Last Year: 1    Unstable Housing in the Last Year: No      Medications and Allergies:     Current Outpatient Medications   Medication Sig Dispense Refill    albuterol (2 5 mg/3 mL) 0 083 % nebulizer solution Take 3 mL (2 5 mg total) by nebulization every 6 (six) hours as needed for wheezing or shortness of breath 75 mL 1    albuterol (Ventolin HFA) 90 mcg/act inhaler Inhale 2 puffs every 6 (six) hours as needed for wheezing Ventolin brand only 18 g 3    Alphagan P 0 1 %       amLODIPine (NORVASC) 5 mg tablet Take 1 tablet (5 mg total) by mouth daily 90 tablet 1    aspirin 81 MG tablet Take 81 mg by mouth daily        Biotin 5 MG CAPS Take by mouth      citalopram (CeleXA) 10 mg tablet Take 1 tablet (10 mg total) by mouth daily 30 tablet 5    Eliquis 2 5 MG TAKE 1 TABLET (2 5 MG TOTAL) BY MOUTH 2 (TWO) TIMES A DAY 60 tablet 10    fluticasone (FLONASE) 50 mcg/act nasal spray 2 SPRAYS IN EACH NOSTRIL EVERY DAY 16 g 10    furosemide (LASIX) 20 mg tablet       gabapentin (NEURONTIN) 300 mg capsule Take 1 capsule at hs 90 capsule 1    latanoprost (XALATAN) 0 005 % ophthalmic solution Administer 1 drop to both eyes daily at bedtime        levothyroxine 50 mcg tablet Take 1 tablet (50 mcg total) by mouth daily 90 tablet 1    lisinopril (ZESTRIL) 20 mg tablet Take 1 tablet (20 mg total) by mouth daily 90 tablet 1    Metoprolol Tartrate 75 MG TABS       rosuvastatin (CRESTOR) 10 MG tablet       torsemide (DEMADEX) 10 mg tablet TAKE 1 TABLET (10 MG TOTAL) BY MOUTH DAILY 30 tablet 10    Vitamin D High Potency 25 MCG (1000 UT) capsule GIVE 1 CAP BY MOUTH EVERY DAY DX: SUPPLEMENT 30 capsule 10    brimonidine tartrate 0 2 % ophthalmic solution Administer 1 drop to both eyes 2 (two) times a day   (Patient not taking: Reported on 5/18/2022)      CALCIUM PO Take by mouth (Patient not taking: Reported on 5/18/2022)      fluticasone-salmeterol (ADVAIR, WIXELA) 250-50 mcg/dose inhaler Inhale 1 puff 2 (two) times a day Rinse mouth after use  1 Inhaler 2    Mirabegron ER (Myrbetriq) 25 MG TB24 Take 25 mg by mouth daily (Patient not taking: No sig reported)      mometasone (ELOCON) 0 1 % cream Apply topically daily (Patient not taking: No sig reported) 45 g 0    Omega-3 Fatty Acids (EQL OMEGA 3 FISH OIL) 1200 MG CAPS Take by mouth (Patient not taking: Reported on 5/18/2022)      oxyCODONE (ROXICODONE) 5 mg immediate release tablet Take 1 tablet (5 mg total) by mouth every 4 (four) hours as needed for moderate pain or severe painMax Daily Amount: 30 mg 10 tablet 0     No current facility-administered medications for this visit       Allergies   Allergen Reactions    Cephalexin Nausea Only    Erythromycin     Phenazopyridine     Piroxicam     Sulfamethoxazole-Trimethoprim       Immunizations:     Immunization History   Administered Date(s) Administered    COVID-19 Pfizer vac (Michael-sucrose, gray cap) 12 yr+ IM 02/10/2022    INFLUENZA 10/20/2021    Influenza Split High Dose Preservative Free IM 10/27/2016, 10/31/2019    Influenza, high dose seasonal 0 7 mL 09/25/2020    Pneumococcal Conjugate 13-Valent 04/10/2014, 12/22/2016    Pneumococcal Polysaccharide PPV23 04/15/1996, 01/03/2008, 05/22/2019    Tdap 09/18/2019    Tuberculin Skin Test-PPD Intradermal 10/27/2020, 11/03/2020      Health Maintenance: There are no preventive care reminders to display for this patient  Topic Date Due    COVID-19 Vaccine (2 - Pfizer series) 03/03/2022      Medicare Health Risk Assessment:     /58 (BP Location: Left arm, Patient Position: Sitting, Cuff Size: Standard)   Pulse (!) 51   Temp 98 3 °F (36 8 °C) (Temporal)   Ht 4' 9 5" (1 461 m)   Wt 59 1 kg (130 lb 6 4 oz)   SpO2 (!) 89%   BMI 27 73 kg/m²      Tonya Rodriguez is here for her Subsequent Wellness visit  Health Risk Assessment:   Patient rates overall health as fair  Patient feels that their physical health rating is slightly worse  Patient is satisfied with their life  Eyesight was rated as same  Hearing was rated as slightly worse  Patient feels that their emotional and mental health rating is same  Patients states they are never, rarely angry  Patient states they are often unusually tired/fatigued  Pain experienced in the last 7 days has been some  Patient's pain rating has been 6/10  Patient states that she has experienced no weight loss or gain in last 6 months  Depression Screening:   PHQ-2 Score: 1      Fall Risk Screening: In the past year, patient has experienced: no history of falling in past year      Urinary Incontinence Screening:   Patient has not leaked urine accidently in the last six months  Home Safety:  Patient does not have trouble with stairs inside or outside of their home  Patient has working smoke alarms and has working carbon monoxide detector  Home safety hazards include: none  Nutrition:   Current diet is Regular  She gets food at her home so sometimes it is good and some is not and she sometimes does not eat     Medications:   Patient is currently taking over-the-counter supplements  OTC medications include: see medication list  Patient is not able to manage medications   The home she is in manages her medications     Activities of Daily Living (ADLs)/Instrumental Activities of Daily Living (IADLs):   Walk and transfer into and out of bed and chair?: Yes  Dress and groom yourself?: Yes    Bathe or shower yourself?: Yes    Feed yourself? Yes  Do your laundry/housekeeping?: Yes  Manage your money, pay your bills and track your expenses?: No  Make your own meals?: No    Do your own shopping?: No    Advance Care Planning:   Living will: Yes    Durable POA for healthcare: Yes    Advanced directive: Yes      Cognitive Screening:   Provider or family/friend/caregiver concerned regarding cognition?: No    PREVENTIVE SCREENINGS      Cardiovascular Screening:    General: Screening Not Indicated, History Lipid Disorder, Risks and Benefits Discussed and Screening Current      Diabetes Screening:     General: Screening Current and Risks and Benefits Discussed      Colorectal Cancer Screening:     General: Screening Not Indicated and Risks and Benefits Discussed      Breast Cancer Screening:     General: Risks and Benefits Discussed and Screening Not Indicated      Cervical Cancer Screening:    General: Screening Not Indicated and Risks and Benefits Discussed      Osteoporosis Screening:    General: Screening Not Indicated, History Osteoporosis, Risks and Benefits Discussed and Patient Declines      Abdominal Aortic Aneurysm (AAA) Screening:        General: Risks and Benefits Discussed and Screening Not Indicated      Lung Cancer Screening:     General: Screening Not Indicated and Risks and Benefits Discussed      Hepatitis C Screening:    General: Risks and Benefits Discussed and Screening Not Indicated    Other Counseling Topics:   Regular weightbearing exercise         Efraín Sanderson,

## 2022-05-18 NOTE — PATIENT INSTRUCTIONS
Medicare Preventive Visit Patient Instructions  Thank you for completing your Welcome to Medicare Visit or Medicare Annual Wellness Visit today  Your next wellness visit will be due in one year (5/19/2023)  The screening/preventive services that you may require over the next 5-10 years are detailed below  Some tests may not apply to you based off risk factors and/or age  Screening tests ordered at today's visit but not completed yet may show as past due  Also, please note that scanned in results may not display below  Preventive Screenings:  Service Recommendations Previous Testing/Comments   Colorectal Cancer Screening  * Colonoscopy    * Fecal Occult Blood Test (FOBT)/Fecal Immunochemical Test (FIT)  * Fecal DNA/Cologuard Test  * Flexible Sigmoidoscopy Age: 54-65 years old   Colonoscopy: every 10 years (may be performed more frequently if at higher risk)  OR  FOBT/FIT: every 1 year  OR  Cologuard: every 3 years  OR  Sigmoidoscopy: every 5 years  Screening may be recommended earlier than age 48 if at higher risk for colorectal cancer  Also, an individualized decision between you and your healthcare provider will decide whether screening between the ages of 74-80 would be appropriate  Colonoscopy: Not on file  FOBT/FIT: Not on file  Cologuard: Not on file  Sigmoidoscopy: Not on file    Screening Not Indicated     Breast Cancer Screening Age: 36 years old  Frequency: every 1-2 years  Not required if history of left and right mastectomy Mammogram: 09/30/2015        Cervical Cancer Screening Between the ages of 21-29, pap smear recommended once every 3 years  Between the ages of 33-67, can perform pap smear with HPV co-testing every 5 years     Recommendations may differ for women with a history of total hysterectomy, cervical cancer, or abnormal pap smears in past  Pap Smear: Not on file    Screening Not Indicated   Hepatitis C Screening Once for adults born between 1945 and 1965  More frequently in patients at high risk for Hepatitis C Hep C Antibody: Not on file        Diabetes Screening 1-2 times per year if you're at risk for diabetes or have pre-diabetes Fasting glucose: 88 mg/dL   A1C: No results in last 5 years    Screening Current   Cholesterol Screening Once every 5 years if you don't have a lipid disorder  May order more often based on risk factors  Lipid panel: 06/22/2021    Screening Not Indicated  History Lipid Disorder     Other Preventive Screenings Covered by Medicare:  1  Abdominal Aortic Aneurysm (AAA) Screening: covered once if your at risk  You're considered to be at risk if you have a family history of AAA  2  Lung Cancer Screening: covers low dose CT scan once per year if you meet all of the following conditions: (1) Age 50-69; (2) No signs or symptoms of lung cancer; (3) Current smoker or have quit smoking within the last 15 years; (4) You have a tobacco smoking history of at least 30 pack years (packs per day multiplied by number of years you smoked); (5) You get a written order from a healthcare provider  3  Glaucoma Screening: covered annually if you're considered high risk: (1) You have diabetes OR (2) Family history of glaucoma OR (3)  aged 48 and older OR (3)  American aged 72 and older  3  Osteoporosis Screening: covered every 2 years if you meet one of the following conditions: (1) You're estrogen deficient and at risk for osteoporosis based off medical history and other findings; (2) Have a vertebral abnormality; (3) On glucocorticoid therapy for more than 3 months; (4) Have primary hyperparathyroidism; (5) On osteoporosis medications and need to assess response to drug therapy  · Last bone density test (DXA Scan): 06/10/2019   5  HIV Screening: covered annually if you're between the age of 15-65  Also covered annually if you are younger than 13 and older than 72 with risk factors for HIV infection   For pregnant patients, it is covered up to 3 times per pregnancy  Immunizations:  Immunization Recommendations   Influenza Vaccine Annual influenza vaccination during flu season is recommended for all persons aged >= 6 months who do not have contraindications   Pneumococcal Vaccine (Prevnar and Pneumovax)  * Prevnar = PCV13  * Pneumovax = PPSV23   Adults 25-60 years old: 1-3 doses may be recommended based on certain risk factors  Adults 72 years old: Prevnar (PCV13) vaccine recommended followed by Pneumovax (PPSV23) vaccine  If already received PPSV23 since turning 65, then PCV13 recommended at least one year after PPSV23 dose  Hepatitis B Vaccine 3 dose series if at intermediate or high risk (ex: diabetes, end stage renal disease, liver disease)   Tetanus (Td) Vaccine - COST NOT COVERED BY MEDICARE PART B Following completion of primary series, a booster dose should be given every 10 years to maintain immunity against tetanus  Td may also be given as tetanus wound prophylaxis  Tdap Vaccine - COST NOT COVERED BY MEDICARE PART B Recommended at least once for all adults  For pregnant patients, recommended with each pregnancy  Shingles Vaccine (Shingrix) - COST NOT COVERED BY MEDICARE PART B  2 shot series recommended in those aged 48 and above     Health Maintenance Due:  There are no preventive care reminders to display for this patient  Immunizations Due:      Topic Date Due    COVID-19 Vaccine (2 - Pfizer series) 03/03/2022     Advance Directives   What are advance directives? Advance directives are legal documents that state your wishes and plans for medical care  These plans are made ahead of time in case you lose your ability to make decisions for yourself  Advance directives can apply to any medical decision, such as the treatments you want, and if you want to donate organs  What are the types of advance directives? There are many types of advance directives, and each state has rules about how to use them   You may choose a combination of any of the following:  · Living will: This is a written record of the treatment you want  You can also choose which treatments you do not want, which to limit, and which to stop at a certain time  This includes surgery, medicine, IV fluid, and tube feedings  · Durable power of  for healthcare [facility]): This is a written record that states who you want to make healthcare choices for you when you are unable to make them for yourself  This person, called a proxy, is usually a family member or a friend  You may choose more than 1 proxy  · Do not resuscitate (DNR) order:  A DNR order is used in case your heart stops beating or you stop breathing  It is a request not to have certain forms of treatment, such as CPR  A DNR order may be included in other types of advance directives  · Medical directive: This covers the care that you want if you are in a coma, near death, or unable to make decisions for yourself  You can list the treatments you want for each condition  Treatment may include pain medicine, surgery, blood transfusions, dialysis, IV or tube feedings, and a ventilator (breathing machine)  · Values history: This document has questions about your views, beliefs, and how you feel and think about life  This information can help others choose the care that you would choose  Why are advance directives important? An advance directive helps you control your care  Although spoken wishes may be used, it is better to have your wishes written down  Spoken wishes can be misunderstood, or not followed  Treatments may be given even if you do not want them  An advance directive may make it easier for your family to make difficult choices about your care  Urinary Incontinence   Urinary incontinence (UI)  is when you lose control of your bladder  UI develops because your bladder cannot store or empty urine properly  The 3 most common types of UI are stress incontinence, urge incontinence, or both    Medicines:   · May be given to help strengthen your bladder control  Report any side effects of medication to your healthcare provider  Do pelvic muscle exercises often:  Your pelvic muscles help you stop urinating  Squeeze these muscles tight for 5 seconds, then relax for 5 seconds  Gradually work up to squeezing for 10 seconds  Do 3 sets of 15 repetitions a day, or as directed  This will help strengthen your pelvic muscles and improve bladder control  Train your bladder:  Go to the bathroom at set times, such as every 2 hours, even if you do not feel the urge to go  You can also try to hold your urine when you feel the urge to go  For example, hold your urine for 5 minutes when you feel the urge to go  As that becomes easier, hold your urine for 10 minutes  Self-care:   · Keep a UI record  Write down how often you leak urine and how much you leak  Make a note of what you were doing when you leaked urine  · Drink liquids as directed  You may need to limit the amount of liquid you drink to help control your urine leakage  Do not drink any liquid right before you go to bed  Limit or do not have drinks that contain caffeine or alcohol  · Prevent constipation  Eat a variety of high-fiber foods  Good examples are high-fiber cereals, beans, vegetables, and whole-grain breads  Walking is the best way to trigger your intestines to have a bowel movement  · Exercise regularly and maintain a healthy weight  Weight loss and exercise will decrease pressure on your bladder and help you control your leakage  · Use a catheter as directed  to help empty your bladder  A catheter is a tiny, plastic tube that is put into your bladder to drain your urine  · Go to behavior therapy as directed  Behavior therapy may be used to help you learn to control your urge to urinate      Weight Management   Why it is important to manage your weight:  Being overweight increases your risk of health conditions such as heart disease, high blood pressure, type 2 diabetes, and certain types of cancer  It can also increase your risk for osteoarthritis, sleep apnea, and other respiratory problems  Aim for a slow, steady weight loss  Even a small amount of weight loss can lower your risk of health problems  How to lose weight safely:  A safe and healthy way to lose weight is to eat fewer calories and get regular exercise  You can lose up about 1 pound a week by decreasing the number of calories you eat by 500 calories each day  Healthy meal plan for weight management:  A healthy meal plan includes a variety of foods, contains fewer calories, and helps you stay healthy  A healthy meal plan includes the following:  · Eat whole-grain foods more often  A healthy meal plan should contain fiber  Fiber is the part of grains, fruits, and vegetables that is not broken down by your body  Whole-grain foods are healthy and provide extra fiber in your diet  Some examples of whole-grain foods are whole-wheat breads and pastas, oatmeal, brown rice, and bulgur  · Eat a variety of vegetables every day  Include dark, leafy greens such as spinach, kale, dilcia greens, and mustard greens  Eat yellow and orange vegetables such as carrots, sweet potatoes, and winter squash  · Eat a variety of fruits every day  Choose fresh or canned fruit (canned in its own juice or light syrup) instead of juice  Fruit juice has very little or no fiber  · Eat low-fat dairy foods  Drink fat-free (skim) milk or 1% milk  Eat fat-free yogurt and low-fat cottage cheese  Try low-fat cheeses such as mozzarella and other reduced-fat cheeses  · Choose meat and other protein foods that are low in fat  Choose beans or other legumes such as split peas or lentils  Choose fish, skinless poultry (chicken or turkey), or lean cuts of red meat (beef or pork)  Before you cook meat or poultry, cut off any visible fat  · Use less fat and oil  Try baking foods instead of frying them   Add less fat, such as margarine, sour cream, regular salad dressing and mayonnaise to foods  Eat fewer high-fat foods  Some examples of high-fat foods include french fries, doughnuts, ice cream, and cakes  · Eat fewer sweets  Limit foods and drinks that are high in sugar  This includes candy, cookies, regular soda, and sweetened drinks  Exercise:  Exercise at least 30 minutes per day on most days of the week  Some examples of exercise include walking, biking, dancing, and swimming  You can also fit in more physical activity by taking the stairs instead of the elevator or parking farther away from stores  Ask your healthcare provider about the best exercise plan for you  © Copyright [employer] 2018 Information is for End User's use only and may not be sold, redistributed or otherwise used for commercial purposes   All illustrations and images included in CareNotes® are the copyrighted property of A D A M , Inc  or [address] [place] [address]

## 2022-08-18 ENCOUNTER — RA CDI HCC (OUTPATIENT)
Dept: OTHER | Facility: HOSPITAL | Age: 87
End: 2022-08-18

## 2022-08-18 NOTE — PROGRESS NOTES
[facility] 75  coding opportunities     I13 0     Chart Reviewed number of suggestions sent to Provider: 1     Patients Insurance     Medicare Insurance: [employer]

## 2022-08-24 ENCOUNTER — OFFICE VISIT (OUTPATIENT)
Dept: FAMILY MEDICINE CLINIC | Facility: CLINIC | Age: 87
End: 2022-08-24
Payer: MEDICARE

## 2022-08-24 VITALS
HEART RATE: 52 BPM | BODY MASS INDEX: 26.03 KG/M2 | OXYGEN SATURATION: 94 % | SYSTOLIC BLOOD PRESSURE: 140 MMHG | WEIGHT: 124 LBS | TEMPERATURE: 96 F | HEIGHT: 58 IN | DIASTOLIC BLOOD PRESSURE: 74 MMHG

## 2022-08-24 DIAGNOSIS — M48.062 SPINAL STENOSIS OF LUMBAR REGION WITH NEUROGENIC CLAUDICATION: ICD-10-CM

## 2022-08-24 DIAGNOSIS — I10 HYPERTENSION, UNSPECIFIED TYPE: ICD-10-CM

## 2022-08-24 DIAGNOSIS — N18.30 STAGE 3 CHRONIC KIDNEY DISEASE, UNSPECIFIED WHETHER STAGE 3A OR 3B CKD (HCC): ICD-10-CM

## 2022-08-24 DIAGNOSIS — L65.9 HAIR LOSS: ICD-10-CM

## 2022-08-24 DIAGNOSIS — J45.30 MILD PERSISTENT ASTHMA, UNSPECIFIED WHETHER COMPLICATED: ICD-10-CM

## 2022-08-24 DIAGNOSIS — I10 PRIMARY HYPERTENSION: ICD-10-CM

## 2022-08-24 DIAGNOSIS — I70.213 ATHEROSCLEROSIS OF NATIVE ARTERY OF BOTH LOWER EXTREMITIES WITH INTERMITTENT CLAUDICATION (HCC): ICD-10-CM

## 2022-08-24 DIAGNOSIS — I50.32 CHRONIC DIASTOLIC CHF (CONGESTIVE HEART FAILURE) (HCC): ICD-10-CM

## 2022-08-24 DIAGNOSIS — E78.2 MIXED HYPERLIPIDEMIA: ICD-10-CM

## 2022-08-24 DIAGNOSIS — E03.9 HYPOTHYROIDISM, UNSPECIFIED TYPE: Primary | ICD-10-CM

## 2022-08-24 DIAGNOSIS — F41.9 ANXIETY: ICD-10-CM

## 2022-08-24 DIAGNOSIS — I48.0 PAROXYSMAL ATRIAL FIBRILLATION (HCC): ICD-10-CM

## 2022-08-24 PROCEDURE — 99214 OFFICE O/P EST MOD 30 MIN: CPT | Performed by: FAMILY MEDICINE

## 2022-08-24 RX ORDER — MULTIVITAMIN WITH IRON
TABLET ORAL
COMMUNITY
Start: 2022-08-10

## 2022-08-24 RX ORDER — ASPIRIN 81 MG
TABLET,CHEWABLE ORAL
COMMUNITY
Start: 2022-08-15 | End: 2022-08-24

## 2022-08-24 RX ORDER — AMLODIPINE BESYLATE 2.5 MG/1
2.5 TABLET ORAL DAILY
Qty: 90 TABLET | Refills: 1 | Status: SHIPPED | OUTPATIENT
Start: 2022-08-24

## 2022-08-24 RX ORDER — PREDNISONE 10 MG/1
TABLET ORAL
Qty: 30 TABLET | Refills: 0 | Status: SHIPPED | OUTPATIENT
Start: 2022-08-24

## 2022-08-24 RX ORDER — DULOXETIN HYDROCHLORIDE 60 MG/1
60 CAPSULE, DELAYED RELEASE ORAL DAILY
Qty: 90 CAPSULE | Refills: 1 | Status: SHIPPED | OUTPATIENT
Start: 2022-08-24

## 2022-08-24 NOTE — PROGRESS NOTES
Assessment/Plan:  CBC BMP magnesium level reviewed  Last TSH 1 4  Patient will decrease amlodipine due to lower extremity edema  Patient will increase Cymbalta 60 mg daily  This for anxiety  Patient will continue with medications for other chronic conditions listed below  Patient go for laboratory studies prior to next visit  Patient will be referred to pain management  Follow-up in 3 months  Diagnoses and all orders for this visit:    Hypothyroidism, unspecified type  -     amLODIPine (NORVASC) 2 5 mg tablet; Take 1 tablet (2 5 mg total) by mouth daily  -     CBC and differential; Future  -     Lipid panel; Future  -     TSH, 3rd generation with Free T4 reflex; Future  -     Comprehensive metabolic panel; Future  -     Magnesium; Future  -     Ferritin; Future    Primary hypertension  -     CBC and differential; Future  -     Lipid panel; Future  -     TSH, 3rd generation with Free T4 reflex; Future  -     Comprehensive metabolic panel; Future  -     Magnesium; Future  -     Ferritin; Future    Atherosclerosis of native artery of both lower extremities with intermittent claudication (HCC)  -     CBC and differential; Future  -     Lipid panel; Future  -     TSH, 3rd generation with Free T4 reflex; Future  -     Comprehensive metabolic panel; Future  -     Magnesium; Future  -     Ferritin; Future    Chronic diastolic CHF (congestive heart failure) (HCC)  -     CBC and differential; Future  -     Lipid panel; Future  -     TSH, 3rd generation with Free T4 reflex; Future  -     Comprehensive metabolic panel; Future  -     Magnesium; Future  -     Ferritin; Future    Paroxysmal atrial fibrillation (HCC)    Stage 3 chronic kidney disease, unspecified whether stage 3a or 3b CKD (HCC)  -     CBC and differential; Future  -     Lipid panel; Future  -     TSH, 3rd generation with Free T4 reflex; Future  -     Comprehensive metabolic panel; Future  -     Magnesium; Future  -     Ferritin;  Future    Mixed hyperlipidemia  -     CBC and differential; Future  -     Lipid panel; Future  -     TSH, 3rd generation with Free T4 reflex; Future  -     Comprehensive metabolic panel; Future  -     Magnesium; Future  -     Ferritin; Future    Spinal stenosis of lumbar region with neurogenic claudication  -     predniSONE 10 mg tablet; 5 pills daily for 2 days, 4 for 2 days, 3 for 2 days, 2 for 2 days, 1 for 2 days  -     Ambulatory Referral to Pain Management; Future    Anxiety  -     DULoxetine (Cymbalta) 60 mg delayed release capsule; Take 1 capsule (60 mg total) by mouth daily    Hypertension, unspecified type  -     amLODIPine (NORVASC) 2 5 mg tablet; Take 1 tablet (2 5 mg total) by mouth daily  -     CBC and differential; Future  -     Lipid panel; Future  -     TSH, 3rd generation with Free T4 reflex; Future  -     Comprehensive metabolic panel; Future  -     Magnesium; Future  -     Ferritin; Future    Mild persistent asthma, unspecified whether complicated  -     amLODIPine (NORVASC) 2 5 mg tablet; Take 1 tablet (2 5 mg total) by mouth daily    Hair loss  -     Ferritin; Future    Other orders  -     Discontinue: Aspirin Low Dose 81 MG chewable tablet  -     B Complex-C (B-complex with vitamin C) tablet            Subjective:        Patient ID: Marya Yanez is a 80 y o  female  Patient follow-up on hypertension CHF AFib hypothyroidism and other chronic issues  Patient still having some anxiety issues and back pain with some radicular symptoms  Patient also with arthritis of the hands  The following portions of the patient's history were reviewed and updated as appropriate: allergies, current medications, past family history, past medical history, past social history, past surgical history and problem list       Review of Systems   Constitutional: Negative  HENT: Negative  Eyes: Negative  Respiratory: Negative  Cardiovascular: Negative  Gastrointestinal: Negative  Endocrine: Negative  Genitourinary: Negative  Musculoskeletal: Positive for arthralgias and gait problem  Skin: Negative  Allergic/Immunologic: Negative  Hematological: Negative  Psychiatric/Behavioral: The patient is nervous/anxious  Objective:               /74 (BP Location: Right arm, Patient Position: Sitting, Cuff Size: Standard)   Pulse (!) 52   Temp (!) 96 °F (35 6 °C)   Ht 4' 9 5" (1 461 m)   Wt 56 2 kg (124 lb)   SpO2 94%   BMI 26 37 kg/m²          Physical Exam  Vitals and nursing note reviewed  Constitutional:       General: She is not in acute distress  Appearance: Normal appearance  She is not ill-appearing, toxic-appearing or diaphoretic  HENT:      Head: Normocephalic and atraumatic  Right Ear: Tympanic membrane, ear canal and external ear normal  There is no impacted cerumen  Left Ear: Tympanic membrane, ear canal and external ear normal  There is no impacted cerumen  Nose: Nose normal  No congestion or rhinorrhea  Mouth/Throat:      Mouth: Mucous membranes are moist       Pharynx: No oropharyngeal exudate or posterior oropharyngeal erythema  Eyes:      General: No scleral icterus  Right eye: No discharge  Left eye: No discharge  Extraocular Movements: Extraocular movements intact  Conjunctiva/sclera: Conjunctivae normal       Pupils: Pupils are equal, round, and reactive to light  Neck:      Vascular: No carotid bruit  Cardiovascular:      Rate and Rhythm: Normal rate and regular rhythm  Pulses: Normal pulses  Heart sounds: Normal heart sounds  No murmur heard  No friction rub  No gallop  Pulmonary:      Effort: Pulmonary effort is normal  No respiratory distress  Breath sounds: Normal breath sounds  No stridor  No wheezing, rhonchi or rales  Chest:      Chest wall: No tenderness  Musculoskeletal:         General: No swelling, tenderness, deformity or signs of injury  Normal range of motion  Cervical back: Normal range of motion and neck supple  No rigidity  No muscular tenderness  Right lower leg: Edema present  Left lower leg: Edema present  Lymphadenopathy:      Cervical: No cervical adenopathy  Skin:     General: Skin is warm and dry  Capillary Refill: Capillary refill takes less than 2 seconds  Coloration: Skin is not jaundiced  Findings: No bruising, erythema, lesion or rash  Neurological:      Mental Status: She is alert and oriented to person, place, and time  Mental status is at baseline  Cranial Nerves: No cranial nerve deficit  Sensory: No sensory deficit  Motor: No weakness  Coordination: Coordination normal       Gait: Gait normal    Psychiatric:         Behavior: Behavior normal          Thought Content:  Thought content normal          Judgment: Judgment normal       Comments: Anxious

## 2022-10-11 PROBLEM — J10.1 INFLUENZA A WITH RESPIRATORY MANIFESTATIONS: Status: RESOLVED | Noted: 2022-02-23 | Resolved: 2022-10-11

## 2022-10-28 ENCOUNTER — NURSE TRIAGE (OUTPATIENT)
Dept: OTHER | Facility: OTHER | Age: 87
End: 2022-10-28

## 2022-10-28 DIAGNOSIS — J45.30 MILD PERSISTENT ASTHMA, UNSPECIFIED WHETHER COMPLICATED: ICD-10-CM

## 2022-10-28 DIAGNOSIS — E03.9 HYPOTHYROIDISM, UNSPECIFIED TYPE: ICD-10-CM

## 2022-10-28 DIAGNOSIS — I10 HYPERTENSION, UNSPECIFIED TYPE: ICD-10-CM

## 2022-10-28 NOTE — TELEPHONE ENCOUNTER
Reason for Disposition  • Caller has already spoken with the PCP and has no further questions      Protocols used: NO CONTACT OR DUPLICATE CONTACT CALL-ADULT-

## 2022-10-28 NOTE — TELEPHONE ENCOUNTER
Regarding: emergency refill request   ----- Message from [facility] sent at 10/28/2022  4:49 PM EDT -----  "She needs a refill for all her medications"

## 2022-11-14 ENCOUNTER — OFFICE VISIT (OUTPATIENT)
Dept: FAMILY MEDICINE CLINIC | Facility: CLINIC | Age: 87
End: 2022-11-14

## 2022-11-14 VITALS
DIASTOLIC BLOOD PRESSURE: 80 MMHG | HEART RATE: 80 BPM | HEIGHT: 58 IN | OXYGEN SATURATION: 91 % | TEMPERATURE: 97.8 F | BODY MASS INDEX: 26.57 KG/M2 | SYSTOLIC BLOOD PRESSURE: 138 MMHG | WEIGHT: 126.6 LBS

## 2022-11-14 DIAGNOSIS — F41.9 ANXIETY: ICD-10-CM

## 2022-11-14 DIAGNOSIS — J33.9 NASAL POLYPOSIS: ICD-10-CM

## 2022-11-14 DIAGNOSIS — I48.0 PAROXYSMAL ATRIAL FIBRILLATION (HCC): ICD-10-CM

## 2022-11-14 DIAGNOSIS — I10 PRIMARY HYPERTENSION: ICD-10-CM

## 2022-11-14 DIAGNOSIS — E78.2 MIXED HYPERLIPIDEMIA: ICD-10-CM

## 2022-11-14 DIAGNOSIS — E03.9 HYPOTHYROIDISM, UNSPECIFIED TYPE: Primary | ICD-10-CM

## 2022-11-14 DIAGNOSIS — K21.9 GERD WITHOUT ESOPHAGITIS: ICD-10-CM

## 2022-11-14 RX ORDER — BUSPIRONE HYDROCHLORIDE 7.5 MG/1
7.5 TABLET ORAL 2 TIMES DAILY
Qty: 60 TABLET | Refills: 5 | Status: SHIPPED | OUTPATIENT
Start: 2022-11-14

## 2022-11-14 RX ORDER — HYDROCHLOROTHIAZIDE 12.5 MG/1
12.5 TABLET ORAL DAILY
Qty: 90 TABLET | Refills: 1 | Status: SHIPPED | OUTPATIENT
Start: 2022-11-14

## 2022-11-14 NOTE — PROGRESS NOTES
Assessment/Plan:  CMP CBC BNP reviewed  Patient will start hydrochlorothiazide and stop amlodipine given lower extremity edema  Patient will continue with current regimen for AFib, hypothyroidism in GERD  Refills will be given when needed  Patient will use Claritin as well as Flonase routinely for nasal polyposis and toe allergic symptoms  Patient will start BuSpar for anxiety  Continue with Cymbalta  Patient up-to-date with flu shot as well as Prevnar 20  Follow-up in 3 months       Diagnoses and all orders for this visit:    Hypothyroidism, unspecified type    GERD without esophagitis    Primary hypertension  -     hydrochlorothiazide (HYDRODIURIL) 12 5 mg tablet; Take 1 tablet (12 5 mg total) by mouth daily    Paroxysmal atrial fibrillation (HCC)    Mixed hyperlipidemia    Anxiety  -     busPIRone (BUSPAR) 7 5 mg tablet; Take 1 tablet (7 5 mg total) by mouth 2 (two) times a day    Nasal polyposis            Subjective:        Patient ID: Carlita Mead is a 80 y o  female  Patient is here to follow-up on hypothyroidism, hypertension hyperlipidemia paroxysmal AFib anxiety  Patient is still feeling anxious and wishes medication this regard  No other new significant chest pain or shortness of breath or problems with urination or defecation  Patient has had some falls recently  Patient with some lower extremity edema  The following portions of the patient's history were reviewed and updated as appropriate: allergies, current medications, past family history, past medical history, past social history, past surgical history and problem list       Review of Systems   Constitutional: Negative  HENT: Negative  Eyes: Negative  Respiratory: Negative  Cardiovascular: Positive for leg swelling  Gastrointestinal: Negative  Endocrine: Negative  Genitourinary: Negative  Musculoskeletal: Negative  Skin: Negative  Allergic/Immunologic: Negative  Neurological: Negative  Hematological: Negative  Psychiatric/Behavioral: The patient is nervous/anxious  Objective:      BMI Counseling: Body mass index is 26 92 kg/m²  The BMI is above normal  Nutrition recommendations include consuming healthier snacks  Exercise recommendations include strength training exercises  Rationale for BMI follow-up plan is due to patient being overweight or obese  /80 (BP Location: Right arm, Patient Position: Sitting, Cuff Size: Standard)   Pulse 80   Temp 97 8 °F (36 6 °C) (Temporal)   Ht 4' 9 5" (1 461 m)   Wt 57 4 kg (126 lb 9 6 oz)   SpO2 91%   BMI 26 92 kg/m²          Physical Exam  Vitals and nursing note reviewed  Constitutional:       General: She is not in acute distress  Appearance: Normal appearance  She is not ill-appearing, toxic-appearing or diaphoretic  HENT:      Head: Normocephalic and atraumatic  Right Ear: Tympanic membrane, ear canal and external ear normal  There is no impacted cerumen  Left Ear: Tympanic membrane, ear canal and external ear normal  There is no impacted cerumen  Nose: Nose normal  No congestion or rhinorrhea  Mouth/Throat:      Mouth: Mucous membranes are moist       Pharynx: No oropharyngeal exudate or posterior oropharyngeal erythema  Eyes:      General: No scleral icterus  Right eye: No discharge  Left eye: No discharge  Extraocular Movements: Extraocular movements intact  Conjunctiva/sclera: Conjunctivae normal       Pupils: Pupils are equal, round, and reactive to light  Neck:      Vascular: No carotid bruit  Cardiovascular:      Rate and Rhythm: Normal rate and regular rhythm  Pulses: Normal pulses  Heart sounds: Normal heart sounds  No murmur heard  No friction rub  No gallop  Pulmonary:      Effort: Pulmonary effort is normal  No respiratory distress  Breath sounds: Normal breath sounds  No stridor  No wheezing, rhonchi or rales     Chest: Chest wall: No tenderness  Musculoskeletal:         General: No swelling, tenderness, deformity or signs of injury  Cervical back: Neck supple  No rigidity  No muscular tenderness  Right lower leg: Edema present  Left lower leg: Edema present  Lymphadenopathy:      Cervical: No cervical adenopathy  Skin:     General: Skin is warm and dry  Capillary Refill: Capillary refill takes less than 2 seconds  Coloration: Skin is not jaundiced  Findings: No bruising, erythema, lesion or rash  Neurological:      Mental Status: She is alert and oriented to person, place, and time  Mental status is at baseline  Cranial Nerves: No cranial nerve deficit  Sensory: No sensory deficit  Motor: No weakness  Coordination: Coordination normal       Gait: Gait normal    Psychiatric:         Behavior: Behavior normal          Thought Content:  Thought content normal          Judgment: Judgment normal       Comments: Anxious

## 2022-11-15 ENCOUNTER — TELEPHONE (OUTPATIENT)
Dept: FAMILY MEDICINE CLINIC | Facility: CLINIC | Age: 87
End: 2022-11-15

## 2022-11-15 NOTE — TELEPHONE ENCOUNTER
I spoke to Gilberto Welch at the [employer]  She is going to send an order over for Dr Quiana Cota to sign regarding how the pt is to be administered her Claritin

## 2022-11-16 ENCOUNTER — TELEPHONE (OUTPATIENT)
Dept: FAMILY MEDICINE CLINIC | Facility: CLINIC | Age: 87
End: 2022-11-16

## 2022-11-16 DIAGNOSIS — J33.9 NASAL POLYPOSIS: Primary | ICD-10-CM

## 2022-11-16 RX ORDER — LORATADINE 10 MG/1
10 TABLET ORAL DAILY
Qty: 90 TABLET | Refills: 3 | Status: SHIPPED | OUTPATIENT
Start: 2022-11-16 | End: 2022-11-17 | Stop reason: SDUPTHER

## 2022-11-16 NOTE — TELEPHONE ENCOUNTER
Pt needs claritan rx ordered by you so it can be sent to pharmacy    Self admisiter ayr nasal gel , needs note sent to [facility] [phone], for this to be done by patient

## 2022-11-17 ENCOUNTER — TELEPHONE (OUTPATIENT)
Dept: FAMILY MEDICINE CLINIC | Facility: CLINIC | Age: 87
End: 2022-11-17

## 2022-11-17 DIAGNOSIS — J33.9 NASAL POLYPOSIS: ICD-10-CM

## 2022-11-17 RX ORDER — LORATADINE 10 MG/1
10 TABLET ORAL DAILY
Qty: 90 TABLET | Refills: 3 | Status: SHIPPED | OUTPATIENT
Start: 2022-11-17

## 2022-12-06 DIAGNOSIS — I10 PRIMARY HYPERTENSION: ICD-10-CM

## 2022-12-07 RX ORDER — HYDROCHLOROTHIAZIDE 12.5 MG/1
12.5 TABLET ORAL DAILY
Qty: 30 TABLET | Refills: 0 | Status: SHIPPED | OUTPATIENT
Start: 2022-12-07 | End: 2022-12-14

## 2022-12-13 ENCOUNTER — APPOINTMENT (EMERGENCY)
Dept: RADIOLOGY | Facility: HOSPITAL | Age: 87
End: 2022-12-13

## 2022-12-13 ENCOUNTER — HOSPITAL ENCOUNTER (EMERGENCY)
Facility: HOSPITAL | Age: 87
Discharge: HOME | End: 2022-12-13
Attending: SURGERY

## 2022-12-13 VITALS
TEMPERATURE: 99.1 F | DIASTOLIC BLOOD PRESSURE: 70 MMHG | OXYGEN SATURATION: 94 % | RESPIRATION RATE: 20 BRPM | HEART RATE: 96 BPM | SYSTOLIC BLOOD PRESSURE: 145 MMHG
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
## 2022-12-13 DIAGNOSIS — S09.90XA CLOSED HEAD INJURY, INITIAL ENCOUNTER: ICD-10-CM

## 2022-12-13 DIAGNOSIS — I10 PRIMARY HYPERTENSION: ICD-10-CM
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
## 2022-12-13 LAB
BASE EXCESS BLDA CALC-SCNC: 9 MMOL/L (ref -2–3)
CA-I BLD-SCNC: 1.09 MMOL/L (ref 1.12–1.32)
GLUCOSE SERPL-MCNC: 96 MG/DL (ref 65–140)
HCO3 BLDA-SCNC: 34.6 MMOL/L (ref 24–30)
HCT VFR BLD CALC: 36 % (ref 34.8–46.1)
HGB BLDA-MCNC: 12.2 G/DL (ref 11.5–15.4)
HOLD SPECIMEN: NORMAL
PCO2 BLD: 36 MMOL/L (ref 21–32)
PCO2 BLD: 48.5 MM HG (ref 42–50)
PH BLD: 7.46 [PH] (ref 7.3–7.4)
PO2 BLD: 32 MM HG (ref 35–45)
POTASSIUM BLD-SCNC: 4.5 MMOL/L (ref 3.5–5.3)
SAO2 % BLD FROM PO2: 65 % (ref 60–85)
SODIUM BLD-SCNC: 137 MMOL/L (ref 136–145)
SPECIMEN SOURCE: ABNORMAL

## 2022-12-13 NOTE — ED ATTENDING ATTESTATION
12/13/2022  IPiyush MD, saw and evaluated the patient  I have discussed the patient with the resident/non-physician practitioner and agree with the resident's/non-physician practitioner's findings, Plan of Care, and MDM as documented in the resident's/non-physician practitioner's note, except where noted  All available labs and Radiology studies were reviewed  I was present for key portions of any procedure(s) performed by the resident/non-physician practitioner and I was immediately available to provide assistance  At this point I agree with the current assessment done in the Emergency Department  I have conducted an independent evaluation of this patient a history and physical is as follows: This is a 55-year-old female who presents to the emergency department after a fall  Patient states that she was going to answer her phone when she fell  Patient states she did not strike her head  Patient is on blood thinners for atrial fibrillation  During initial evaluation, it was noted that patient had a hematoma to her right eyebrow  Patient had stable airway, no pooling secretions, clear nasal breath sounds, adequate oxygenation  We will plan to upgrade to level B trauma for further management    Collar applied  ED Course         Critical Care Time  Procedures

## 2022-12-13 NOTE — PROCEDURES
POC FAST US    Date/Time: 12/13/2022 3:53 PM  Performed by: Nicol Sierra DO  Authorized by: Nicol Sierra DO     Patient location:  Trauma  Other Assisting Provider: No    Procedure details:     Exam Type:  Diagnostic    Assess for:  Intra-abdominal fluid and pericardial effusion    Technique: FAST      Views obtained:  Heart - Pericardial sac, RUQ - Pascual's Pouch, LUQ - Splenorenal space and Suprapubic - Pouch of Len    Image quality: diagnostic      Image availability:  Images available in PACS, still images obtained and video obtained  FAST Findings:     RUQ (Hepatorenal) free fluid: absent      LUQ (Splenorenal) free fluid: absent      Suprapubic free fluid: absent      Cardiac wall motion: identified      Pericardial effusion: absent    Interpretation:     Impressions: negative

## 2022-12-13 NOTE — DISCHARGE INSTRUCTIONS
You have been evaluated in the Emergency Department today for your head injury  Your CT scan did not show signs of bleeds or fractures in your head  You may take ibuprofen every 6 hours or tylenol every 6 hours as needed for pain  Please schedule an appointment for follow up with your primary care physician as soon as possible  Return to the Emergency Department if you experience worsening or uncontrolled pain, vision changes, recurrent vomiting, difficulty with normal activities, abnormal behavior, difficulty walking, numbness, weakness, or any other concerning symptoms  Your CT scan showed the following incidental findings:    "Small right pleural effusion, which appears simple in density  Tubular opacities in the right middle lobe and lingula medially, extending from the antonio to the pleural surfaces, increased compared to August 2022, possibly areas of atelectasis/scarring "    Please follow up with your family doctor for further evaluation of these findings

## 2022-12-13 NOTE — CASE MANAGEMENT
Case Management Discharge Planning Note    Patient name Madie Odonnell  Location TR 02/TR 02 MRN [id number]  : 1930 Date 2022       Current Admission Date: 2022  Current Admission Diagnosis:Unspecified multiple injuries, initial encounter   Patient Active Problem List    Diagnosis Date Noted   • Hair loss 2022   • Stage 3 chronic kidney disease, unspecified whether stage 3a or 3b CKD ([facility] ) 03/15/2022   • Anxiety 03/15/2022   • New onset atrial fibrillation ([facility] ) 2022   • Atrial fibrillation ([facility] ) 2022   • Right facial swelling 01/10/2022   • Chronic diastolic CHF (congestive heart failure) ([facility] ) 01/10/2022   • Continuous opioid dependence ([facility] ) 01/10/2022   • Preop examination 2021   • Nasal polyposis 2021   • Urge incontinence of urine 2021   • Lower extremity edema 2021   • Dermatitis 2021   • Hiatal hernia 2020   • Shortness of breath 2020   • Primary osteoarthritis of right knee 2020   • Mixed stress and urge urinary incontinence 2020   • Trigger ring finger of left hand 2019   • Trigger finger, right ring finger 2019   • Trigger finger, left little finger 2019   • Lumbar back pain 2019   • Age-related osteoporosis without current pathological fracture 2019   • Cellulitis of toe of right foot 2019   • Preop cardiovascular exam 2019   • Carpal tunnel syndrome, left 2018   • Carpal tunnel syndrome of left wrist 10/23/2018   • Slow transit constipation 2018   • Incomplete tear of left rotator cuff 2018   • Trigger little finger of left hand 2018   • Complete tear of right rotator cuff 2017   • Atherosclerosis of native artery of both lower extremities with intermittent claudication (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 radiculopathy 11/20/2012   • GERD without esophagitis 08/16/2012   • Glaucoma 08/16/2012   • Asthma 06/26/2012   • Hypertension 06/26/2012   • Hypothyroidism 06/26/2012      LOS (days): 0  Geometric Mean LOS (GMLOS) (days):   Days to GMLOS:     OBJECTIVE:            Current admission status: Emergency   Preferred Pharmacy:   [address] [place] [address]  Phone: [phone] Fax: [id number] [place] [id number]  Phone: [phone] Fax: [phone]    Primary Care Provider: Alejandro Leonard DO    Primary Insurance: MEDICARE  Secondary Insurance: BLUE CROSS    DISCHARGE DETAILS:    Cm responded to trauma alert  Pt fell with + head strike, lac to head, as well as T/L spine tenderness

## 2022-12-13 NOTE — QUICK NOTE
Cervical Collar Clearance: The patient had a CT scan of the cervical spine demonstrating no acute injury  On exam, the patient had no midline point tenderness or paresthesias/numbness/weakness in the extremities  The patient had full range of motion (was then able to flex, extend, and rotate head laterally) without pain  There were no distracting injuries and the patient was not intoxicated  The patient's cervical spine was cleared radiologically and clinically  Cervical collar removed at this time       Penny Magana DO  12/13/2022 5:16 PM

## 2022-12-13 NOTE — H&P
H&P - Trauma   Florecita Piedra 80 y o  female MRN: [id number]  Unit/Bed#: [facility] Encounter: [id number]    Trauma Alert: Level B   Model of Arrival: Ambulance    Trauma Team: Attending Miladis Suggs and Residents Sherwin Workman  Consultants:     None     Assessment/Plan   Active Problems / Assessment:   Mechanical Fall  [facility]  On eliquis     Plan:   CTH, CT C spine, CT CT CAP: No traumatic injuries  Able to ambulate with walker  D/c with PCP f/u    History of Present Illness     Chief Complaint: Fall  Mechanism:Fall     HPI:    Florecita Piedra is a 80 y o  female, on eliquis, who initially presented to the ED as a fall  Patient initially denied a headstrike, but was found to have an obvious bruise to her forehead  She was subsequently upgraded to a Level B trauma  In the bay, patient had no complaints  She states she fell forward, but is unable to further explain  She states one of the nurses at her facility called  She denies any LOC  Review of Systems   Respiratory: Negative for shortness of breath  Cardiovascular: Negative for chest pain  Musculoskeletal: Positive for back pain  Negative for neck pain  Skin: Positive for color change  Negative for wound  Neurological: Negative for dizziness, weakness, numbness and headaches  All other systems reviewed and are negative  12-point, complete review of systems was reviewed and negative except as stated above       Historical Information     Past Medical History:   Diagnosis Date   • Anxiety    • Arthritis     OA   • Asthma     good control   • Burn    • Chronic pain disorder     spinal stenosis   • GERD (gastroesophageal reflux disease)     off and on   • Glaucoma    • Hiatal hernia    • HLD (hyperlipidemia)    • Hx of fall 2015   • Hypertension    • PVD (peripheral vascular disease) (HCC)    • Thyroid disease     hypo     Past Surgical History:   Procedure Laterality Date   • ANGIOPLASTY      jacquelyn lower legs   • BLADDER SUSPENSION      with mesh   • CATARACT EXTRACTION Bilateral    • COLONOSCOPY     • COLONOSCOPY W/ POLYPECTOMY      via Colostomy; Pt does not have colostomy - no listing for a polypectomy of colon per [employer]   • DILATION AND CURETTAGE OF UTERUS     • NASAL POLYP EXCISION      Nasal Endoscopy Polypectomy   • OTHER SURGICAL HISTORY  04/2007    Uterine prolapse and repair    • AL REVISE MEDIAN N/CARPAL TUNNEL SURG Left 1/10/2019    Procedure: RELEASE LEFT CARPAL TUNNEL;  Surgeon: Greyson Márquez MD;  Location: [address] [place] [address];  Service: Orthopedics        Social History     Tobacco Use   • Smoking status: Never   • Smokeless tobacco: Never   Vaping Use   • Vaping Use: Never used   Substance Use Topics   • Alcohol use: Yes     Alcohol/week: 1 0 standard drink     Types: 1 Glasses of wine per week     Comment: rare   • Drug use: No     Immunization History   Administered Date(s) Administered   • COVID-19 Pfizer Vac BIVALENT Michael-sucrose 12 Yr+ IM (BOOSTER ONLY) 09/15/2022   • COVID-19 Pfizer vac (Michael-sucrose, gray cap) 12 yr+ IM 02/10/2022   • INFLUENZA 10/20/2021, 10/09/2022   • Influenza Split High Dose Preservative Free IM 10/27/2016, 10/31/2019   • Influenza, high dose seasonal 0 7 mL 09/25/2020   • Pneumococcal Conjugate 13-Valent 04/10/2014, 12/22/2016   • Pneumococcal Conjugate Vaccine 20-valent (Pcv20), Polysace 10/09/2022   • Pneumococcal Polysaccharide PPV23 04/15/1996, 01/03/2008, 05/22/2019   • Tdap 09/18/2019   • Tuberculin Skin Test-PPD Intradermal 10/27/2020, 11/03/2020     Last Tetanus: 9/18/2019  Family History: Non-contributory       Meds/Allergies   all current active meds have been reviewed, current meds:   No current facility-administered medications for this encounter  and PTA meds:   Prior to Admission Medications   Prescriptions Last Dose Informant Patient Reported? Taking?    Alphagan P 0 1 %   Yes No   B Complex-C (B-complex with vitamin C) tablet   Yes No   Biotin 5 MG CAPS   Yes No   Sig: Take by mouth   CALCIUM PO   Yes No   Sig: Take by mouth   Patient not taking: No sig reported   DULoxetine (Cymbalta) 60 mg delayed release capsule   No No   Sig: Take 1 capsule (60 mg total) by mouth daily   Eliquis 2 5 MG   No No   Sig: TAKE 1 TABLET (2 5 MG TOTAL) BY MOUTH 2 (TWO) TIMES A DAY   Metoprolol Tartrate 75 MG TABS   Yes No   Vitamin D High Potency 25 MCG (1000 UT) capsule   No No   Sig: GIVE 1 CAP BY MOUTH EVERY DAY DX: SUPPLEMENT   albuterol (2 5 mg/3 mL) 0 083 % nebulizer solution   No No   Sig: Take 3 mL (2 5 mg total) by nebulization every 6 (six) hours as needed for wheezing or shortness of breath   albuterol (Ventolin HFA) 90 mcg/act inhaler   No No   Sig: Inhale 2 puffs every 4 (four) hours as needed for wheezing Patient may keep in room with her   brimonidine tartrate 0 2 % ophthalmic solution   Yes No   Sig: Administer 1 drop to both eyes 2 (two) times a day     Patient not taking: No sig reported   busPIRone (BUSPAR) 7 5 mg tablet   No No   Sig: Take 1 tablet (7 5 mg total) by mouth 2 (two) times a day   fluticasone (FLONASE) 50 mcg/act nasal spray   No No   Si SPRAYS IN EACH NOSTRIL EVERY DAY   fluticasone-salmeterol (ADVAIR, WIXELA) 250-50 mcg/dose inhaler   No No   Sig: Inhale 1 puff 2 (two) times a day Rinse mouth after use     furosemide (LASIX) 20 mg tablet   Yes No   gabapentin (NEURONTIN) 300 mg capsule   No No   Sig: Take 1 capsule at hs   hydrochlorothiazide (HYDRODIURIL) 12 5 mg tablet   No No   Sig: TAKE 1 TABLET (12 5 MG TOTAL) BY MOUTH DAILY   latanoprost (XALATAN) 0 005 % ophthalmic solution   Yes No   Sig: Administer 1 drop to both eyes daily at bedtime     levothyroxine 50 mcg tablet   No No   Sig: Take 1 tablet (50 mcg total) by mouth daily   lisinopril (ZESTRIL) 20 mg tablet   No No   Sig: Take 1 tablet (20 mg total) by mouth daily   loratadine (CLARITIN) 10 mg tablet   No No   Sig: Take 1 tablet (10 mg total) by mouth daily   oxyCODONE (ROXICODONE) 5 mg immediate release tablet   No No   Sig: Take 1 tablet (5 mg total) by mouth every 4 (four) hours as needed for moderate pain or severe painMax Daily Amount: 30 mg   predniSONE 10 mg tablet   No No   Si pills daily for 2 days, 4 for 2 days, 3 for 2 days, 2 for 2 days, 1 for 2 days   rosuvastatin (CRESTOR) 10 MG tablet   Yes No   torsemide (DEMADEX) 10 mg tablet   No No   Sig: TAKE 1 TABLET (10 MG TOTAL) BY MOUTH DAILY      Facility-Administered Medications: None        Allergies   Allergen Reactions   • Cephalexin Nausea Only   • Erythromycin    • Phenazopyridine    • Piroxicam    • Sulfamethoxazole-Trimethoprim        Objective   Initial Vitals:   Temperature: 97 6 °F (36 4 °C) (22 152)  Pulse: (!) 110 (22 152)  Respirations: 20 (22 152)  Blood Pressure: 138/77 (22 152)    Primary Survey:   Airway:        Status: patent;        Pre-hospital Interventions: none        Hospital Interventions: none  Breathing:        Pre-hospital Interventions: none       Effort: normal       Right breath sounds: normal       Left breath sounds: normal  Circulation:        Rhythm: regular no murmur       Rate: regular   Right Pulses Left Pulses    R radial: 2+  R femoral: 2+  R pedal: 2+     L radial: 2+  L femoral: 2+  L pedal: 2+       Disability:        GCS: Eye: 4; Verbal: 5 Motor: 6 Total: 15       Right Pupil: 3 mm;  round;  reactive         Left Pupil:  3 mm;  round;  reactive      R Motor Strength L Motor Strength    R : 5/5   L : 5/5          Sensory:  No sensory deficit  Exposure:       Completed: Yes      Secondary Survey:  Physical Exam  Vitals and nursing note reviewed  Constitutional:       Appearance: She is not ill-appearing or toxic-appearing  HENT:      Head: Normocephalic  Right Ear: External ear normal       Left Ear: External ear normal    Eyes:      General: No scleral icterus  Right eye: No discharge  Left eye: No discharge  Pupils: Pupils are equal, round, and reactive to light     Cardiovascular:      Rate and Rhythm: Regular rhythm  Tachycardia present  Pulses: Normal pulses  Heart sounds: Normal heart sounds  Pulmonary:      Effort: Pulmonary effort is normal  No respiratory distress  Breath sounds: Normal breath sounds  Abdominal:      Palpations: Abdomen is soft  Tenderness: There is no abdominal tenderness  Musculoskeletal:      Cervical back: No tenderness or bony tenderness  Thoracic back: Tenderness and bony tenderness present  Lumbar back: Tenderness and bony tenderness present  Comments: No step offs, no deformities   Neurological:      Mental Status: She is alert  Invasive Devices     Peripheral Intravenous Line  Duration           Peripheral IV 12/13/22 Left Wrist <1 day              Lab Results: BMP/CMP:   Lab Results   Component Value Date    CO2 36 (H) 12/13/2022    GLUCOSE 96 12/13/2022   , CBC:   Lab Results   Component Value Date    HGB 12 2 12/13/2022    HCT 36 12/13/2022    and ISTAT: No components found for: VBG    Imaging Results: I have personally reviewed pertinent reports  Chest Xray(s): pending   FAST exam(s): negative for acute findings   CT Scan(s): negative for acute findings   Additional Xray(s): N/A     Other Studies: N/A    Code Status: Prior  Advance Directive and Living Will:      Power of :    POLST:    I have spent 45 minutes with Patient  today in which greater than 50% of this time was spent in counseling/coordination of care regarding Diagnostic results, Patient and family education and Impressions

## 2022-12-13 NOTE — ED NOTES
Please call  Julius Chagn when eval done, she would like to know if pt will be admitted   [address] [place] [address]  12/13/22 [id number]

## 2022-12-14 RX ORDER — HYDROCHLOROTHIAZIDE 12.5 MG/1
12.5 TABLET ORAL DAILY
Qty: 30 TABLET | Refills: 11 | Status: SHIPPED | OUTPATIENT
Start: 2022-12-14

## 2023-02-14 ENCOUNTER — OFFICE VISIT (OUTPATIENT)
Dept: FAMILY MEDICINE CLINIC | Facility: CLINIC | Age: 88
End: 2023-02-14

## 2023-02-14 VITALS
SYSTOLIC BLOOD PRESSURE: 126 MMHG | WEIGHT: 125 LBS | DIASTOLIC BLOOD PRESSURE: 78 MMHG | HEART RATE: 64 BPM | OXYGEN SATURATION: 96 % | BODY MASS INDEX: 26.24 KG/M2 | TEMPERATURE: 97.9 F | HEIGHT: 58 IN

## 2023-02-14 DIAGNOSIS — I48.0 PAROXYSMAL ATRIAL FIBRILLATION (HCC): ICD-10-CM

## 2023-02-14 DIAGNOSIS — E03.9 HYPOTHYROIDISM, UNSPECIFIED TYPE: Primary | ICD-10-CM

## 2023-02-14 DIAGNOSIS — I73.9 PERIPHERAL VASCULAR DISEASE, UNSPECIFIED (HCC): ICD-10-CM

## 2023-02-14 DIAGNOSIS — M79.605 PAIN IN BOTH LOWER EXTREMITIES: ICD-10-CM

## 2023-02-14 DIAGNOSIS — I10 PRIMARY HYPERTENSION: ICD-10-CM

## 2023-02-14 DIAGNOSIS — I50.32 CHRONIC DIASTOLIC CHF (CONGESTIVE HEART FAILURE) (HCC): ICD-10-CM

## 2023-02-14 DIAGNOSIS — N18.30 STAGE 3 CHRONIC KIDNEY DISEASE, UNSPECIFIED WHETHER STAGE 3A OR 3B CKD (HCC): ICD-10-CM

## 2023-02-14 DIAGNOSIS — M48.061 SPINAL STENOSIS OF LUMBAR REGION, UNSPECIFIED WHETHER NEUROGENIC CLAUDICATION PRESENT: ICD-10-CM

## 2023-02-14 DIAGNOSIS — M79.604 PAIN IN BOTH LOWER EXTREMITIES: ICD-10-CM

## 2023-02-14 DIAGNOSIS — F11.20 CONTINUOUS OPIOID DEPENDENCE (HCC): ICD-10-CM

## 2023-02-14 DIAGNOSIS — E78.2 MIXED HYPERLIPIDEMIA: ICD-10-CM

## 2023-02-14 RX ORDER — GABAPENTIN 300 MG/1
CAPSULE ORAL
Qty: 90 CAPSULE | Refills: 1 | Status: SHIPPED | OUTPATIENT
Start: 2023-02-14

## 2023-02-14 NOTE — PROGRESS NOTES
Assessment/Plan: Labs and records reviewed  Patient will laboratory studies prior to next visit  Patient will continue with current regimen of medications for chronic conditions other than increasing gabapentin to 3 mg nightly and 100 mg in the morning  Patient will follow-up in 3 months or as needed  Patient will labs prior to next visit  To consider increasing Demadex at follow-up visit  Diagnoses and all orders for this visit:    Hypothyroidism, unspecified type  -     CBC and differential; Future  -     Comprehensive metabolic panel; Future  -     Lipid panel; Future  -     TSH, 3rd generation with Free T4 reflex; Future    Primary hypertension  -     CBC and differential; Future  -     Comprehensive metabolic panel; Future  -     Lipid panel; Future  -     TSH, 3rd generation with Free T4 reflex; Future    Paroxysmal atrial fibrillation (HCC)  -     CBC and differential; Future  -     Comprehensive metabolic panel; Future  -     Lipid panel; Future  -     TSH, 3rd generation with Free T4 reflex; Future    Stage 3 chronic kidney disease, unspecified whether stage 3a or 3b CKD (HCC)  -     CBC and differential; Future  -     Comprehensive metabolic panel; Future  -     Lipid panel; Future  -     TSH, 3rd generation with Free T4 reflex; Future    Mixed hyperlipidemia  -     CBC and differential; Future  -     Comprehensive metabolic panel; Future  -     Lipid panel; Future  -     TSH, 3rd generation with Free T4 reflex; Future    Pain in both lower extremities  -     CBC and differential; Future  -     Comprehensive metabolic panel; Future  -     Lipid panel; Future  -     TSH, 3rd generation with Free T4 reflex; Future    Chronic diastolic CHF (congestive heart failure) (HCC)    Continuous opioid dependence (HCC)    Peripheral vascular disease, unspecified (HCC)            Subjective:        Patient ID: Dilan Valentine is a 80 y o  female        Patient is here to follow-up on hypertension A-fib hypothyroidism chronic kidney disease  Patient having difficulty staying asleep at night  Patient also does nap throughout the day  Minimal cough noted  No significant chest pain  No fever        The following portions of the patient's history were reviewed and updated as appropriate: allergies, current medications, past family history, past medical history, past social history, past surgical history and problem list       Review of Systems   Constitutional: Negative  HENT: Negative  Eyes: Negative  Respiratory: Positive for cough  Cardiovascular: Negative  Gastrointestinal: Negative  Endocrine: Negative  Genitourinary: Negative  Musculoskeletal: Positive for arthralgias  Negative for back pain  Skin: Negative  Allergic/Immunologic: Negative  Neurological: Negative  Hematological: Negative  Psychiatric/Behavioral: Negative  Objective:      BMI Counseling: Body mass index is 26 58 kg/m²  The BMI is above normal  Nutrition recommendations include consuming healthier snacks  Exercise recommendations include exercising 3-5 times per week  Rationale for BMI follow-up plan is due to patient being overweight or obese  Depression Screening and Follow-up Plan: Clincally patient does not have depression  No treatment is required  /78 (BP Location: Right arm, Patient Position: Sitting, Cuff Size: Standard)   Pulse 64   Temp 97 9 °F (36 6 °C) (Temporal)   Ht 4' 9 5" (1 461 m)   Wt 56 7 kg (125 lb)   SpO2 96%   BMI 26 58 kg/m²          Physical Exam  Vitals and nursing note reviewed  Constitutional:       General: She is not in acute distress  Appearance: Normal appearance  She is not ill-appearing, toxic-appearing or diaphoretic  HENT:      Head: Normocephalic and atraumatic  Right Ear: Tympanic membrane, ear canal and external ear normal  There is no impacted cerumen        Left Ear: Tympanic membrane, ear canal and external ear normal  There is no impacted cerumen  Nose: Nose normal  No congestion or rhinorrhea  Mouth/Throat:      Mouth: Mucous membranes are moist       Pharynx: No oropharyngeal exudate or posterior oropharyngeal erythema  Eyes:      General: No scleral icterus  Right eye: No discharge  Left eye: No discharge  Extraocular Movements: Extraocular movements intact  Conjunctiva/sclera: Conjunctivae normal       Pupils: Pupils are equal, round, and reactive to light  Neck:      Vascular: No carotid bruit  Cardiovascular:      Rate and Rhythm: Normal rate and regular rhythm  Pulses: Normal pulses  Heart sounds: Normal heart sounds  No murmur heard  No friction rub  No gallop  Pulmonary:      Effort: Pulmonary effort is normal  No respiratory distress  Breath sounds: No stridor  Wheezing present  No rhonchi or rales  Chest:      Chest wall: No tenderness  Musculoskeletal:         General: Tenderness present  No swelling, deformity or signs of injury  Cervical back: Normal range of motion and neck supple  No rigidity  No muscular tenderness  Right lower leg: Edema present  Left lower leg: Edema present  Lymphadenopathy:      Cervical: No cervical adenopathy  Skin:     General: Skin is warm and dry  Capillary Refill: Capillary refill takes less than 2 seconds  Coloration: Skin is not jaundiced  Findings: No bruising, erythema, lesion or rash  Neurological:      Mental Status: She is alert and oriented to person, place, and time  Mental status is at baseline  Cranial Nerves: No cranial nerve deficit  Sensory: No sensory deficit  Motor: No weakness  Coordination: Coordination normal       Gait: Gait normal    Psychiatric:         Mood and Affect: Mood normal          Behavior: Behavior normal          Thought Content:  Thought content normal          Judgment: Judgment normal

## 2023-03-07 DIAGNOSIS — F41.9 ANXIETY: ICD-10-CM

## 2023-03-07 RX ORDER — BUSPIRONE HYDROCHLORIDE 7.5 MG/1
7.5 TABLET ORAL 2 TIMES DAILY
Qty: 60 TABLET | Refills: 4 | Status: SHIPPED | OUTPATIENT
Start: 2023-03-07

## 2023-03-14 DIAGNOSIS — M48.061 SPINAL STENOSIS OF LUMBAR REGION, UNSPECIFIED WHETHER NEUROGENIC CLAUDICATION PRESENT: ICD-10-CM

## 2023-03-14 RX ORDER — GABAPENTIN 300 MG/1
CAPSULE ORAL
Qty: 30 CAPSULE | Refills: 0 | Status: SHIPPED | OUTPATIENT
Start: 2023-03-14

## 2023-03-17 ENCOUNTER — APPOINTMENT (EMERGENCY)
Dept: RADIOLOGY | Facility: HOSPITAL | Age: 88
End: 2023-03-17

## 2023-03-17 ENCOUNTER — HOSPITAL ENCOUNTER (EMERGENCY)
Facility: HOSPITAL | Age: 88
Discharge: HOME | End: 2023-03-17
Attending: STUDENT IN AN ORGANIZED HEALTH CARE EDUCATION/TRAINING PROGRAM

## 2023-03-17 VITALS
DIASTOLIC BLOOD PRESSURE: 67 MMHG | WEIGHT: 133.6 LBS | RESPIRATION RATE: 22 BRPM | SYSTOLIC BLOOD PRESSURE: 145 MMHG | OXYGEN SATURATION: 97 % | HEART RATE: 84 BPM | TEMPERATURE: 97.4 F

## 2023-03-17 DIAGNOSIS — J45.909 ASTHMA: ICD-10-CM

## 2023-03-17 DIAGNOSIS — S09.90XA INJURY OF HEAD, INITIAL ENCOUNTER: ICD-10-CM

## 2023-03-17 DIAGNOSIS — R03.0 ELEVATED BLOOD PRESSURE READING: ICD-10-CM

## 2023-03-17 DIAGNOSIS — D64.9 ANEMIA: ICD-10-CM
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
## 2023-03-17 DIAGNOSIS — S01.81XA FOREHEAD LACERATION: ICD-10-CM

## 2023-03-17 DIAGNOSIS — M50.90 CERVICAL DISC DISEASE: ICD-10-CM

## 2023-03-17 DIAGNOSIS — M25.70 OSTEOPHYTE: ICD-10-CM

## 2023-03-17 LAB
BASE EXCESS BLDA CALC-SCNC: 7 MMOL/L (ref -2–3)
CA-I BLD-SCNC: 1.16 MMOL/L (ref 1.12–1.32)
GLUCOSE SERPL-MCNC: 122 MG/DL (ref 65–140)
HCO3 BLDA-SCNC: 33.4 MMOL/L (ref 24–30)
HCT VFR BLD CALC: 33 % (ref 34.8–46.1)
HGB BLDA-MCNC: 11.2 G/DL (ref 11.5–15.4)
PCO2 BLD: 35 MMOL/L (ref 21–32)
PCO2 BLD: 53.1 MM HG (ref 42–50)
PH BLD: 7.41 [PH] (ref 7.3–7.4)
PO2 BLD: 34 MM HG (ref 35–45)
POTASSIUM BLD-SCNC: 3.6 MMOL/L (ref 3.5–5.3)
SAO2 % BLD FROM PO2: 64 % (ref 60–85)
SODIUM BLD-SCNC: 139 MMOL/L (ref 136–145)
SPECIMEN SOURCE: ABNORMAL

## 2023-03-17 RX ORDER — ALBUTEROL SULFATE 90 UG/1
2 AEROSOL, METERED RESPIRATORY (INHALATION) EVERY 4 HOURS PRN
Status: DISCONTINUED | OUTPATIENT
Start: 2023-03-17 | End: 2023-03-17 | Stop reason: HOSPADM

## 2023-03-17 RX ADMIN — TETANUS TOXOID, REDUCED DIPHTHERIA TOXOID AND ACELLULAR PERTUSSIS VACCINE, ADSORBED 0.5 ML: 5; 2.5; 8; 8; 2.5 SUSPENSION INTRAMUSCULAR at 13:11

## 2023-03-17 NOTE — DISCHARGE INSTRUCTIONS
You were evaluated in the emergency department for: fall  You can access your current and pending results through [facility]  A radiologist will take a second look at your X-Rays, if you had any, and you will be contacted with any new findings  You should follow-up with your primary care provider, as soon as possible, for re-evaluation  If you do not have a primary care provider, I have referred you to [address] [place] [address]  You will be contacted about scheduling an appointment  Their phone number is also included on this paperwork  You may take 650mg of tylenol every four to six hours, not exceeding 3,000mg daily, for the management of your discomfort  Your workup revealed no emergent features at this time; however, many disease processes are dynamic:    Please, return to the emergency department if you experience new or worsening symptoms, fever, chest pain, shortness of breath, difficulty breathing, dizziness, abdominal pain, persistent nausea/vomiting, syncope or passing out, blood in your urine or stool, coughing up blood, leg swelling/pain, urinary retention, bowel or bladder incontinence, numbness between your legs  Additionally, your blood pressure was measured to be high  This is something that you should discuss with your primary care provider and have re-checked within one week  TRAUMA - CT head wo contrast    Result Date: 3/17/2023  Impression: No acute intracranial abnormality  Frontal scalp hematoma  I personally discussed this study with Mary Saavedra 21 on 3/17/2023 at 1:24 PM  Workstation performed: [id number]     TRAUMA - CT spine cervical wo contrast    Result Date: 3/17/2023  Impression: No cervical spine fracture or traumatic malalignment  I personally discussed this study with Mary Saavedra 21 on 3/17/2023 at 1:24 PM   Workstation performed: [id number]       DEGENERATIVE CHANGES:  Aerated disc disease from C3 to C7  Endplate sclerosis at H1-U3    Anterior osteophytes and uncovertebral osteophytes seen

## 2023-03-17 NOTE — CASE MANAGEMENT
Case Management Progress Note    Patient name Dilan Valentine  Location [facility] MRN [id number]  : 1930 Date 3/17/2023       LOS (days): 0  Geometric Mean LOS (GMLOS) (days):   Days to GMLOS:        OBJECTIVE:        Current admission status: Emergency  Preferred Pharmacy: No Pharmacies Listed  Primary Care Provider: No primary care provider on file  Primary Insurance:   Secondary Insurance:     PROGRESS NOTE:    CM responded to trauma alert  Pt was brought to the ED via [address] s/p fall from her bed while living at [address] [place] [address]     Pt c/o + facial strike and -LOC

## 2023-03-17 NOTE — QUICK NOTE
Cervical Collar Clearance: The patient had a CT scan of the cervical spine demonstrating no acute injury  On exam, the patient had no midline point tenderness or paresthesias/numbness/weakness in the extremities  The patient had full range of motion (was then able to flex, extend, and rotate head laterally) without pain  There were no distracting injuries and the patient was not intoxicated  The patient's cervical spine was cleared radiologically and clinically  Cervical collar removed at this time       Alfredo Ferrari MD  3/17/2023 1:43 PM

## 2023-03-17 NOTE — H&P
H&P - Trauma   Daljit Sanders 80 y o  female MRN: [id number]  Unit/Bed#: [facility] Encounter: [id number]    Trauma Alert: Level B   Model of Arrival: Ambulance    Trauma Team: Attending Kristin Jennings, Residents Concetta and DAMEON McGonigal  Consultants:     None     Assessment/Plan   Active Problems / Assessment:     - Fall on eliquis and aspirin - + Head strike   - Frontal hematoma   - 1cm forehead laceration       Plan:   - Tetanus update  - CT head and neck  - Primary closure of laceration     1:44pm: Results reviewed with patient  Patient continues to feel well  She was able to ambulate with a walker (baseline vini her) without difficulty and without pain  Patient has neither questions nor concerns after receiving discharge instructions and return precautions    History of Present Illness     Chief Complaint: fall  Mechanism:Fall     HPI:      Daljit Sanders is a 80 y o  female who presents after a mechanical fall  She was seated on the side of her bed when she fell forward,striking her head  Patient states that she felt as though she was in her usual state of health prior to the fall  She takes eliquis and aspirin  She did not lose consciousness  She denies headache, dizziness, chest pain, dyspnea, abdominal pain, weakness, numbness, dysuria, changes in urination  Patient is without other complaints at this time  Review of Systems   All other systems reviewed and are negative  12-point, complete review of systems was reviewed and negative except as stated above       Historical Information     Past medical history: Asthma, afib  Past surgical history: Angioplasty lower legs      Immunization History   Administered Date(s) Administered   • Tdap 03/17/2023     Last Tetanus: today  Family History: Non-contributory     Meds/Allergies   all current active meds have been reviewed  Allergies have not been reviewed;  Cephalexin - nausea  Objective   Initial Vitals:   Temperature: (!) 97 4 °F (36 3 °C) (03/17/23 1236)  Pulse: 88 (03/17/23 1236)  Respirations: 18 (03/17/23 1236)  Blood Pressure: 147/98 (03/17/23 1236)    Primary Survey:   Airway:        Status: patent;        Pre-hospital Interventions: none        Hospital Interventions: none  Breathing:        Pre-hospital Interventions: none       Effort: normal       Right breath sounds: normal       Left breath sounds: normal  Circulation:        Rhythm: regular       Rate: regular   Right Pulses Left Pulses    R radial: 2+  R femoral: 2+  R pedal: 2+     L radial: 2+  L femoral: 2+  L pedal: 2+       Disability:        GCS: Eye: 4; Verbal: 5 Motor: 6 Total: 15       Right Pupil: 3 mm;  round;  reactive         Left Pupil:  3 mm;  round;  reactive      R Motor Strength L Motor Strength    R : 5/5  R dorsiflex: 5/5  R plantarflex: 5/5 L : 5/5  L dorsiflex: 5/5  L plantarflex: 5/5        Sensory:  No sensory deficit  Exposure:       Completed: Yes      Secondary Survey:  Physical Exam  Vitals reviewed  Constitutional:       General: She is awake  She is not in acute distress  Appearance: Normal appearance  She is well-developed  She is not toxic-appearing or diaphoretic  Interventions: Cervical collar in place  HENT:      Head: Normocephalic  Jaw: There is normal jaw occlusion  Comments: 1cm laceration forehead    No Payne sign, no raccoon's eyes    No facial instability, jaw malocclusion     Right Ear: Tympanic membrane normal       Left Ear: There is impacted cerumen  Nose: Nose normal       Mouth/Throat:      Mouth: Mucous membranes are moist    Eyes:      Extraocular Movements: Extraocular movements intact  Pupils: Pupils are equal, round, and reactive to light  Neck:      Comments: Patient in C collar   Cardiovascular:      Rate and Rhythm: Normal rate and regular rhythm  Pulses: Normal pulses  Heart sounds: Normal heart sounds  Pulmonary:      Effort: Pulmonary effort is normal  No respiratory distress        Breath sounds: Normal air entry  Wheezing (Diffuse, mild) present  Abdominal:      General: There is no distension  Palpations: Abdomen is soft  Tenderness: There is no abdominal tenderness  There is no right CVA tenderness, left CVA tenderness, guarding or rebound  Genitourinary:     General: Normal vulva  Rectum: Normal    Musculoskeletal:         General: No deformity  Cervical back: Neck supple  Comments: No tenderness to palpation of the bilateral shoulders, elbows, arms, thighs, knees, legs  No chest wall or pelvic tenderness to palpation   No midline C, T, L spine tenderness to palpation    Skin:     General: Skin is warm and dry  Capillary Refill: Capillary refill takes less than 2 seconds  Neurological:      General: No focal deficit present  Mental Status: She is alert and oriented to person, place, and time  GCS: GCS eye subscore is 4  GCS verbal subscore is 5  GCS motor subscore is 6  Sensory: No sensory deficit  Comments: Patient is speaking clearly in complete sentences  Patient is answering appropriately and able follow commands  Patient is moving all four extremities spontaneously  No facial droop  Tongue midline  5/5 strength in all 4 extremities  Sensation to light touch intact in all 4 extremities   Psychiatric:         Mood and Affect: Mood normal          Behavior: Behavior normal          Invasive Devices     Peripheral Intravenous Line  Duration           Peripheral IV 03/17/23 Dorsal (posterior); Right Hand <1 day              Lab Results: Results: I have personally reviewed all pertinent laboratory/tests results    Imaging Results: I have personally reviewed pertinent reports      Chest Xray(s): negative for acute findings   FAST exam(s): negative for acute findings   CT Scan(s): pending   Additional Xray(s): N/A     Other Studies: na    Code Status: No Order  Advance Directive and Living Will:      Power of :    POLST:    I have spent 30 minutes with Patient and family today in which greater than 50% of this time was spent in counseling/coordination of care regarding Diagnostic results, Prognosis, Risks and benefits of tx options, Instructions for management and Patient and family education

## 2023-03-17 NOTE — ED PROVIDER NOTES
Emergency Department Airway Evaluation and Management Form    History  Obtained from: none  Review of patient's allergies indicates no known allergies  Chief Complaint:  Trauma Alert    HPI: Pt is a 80 y o  female presents s/p fall, +head strike  Trauma to head noted  Will need wound closure       I have reviewed and agree with the history as documented  Physical Exam    Vitals:    03/17/23 1236   BP: 147/98   Pulse: 88   Resp: 18   Temp: (!) 97 4 °F (36 3 °C)   SpO2: 95%     Supplemental Oxygen:none    GCS: 15    Neuro: Alert and oriented  Psych: not combative, not anxious, cooperative for exam  Neck: In collar, No JVD, No midline tenderness  Cardio:  Normal  Respiratory: Normal  Mouth:  Normal  Pharynx: Normal    Monitor:  NSR      ED Medications      Current Facility-Administered Medications:   •  tetanus-diphtheria-acellular pertussis (BOOSTRIX) IM injection 0 5 mL, 0 5 mL, Intramuscular, Once, Srikanth Eubanks PA-C  No current outpatient medications on file        Intubation    No intubation required    Final Diagnosis:  Fall, head strike  +AC    ED Provider  Electronically Signed by         Steve Rodriguez MD  03/17/23 [phone]

## 2023-03-17 NOTE — PROCEDURES
Laceration repair    Date/Time: 3/17/2023 1:10 PM  Performed by: Doe Whittaker PA-C  Authorized by: Doe Whittaker PA-C   Consent: Verbal consent obtained  Risks and benefits: risks, benefits and alternatives were discussed  Consent given by: patient  Patient understanding: patient states understanding of the procedure being performed  Patient identity confirmed: verbally with patient  Time out: Immediately prior to procedure a "time out" was called to verify the correct patient, procedure, equipment, support staff and site/side marked as required  Body area: head/neck  Location details: forehead  Laceration length: 1 cm  Foreign bodies: no foreign bodies  Tendon involvement: none  Nerve involvement: none  Vascular damage: no  Anesthesia method: none needed      Sedation:  Patient sedated: no        Procedure Details:  Irrigation solution: saline  Irrigation method: jet lavage and syringe  Amount of cleaning: standard  Debridement: none  Degree of undermining: none  Skin closure: glue  Approximation: close  Approximation difficulty: simple  Patient tolerance: patient tolerated the procedure well with no immediate complications

## 2023-03-17 NOTE — PROCEDURES
POC FAST US    Date/Time: 3/17/2023 12:41 PM  Performed by: Bhaskar Rodriguez PA-C  Authorized by: Bhaskar Rodriguez PA-C     Patient location:  ED and Trauma  Other Assisting Provider: Yes (comment) (Performed by Dr Lesly Arenas and Nathan Vaca AP student with Dr Cheyenne Blanco to interpret)    Procedure details:     Exam Type:  Diagnostic    Indications: blunt abdominal trauma and blunt chest trauma      Technique: FAST      Views obtained:  Heart - Pericardial sac, RUQ - Pascual's Pouch, LUQ - Splenorenal space and Suprapubic - Pouch of Len    Image quality: diagnostic      Image availability:  Images available in PACS  FAST Findings:     RUQ (Hepatorenal) free fluid: absent      LUQ (Splenorenal) free fluid: absent      Suprapubic free fluid: absent      Cardiac wall motion: identified      Pericardial effusion: absent    Interpretation:     Impressions: negative

## 2023-05-19 DIAGNOSIS — M48.061 SPINAL STENOSIS OF LUMBAR REGION, UNSPECIFIED WHETHER NEUROGENIC CLAUDICATION PRESENT: ICD-10-CM

## 2023-05-19 RX ORDER — GABAPENTIN 300 MG/1
CAPSULE ORAL
Qty: 30 CAPSULE | Refills: 2 | Status: SHIPPED | OUTPATIENT
Start: 2023-05-19

## 2023-05-19 NOTE — TELEPHONE ENCOUNTER
Received request from Tameka cannon [place] indicating she has 2 doses left  Patient has appt 5/23

## 2023-05-23 ENCOUNTER — OFFICE VISIT (OUTPATIENT)
Dept: FAMILY MEDICINE CLINIC | Facility: CLINIC | Age: 88
End: 2023-05-23

## 2023-05-23 VITALS
OXYGEN SATURATION: 99 % | DIASTOLIC BLOOD PRESSURE: 82 MMHG | HEART RATE: 98 BPM | SYSTOLIC BLOOD PRESSURE: 138 MMHG | HEIGHT: 58 IN | WEIGHT: 128.6 LBS | BODY MASS INDEX: 26.99 KG/M2 | TEMPERATURE: 98.4 F

## 2023-05-23 DIAGNOSIS — E03.9 ACQUIRED HYPOTHYROIDISM: Primary | ICD-10-CM

## 2023-05-23 DIAGNOSIS — I10 PRIMARY HYPERTENSION: ICD-10-CM

## 2023-05-23 DIAGNOSIS — N39.46 MIXED STRESS AND URGE URINARY INCONTINENCE: ICD-10-CM

## 2023-05-23 DIAGNOSIS — H90.3 SENSORINEURAL HEARING LOSS (SNHL) OF BOTH EARS: ICD-10-CM

## 2023-05-23 RX ORDER — CITALOPRAM 10 MG/1
TABLET ORAL
COMMUNITY
Start: 2023-05-07

## 2023-05-23 RX ORDER — GABAPENTIN 100 MG/1
CAPSULE ORAL
COMMUNITY
Start: 2023-05-07

## 2023-05-23 NOTE — PROGRESS NOTES
Assessment/Plan: Patient have laboratory studies done for fatigue as well as other chronic conditions  Patient will be referred to ENT for hearing loss  Patient will stop gabapentin 100 mg in the morning  Due to fatigue  Patient will have UA CNS done at this time due to urinary frequency and urgency  To consider medication if urinalysis negative  Patient will follow-up in 3 months or as needed  Refills will be given when needed  Diagnoses and all orders for this visit:    Acquired hypothyroidism  -     CBC and differential; Future  -     Comprehensive metabolic panel; Future  -     Lipid panel; Future  -     TSH, 3rd generation with Free T4 reflex; Future    Sensorineural hearing loss (SNHL) of both ears  -     Ambulatory Referral to Otolaryngology; Future  -     CBC and differential; Future  -     Comprehensive metabolic panel; Future  -     Lipid panel; Future  -     TSH, 3rd generation with Free T4 reflex; Future    Mixed stress and urge urinary incontinence  -     CBC and differential; Future  -     Comprehensive metabolic panel; Future  -     Lipid panel; Future  -     TSH, 3rd generation with Free T4 reflex; Future    Primary hypertension  -     CBC and differential; Future  -     Comprehensive metabolic panel; Future  -     Lipid panel; Future  -     TSH, 3rd generation with Free T4 reflex; Future    Other orders  -     gabapentin (NEURONTIN) 100 mg capsule  -     citalopram (CeleXA) 10 mg tablet            Subjective:        Patient ID: Yadira Dang is a 80 y o  female  Patient is here to follow-up on hypothyroidism, hypertension  Patient with decreased hearing  Patient also with urinary incontinence  Patient also with increased fatigue  Concerned about gabapentin during the morning  Patient otherwise in normal state of health          The following portions of the patient's history were reviewed and updated as appropriate: allergies, current medications, past family history, past medical "history, past social history, past surgical history and problem list       Review of Systems   Constitutional: Positive for fatigue  HENT: Positive for hearing loss  Eyes: Negative  Respiratory: Negative  Cardiovascular: Negative  Gastrointestinal: Negative  Endocrine: Negative  Genitourinary: Positive for frequency and urgency  Musculoskeletal: Positive for gait problem  Skin: Negative  Allergic/Immunologic: Negative  Hematological: Negative  Psychiatric/Behavioral: Negative  Objective:        Depression Screening and Follow-up Plan: Patient was screened for depression during today's encounter  They screened negative with a PHQ-2 score of 0  Urinary Incontinence Plan of Care: counseling topics discussed: practice Kegel (pelvic floor strengthening) exercises  /82 (BP Location: Right arm, Patient Position: Sitting, Cuff Size: Standard)   Pulse 98   Temp 98 4 °F (36 9 °C) (Temporal)   Ht 4' 9 5\" (1 461 m)   Wt 58 3 kg (128 lb 9 6 oz)   SpO2 99%   BMI 27 35 kg/m²          Physical Exam  Vitals and nursing note reviewed  Constitutional:       General: She is not in acute distress  Appearance: Normal appearance  She is not ill-appearing, toxic-appearing or diaphoretic  HENT:      Head: Normocephalic and atraumatic  Right Ear: Tympanic membrane, ear canal and external ear normal  There is no impacted cerumen  Left Ear: Tympanic membrane, ear canal and external ear normal  There is no impacted cerumen  Ears:      Comments: Decreased hearing bilaterally     Nose: Nose normal  No congestion or rhinorrhea  Mouth/Throat:      Mouth: Mucous membranes are moist       Pharynx: No oropharyngeal exudate or posterior oropharyngeal erythema  Eyes:      General: No scleral icterus  Right eye: No discharge  Left eye: No discharge  Extraocular Movements: Extraocular movements intact        Conjunctiva/sclera: " Conjunctivae normal       Pupils: Pupils are equal, round, and reactive to light  Neck:      Vascular: No carotid bruit  Cardiovascular:      Rate and Rhythm: Normal rate and regular rhythm  Pulses: Normal pulses  Heart sounds: Normal heart sounds  No murmur heard  No friction rub  No gallop  Pulmonary:      Effort: Pulmonary effort is normal  No respiratory distress  Breath sounds: Normal breath sounds  No stridor  No wheezing, rhonchi or rales  Chest:      Chest wall: No tenderness  Musculoskeletal:         General: No swelling, tenderness, deformity or signs of injury  Cervical back: Normal range of motion and neck supple  No rigidity  No muscular tenderness  Right lower leg: Edema present  Left lower leg: Edema present  Lymphadenopathy:      Cervical: No cervical adenopathy  Skin:     General: Skin is warm and dry  Capillary Refill: Capillary refill takes less than 2 seconds  Coloration: Skin is not jaundiced  Findings: No bruising, erythema, lesion or rash  Neurological:      Mental Status: She is alert and oriented to person, place, and time  Mental status is at baseline  Cranial Nerves: No cranial nerve deficit  Sensory: No sensory deficit  Motor: No weakness  Coordination: Coordination normal       Gait: Gait normal    Psychiatric:         Mood and Affect: Mood normal          Behavior: Behavior normal          Thought Content:  Thought content normal          Judgment: Judgment normal

## 2023-07-05 DIAGNOSIS — E03.9 HYPOTHYROIDISM, UNSPECIFIED TYPE: ICD-10-CM

## 2023-07-05 DIAGNOSIS — J45.30 MILD PERSISTENT ASTHMA, UNSPECIFIED WHETHER COMPLICATED: ICD-10-CM

## 2023-07-05 DIAGNOSIS — I10 HYPERTENSION, UNSPECIFIED TYPE: ICD-10-CM

## 2023-07-05 RX ORDER — ALBUTEROL SULFATE 90 UG/1
2 AEROSOL, METERED RESPIRATORY (INHALATION) EVERY 4 HOURS PRN
Qty: 18 G | Refills: 3 | Status: SHIPPED | OUTPATIENT
Start: 2023-07-05

## 2023-08-29 ENCOUNTER — OFFICE VISIT (OUTPATIENT)
Dept: FAMILY MEDICINE CLINIC | Facility: CLINIC | Age: 88
End: 2023-08-29
Payer: MEDICARE

## 2023-08-29 VITALS
HEART RATE: 73 BPM | BODY MASS INDEX: 28 KG/M2 | OXYGEN SATURATION: 98 % | TEMPERATURE: 97.9 F | HEIGHT: 58 IN | WEIGHT: 133.4 LBS | DIASTOLIC BLOOD PRESSURE: 68 MMHG | SYSTOLIC BLOOD PRESSURE: 122 MMHG

## 2023-08-29 DIAGNOSIS — E03.9 ACQUIRED HYPOTHYROIDISM: Primary | ICD-10-CM

## 2023-08-29 DIAGNOSIS — I48.19 PERSISTENT ATRIAL FIBRILLATION (HCC): ICD-10-CM

## 2023-08-29 DIAGNOSIS — I50.32 CHRONIC DIASTOLIC CHF (CONGESTIVE HEART FAILURE) (HCC): ICD-10-CM

## 2023-08-29 DIAGNOSIS — I10 PRIMARY HYPERTENSION: ICD-10-CM

## 2023-08-29 DIAGNOSIS — N18.30 STAGE 3 CHRONIC KIDNEY DISEASE, UNSPECIFIED WHETHER STAGE 3A OR 3B CKD (HCC): ICD-10-CM

## 2023-08-29 DIAGNOSIS — I48.91 NEW ONSET ATRIAL FIBRILLATION (HCC): ICD-10-CM

## 2023-08-29 PROCEDURE — 99214 OFFICE O/P EST MOD 30 MIN: CPT | Performed by: FAMILY MEDICINE

## 2023-08-29 RX ORDER — ACETAMINOPHEN 325 MG/1
TABLET ORAL
COMMUNITY
Start: 2023-08-07

## 2023-08-29 RX ORDER — ASPIRIN 81 MG
TABLET,CHEWABLE ORAL
COMMUNITY
Start: 2023-08-16

## 2023-08-29 NOTE — PROGRESS NOTES
Assessment/Plan: CMP CBC TSH lipid profile reviewed at this time. Patient will continue with vitamin supplementation and will continue with current regimen for hypertension and hypothyroidism CHF A-fib. Patient will stop gabapentin due to fatigue. We will see if this helps with both fatigue and lower extremity edema. Patient will see cardiology for further work-up and follow-up regarding A-fib and CHF. Other guidance given at this time. Follow-up in 3 months or as needed. Diagnoses and all orders for this visit:    Acquired hypothyroidism    Primary hypertension    Chronic diastolic CHF (congestive heart failure) (HCC)    Other orders  -     Aspirin Low Dose 81 MG chewable tablet  -     acetaminophen (TYLENOL) 325 mg tablet            Subjective:        Patient ID: Abigail Cisneros is a 80 y.o. female. Patient is here to follow-up on A-fib, CHF, hypertension and hypothyroidism. Patient with ongoing fatigue. Patient does have some lower extremity edema. Patient was unable to get in the cardiology yet. No other new chest pain or shortness of breath or difficulty with defecation. The following portions of the patient's history were reviewed and updated as appropriate: allergies, current medications, past family history, past medical history, past social history, past surgical history and problem list.      Review of Systems   Constitutional: Positive for fatigue. HENT: Negative. Eyes: Negative. Respiratory: Negative. Cardiovascular: Positive for leg swelling. Gastrointestinal: Negative. Endocrine: Negative. Genitourinary: Negative. Musculoskeletal: Negative. Skin: Negative. Allergic/Immunologic: Negative. Neurological: Negative. Hematological: Negative. Psychiatric/Behavioral: Negative.             Objective:               /68 (BP Location: Right arm, Patient Position: Sitting, Cuff Size: Standard)   Pulse 73   Temp 97.9 °F (36.6 °C) (Temporal)   Ht 4' 9.5" (1.461 m)   Wt 60.5 kg (133 lb 6.4 oz)   SpO2 98%   BMI 28.37 kg/m²          Physical Exam  Vitals and nursing note reviewed. Constitutional:       General: She is not in acute distress. Appearance: Normal appearance. She is not ill-appearing, toxic-appearing or diaphoretic. HENT:      Head: Normocephalic and atraumatic. Right Ear: Tympanic membrane, ear canal and external ear normal. There is no impacted cerumen. Left Ear: Tympanic membrane, ear canal and external ear normal. There is no impacted cerumen. Nose: Nose normal. No congestion or rhinorrhea. Mouth/Throat:      Mouth: Mucous membranes are moist.      Pharynx: No oropharyngeal exudate or posterior oropharyngeal erythema. Eyes:      General: No scleral icterus. Right eye: No discharge. Left eye: No discharge. Neck:      Vascular: No carotid bruit. Cardiovascular:      Rate and Rhythm: Normal rate. Rhythm irregular. Pulses: Normal pulses. Heart sounds: Normal heart sounds. No murmur heard. No friction rub. No gallop. Pulmonary:      Effort: Pulmonary effort is normal. No respiratory distress. Breath sounds: Normal breath sounds. No stridor. No wheezing, rhonchi or rales. Chest:      Chest wall: No tenderness. Musculoskeletal:         General: No swelling, tenderness, deformity or signs of injury. Normal range of motion. Cervical back: Normal range of motion and neck supple. No rigidity. No muscular tenderness. Right lower leg: Edema present. Left lower leg: Edema present. Lymphadenopathy:      Cervical: No cervical adenopathy. Skin:     General: Skin is warm and dry. Capillary Refill: Capillary refill takes less than 2 seconds. Coloration: Skin is not jaundiced. Findings: No bruising, erythema, lesion or rash. Neurological:      Mental Status: She is alert and oriented to person, place, and time. Mental status is at baseline.       Cranial Nerves: No cranial nerve deficit. Sensory: No sensory deficit. Motor: No weakness. Coordination: Coordination normal.      Gait: Gait normal.   Psychiatric:         Mood and Affect: Mood normal.         Behavior: Behavior normal.         Thought Content:  Thought content normal.         Judgment: Judgment normal.

## 2023-11-14 ENCOUNTER — TELEPHONE (OUTPATIENT)
Dept: FAMILY MEDICINE CLINIC | Facility: CLINIC | Age: 88
End: 2023-11-14

## 2023-11-14 NOTE — TELEPHONE ENCOUNTER
Outgoing call to [facility] daughter, Dr Wilson Worrell was wondering if sandra is still taking the elquis and per Daughter she is not since fall and brain bleed. Patient was taken off of that. I contacted [facility] as well and they confirmed patient is not taking elquis it was d/c at this time.

## 2023-12-11 ENCOUNTER — OFFICE VISIT (OUTPATIENT)
Dept: FAMILY MEDICINE CLINIC | Facility: CLINIC | Age: 88
End: 2023-12-11
Payer: MEDICARE

## 2023-12-11 VITALS
HEIGHT: 56 IN | HEART RATE: 80 BPM | TEMPERATURE: 97.8 F | OXYGEN SATURATION: 95 % | RESPIRATION RATE: 21 BRPM | DIASTOLIC BLOOD PRESSURE: 78 MMHG | SYSTOLIC BLOOD PRESSURE: 122 MMHG | BODY MASS INDEX: 28.3 KG/M2 | WEIGHT: 125.8 LBS

## 2023-12-11 DIAGNOSIS — I10 PRIMARY HYPERTENSION: ICD-10-CM

## 2023-12-11 DIAGNOSIS — Z00.00 MEDICARE ANNUAL WELLNESS VISIT, SUBSEQUENT: ICD-10-CM

## 2023-12-11 DIAGNOSIS — E03.9 HYPOTHYROIDISM, UNSPECIFIED TYPE: Primary | ICD-10-CM

## 2023-12-11 DIAGNOSIS — I48.19 PERSISTENT ATRIAL FIBRILLATION (HCC): ICD-10-CM

## 2023-12-11 PROCEDURE — 99214 OFFICE O/P EST MOD 30 MIN: CPT | Performed by: FAMILY MEDICINE

## 2023-12-11 PROCEDURE — G0439 PPPS, SUBSEQ VISIT: HCPCS | Performed by: FAMILY MEDICINE

## 2023-12-11 RX ORDER — POTASSIUM CHLORIDE 1500 MG/1
TABLET, EXTENDED RELEASE ORAL
COMMUNITY
Start: 2023-10-22

## 2023-12-11 RX ORDER — B-COMPLEX WITH VITAMIN C
TABLET ORAL
COMMUNITY
Start: 2023-10-12

## 2023-12-11 RX ORDER — ECHINACEA PURPUREA EXTRACT 125 MG
1 TABLET ORAL AS NEEDED
COMMUNITY

## 2023-12-11 RX ORDER — SODIUM CHLORIDE/ALOE VERA
1 GEL (GRAM) NASAL
COMMUNITY

## 2023-12-11 NOTE — PATIENT INSTRUCTIONS

## 2023-12-11 NOTE — PROGRESS NOTES
Assessment and Plan:     Problem List Items Addressed This Visit       Hypertension    Hypothyroidism - Primary    Atrial fibrillation [facility])     Other Visit Diagnoses       Medicare annual wellness visit, subsequent            Medicare wellness exam done at this time. Patient will continue with current regimen for other chronic conditions such as hypertension hypothyroidism A-fib. Patient back on Eliquis status post fall with bleeding into her brain. She was cleared by neurology. Patient vaccines reviewed. Patient may consider RSV vaccine. Other guidance given at this time. Refills will be given when needed. Patient will follow-up in 3 months or as needed. Depression Screening and Follow-up Plan: Patient was screened for depression during today's encounter. They screened negative with a PHQ-2 score of 1. Preventive health issues were discussed with patient, and age appropriate screening tests were ordered as noted in patient's After Visit Summary. Personalized health advice and appropriate referrals for health education or preventive services given if needed, as noted in patient's After Visit Summary. History of Present Illness:     Patient presents for a Medicare Wellness Visit    Patient is here to follow-up on hypertension hypothyroidism atrial fibrillation as well as Medicare wellness exam.  Patient status post hospitalization for fall with bleeding into her brain. Patient did see cardiology and neurology. Eliquis just restarted. No other new issues at this time. Patient Care Team:  Livier Neely DO as PCP - General (Family Medicine)  DO Livier Sterling DO (Family Medicine)     Review of Systems:     Review of Systems   Constitutional: Negative. HENT: Negative. Eyes: Negative. Respiratory: Negative. Cardiovascular: Negative. Gastrointestinal: Negative. Endocrine: Negative. Genitourinary: Negative. Musculoskeletal: Negative.     Skin: Negative. Allergic/Immunologic: Negative. Neurological:         Balance issues   Hematological: Negative. Psychiatric/Behavioral: Negative.         Problem List:     Patient Active Problem List   Diagnosis    Anemia    Asthma    Atherosclerosis of native artery of both lower extremities with intermittent claudication (HCC)    Carotid atherosclerosis    Complete tear of right rotator cuff    Disc degeneration, lumbar    GERD without esophagitis    Glaucoma    Hyperlipidemia    Hypertension    Hypothyroidism    Lumbar canal stenosis    Peripheral neuropathy    Spondylosis of lumbar region without myelopathy or radiculopathy    Incomplete tear of left rotator cuff    Trigger little finger of left hand    Slow transit constipation    Carpal tunnel syndrome of left wrist    Carpal tunnel syndrome, left    Preop cardiovascular exam    Cellulitis of toe of right foot    Age-related osteoporosis without current pathological fracture    Lumbar back pain    Trigger ring finger of left hand    Trigger finger, right ring finger    Trigger finger, left little finger    Shortness of breath    Primary osteoarthritis of right knee    Mixed stress and urge urinary incontinence    Hiatal hernia    Dermatitis    Urge incontinence of urine    Lower extremity edema    Preop examination    Nasal polyposis    Right facial swelling    Chronic diastolic CHF (congestive heart failure) (HCC)    Continuous opioid dependence ([address])    New onset atrial fibrillation (HCC)    Atrial fibrillation (HCC)    Stage 3 chronic kidney disease, unspecified whether stage 3a or 3b CKD ([address])    Anxiety    Hair loss    Pain in both lower extremities    Sensorineural hearing loss (SNHL) of both ears      Past Medical and Surgical History:     Past Medical History:   Diagnosis Date    Anxiety     Arthritis     OA    Asthma     good control    Burn     Chronic pain disorder     spinal stenosis    GERD (gastroesophageal reflux disease)     off and on Glaucoma     Hiatal hernia     HLD (hyperlipidemia)     Hx of fall 2015    Hypertension     PVD (peripheral vascular disease) (HCC)     Thyroid disease     hypo     Past Surgical History:   Procedure Laterality Date    ANGIOPLASTY      jacquelyn lower legs    BLADDER SUSPENSION      with mesh    CATARACT EXTRACTION Bilateral     COLONOSCOPY      COLONOSCOPY W/ POLYPECTOMY      via Colostomy; Pt does not have colostomy - no listing for a polypectomy of colon per [employer]    DILATION AND CURETTAGE OF UTERUS      NASAL POLYP EXCISION      Nasal Endoscopy Polypectomy    OTHER SURGICAL HISTORY  04/2007    Uterine prolapse and repair     CA NEUROPLASTY &/TRANSPOS MEDIAN NRV CARPAL TUNNE Left 1/10/2019    Procedure: RELEASE LEFT CARPAL TUNNEL;  Surgeon: Patrick Lockett MD;  Location: [address] [place] [address] MAIN OR;  Service: Orthopedics      Family History:     Family History   Problem Relation Age of Onset    Other Family         Back Pain     Hypertension Family     Thyroid disease Family         Disorder      Social History:     Social History     Socioeconomic History    Marital status:      Spouse name: None    Number of children: None    Years of education: None    Highest education level: None   Occupational History    None   Tobacco Use    Smoking status: Never    Smokeless tobacco: Never   Vaping Use    Vaping Use: Never used   Substance and Sexual Activity    Alcohol use:  Yes     Alcohol/week: 1.0 standard drink of alcohol     Types: 1 Glasses of wine per week     Comment: rare    Drug use: No    Sexual activity: Never   Other Topics Concern    None   Social History Narrative    None     Social Determinants of Health     Financial Resource Strain: Low Risk  (12/11/2023)    Overall Financial Resource Strain (CARDIA)     Difficulty of Paying Living Expenses: Not hard at all   Food Insecurity: No Food Insecurity (2/25/2022)    Hunger Vital Sign     Worried About Running Out of Food in the Last Year: Never true     Ran Out of Food in the Last Year: Never true   Transportation Needs: No Transportation Needs (12/11/2023)    PRAPARE - Transportation     Lack of Transportation (Medical): No     Lack of Transportation (Non-Medical): No   Physical Activity: Not on file   Stress: Not on file   Social Connections: Not on file   Intimate Partner Violence: Not on file   Housing Stability: Low Risk  (2/25/2022)    Housing Stability Vital Sign     Unable to Pay for Housing in the Last Year: No     Number of Places Lived in the Last Year: 1     Unstable Housing in the Last Year: No      Medications and Allergies:     Current Outpatient Medications   Medication Sig Dispense Refill    acetaminophen (TYLENOL) 325 mg tablet       albuterol (Ventolin HFA) 90 mcg/act inhaler Inhale 2 puffs every 4 (four) hours as needed for wheezing Patient may keep in room with her 18 g 3    Alphagan P 0.1 % Administer 1 drop to both eyes daily      B Complex Vitamins (Vitamin B Complex) TABS daily      brimonidine tartrate 0.2 % ophthalmic solution Administer 1 drop to both eyes 2 (two) times a day      busPIRone (BUSPAR) 7.5 mg tablet TAKE 1 TABLET (7.5 MG TOTAL) BY MOUTH 2 (TWO) TIMES A DAY 60 tablet 4    citalopram (CeleXA) 10 mg tablet 10 mg daily      DULoxetine (Cymbalta) 60 mg delayed release capsule Take 1 capsule (60 mg total) by mouth daily 90 capsule 1    Eliquis 2.5 MG TAKE 1 TABLET (2.5 MG TOTAL) BY MOUTH 2 (TWO) TIMES A DAY 60 tablet 10    fluticasone-salmeterol (ADVAIR, WIXELA) 250-50 mcg/dose inhaler Inhale 1 puff 2 (two) times a day Rinse mouth after use.  1 Inhaler 2    latanoprost (XALATAN) 0.005 % ophthalmic solution Administer 1 drop to both eyes daily at bedtime        levothyroxine 50 mcg tablet Take 1 tablet (50 mcg total) by mouth daily 90 tablet 1    lisinopril (ZESTRIL) 20 mg tablet Take 1 tablet (20 mg total) by mouth daily 90 tablet 1    loratadine (CLARITIN) 10 mg tablet Take 1 tablet (10 mg total) by mouth daily 90 tablet 3    metoprolol tartrate (LOPRESSOR) 25 mg tablet       Potassium Chloride ER 20 MEQ TBCR daily      rosuvastatin (CRESTOR) 10 MG tablet 10 mg daily Once at bedtime for hyperlipidemia.      sodium chloride (AYR SALINE NASAL) nasal gel 1 Application into each nostril every hour as needed Apply to nostrils BID      sodium chloride (Deep Sea Nasal Spray) 0.65 % nasal spray 1 spray into each nostril as needed for congestion      torsemide (DEMADEX) 10 mg tablet TAKE 1 TABLET (10 MG TOTAL) BY MOUTH DAILY (Patient taking differently: Take 20 mg by mouth daily Take 1.5 tablets  (30mg) daily) 30 tablet 10    Vitamin D High Potency 25 MCG (1000 UT) capsule GIVE 1 CAP BY MOUTH EVERY DAY DX: SUPPLEMENT 30 capsule 10    CALCIUM PO Take by mouth (Patient not taking: Reported on 5/18/2022)       No current facility-administered medications for this visit. Allergies   Allergen Reactions    Cephalexin Nausea Only    Erythromycin     Phenazopyridine     Piroxicam     Sulfamethoxazole-Trimethoprim       Immunizations:     Immunization History   Administered Date(s) Administered    COVID-19 Pfizer Vac BIVALENT Michael-sucrose 12 Yr+ IM 09/15/2022, 10/23/2023    COVID-19 Pfizer vac (Michael-sucrose, gray cap) 12 yr+ IM 02/10/2022    INFLUENZA 10/20/2021, 10/09/2022, 10/23/2023    Influenza Split High Dose Preservative Free IM 10/27/2016, 10/31/2019    Influenza, high dose seasonal 0.7 mL 09/25/2020    Pneumococcal Conjugate 13-Valent 04/10/2014, 12/22/2016    Pneumococcal Conjugate Vaccine 20-valent (Pcv20), Polysace 10/09/2022    Pneumococcal Polysaccharide PPV23 04/15/1996, 01/03/2008, 05/22/2019    Tdap 09/18/2019, 03/17/2023    Tuberculin Skin Test-PPD Intradermal 10/27/2020, 11/03/2020      Health Maintenance: There are no preventive care reminders to display for this patient. There are no preventive care reminders to display for this patient. Medicare Screening Tests and Risk Assessments:     Savanna Aguayo is here for her Subsequent Wellness visit.  Last Medicare Wellness visit information reviewed, patient interviewed, no change since last AWV. Health Risk Assessment:   Patient rates overall health as fair. Patient feels that their physical health rating is same. Patient is satisfied with their life. Eyesight was rated as same. Hearing was rated as same. Patient feels that their emotional and mental health rating is slightly better. Patients states they are never, rarely angry. Patient states they are often unusually tired/fatigued. Pain experienced in the last 7 days has been some. Patient's pain rating has been 5/10. Patient states that she has experienced weight loss or gain in last 6 months. Depression Screening:   PHQ-2 Score: 1      Fall Risk Screening: In the past year, patient has experienced: history of falling in past year    Number of falls: 2 or more  Injured during fall?: Yes    Feels unsteady when standing or walking?: Yes    Worried about falling?: Yes      Urinary Incontinence Screening:   Patient has leaked urine accidently in the last six months. Home Safety:  Patient has trouble with stairs inside or outside of their home. Patient has working smoke alarms and has working carbon monoxide detector. Home safety hazards include: none. Nutrition:   Current diet is Regular. Medications:   Patient is currently taking over-the-counter supplements. OTC medications include: see medication list. Patient is not able to manage medications. Activities of Daily Living (ADLs)/Instrumental Activities of Daily Living (IADLs):   Walk and transfer into and out of bed and chair?: No  Dress and groom yourself?: Yes    Bathe or shower yourself?: Yes    Feed yourself?  Yes  Do your laundry/housekeeping?: No  Manage your money, pay your bills and track your expenses?: Yes  Make your own meals?: No    Do your own shopping?: No    Previous Hospitalizations:   Any hospitalizations or ED visits within the last 12 months?: Yes    How many hospitalizations have you had in the last year?: 3-4    Advance Care Planning:   Living will: Yes    Durable POA for healthcare: Yes    Advanced directive: Yes      Cognitive Screening:   Provider or family/friend/caregiver concerned regarding cognition?: No    PREVENTIVE SCREENINGS      Cardiovascular Screening:    General: Screening Not Indicated, History Lipid Disorder, Risks and Benefits Discussed and Screening Current      Diabetes Screening:     General: Risks and Benefits Discussed and Screening Current      Colorectal Cancer Screening:     General: Screening Not Indicated and Risks and Benefits Discussed      Breast Cancer Screening:     General: Risks and Benefits Discussed      Cervical Cancer Screening:    General: Screening Not Indicated and Risks and Benefits Discussed      Osteoporosis Screening:    General: Screening Not Indicated, History Osteoporosis and Risks and Benefits Discussed      Abdominal Aortic Aneurysm (AAA) Screening:        General: Risks and Benefits Discussed and Screening Not Indicated      Lung Cancer Screening:     General: Screening Not Indicated and Risks and Benefits Discussed      Hepatitis C Screening:    General: Risks and Benefits Discussed and Patient Declines    Screening, Brief Intervention, and Referral to Treatment (SBIRT)    Screening  Typical number of drinks in a day: 0  Typical number of drinks in a week: 0  Interpretation: Low risk drinking behavior. Single Item Drug Screening:  How often have you used an illegal drug (including marijuana) or a prescription medication for non-medical reasons in the past year? never    Single Item Drug Screen Score: 0  Interpretation: Negative screen for possible drug use disorder    Other Counseling Topics:   Regular weightbearing exercise. No results found.      Physical Exam:     /78 (BP Location: Right arm, Patient Position: Sitting, Cuff Size: Standard)   Pulse 80   Temp 97.8 °F (36.6 °C) (Temporal)   Resp 21 Ht 4' 7.5" (1.41 m)   Wt 57.1 kg (125 lb 12.8 oz)   SpO2 95%   BMI 28.71 kg/m²     Physical Exam  Vitals and nursing note reviewed. Constitutional:       General: She is not in acute distress. Appearance: Normal appearance. She is well-developed. She is not ill-appearing, toxic-appearing or diaphoretic. HENT:      Head: Normocephalic and atraumatic. Right Ear: Tympanic membrane, ear canal and external ear normal.      Left Ear: Tympanic membrane, ear canal and external ear normal.      Nose: Nose normal. No congestion or rhinorrhea. Mouth/Throat:      Mouth: Mucous membranes are moist.      Pharynx: No oropharyngeal exudate. Eyes:      General: No scleral icterus. Right eye: No discharge. Left eye: No discharge. Neck:      Thyroid: No thyromegaly. Vascular: No JVD. Trachea: No tracheal deviation. Cardiovascular:      Rate and Rhythm: Normal rate. Rhythm irregular. Pulses: Normal pulses. Heart sounds: Normal heart sounds. No murmur heard. No friction rub. No gallop. Pulmonary:      Effort: Pulmonary effort is normal. No respiratory distress. Breath sounds: Normal breath sounds. No stridor. No wheezing or rales. Chest:      Chest wall: No tenderness. Musculoskeletal:         General: No swelling, tenderness or deformity. Cervical back: Normal range of motion and neck supple. Right lower leg: Edema present. Left lower leg: Edema present. Lymphadenopathy:      Cervical: No cervical adenopathy. Skin:     General: Skin is warm and dry. Capillary Refill: Capillary refill takes less than 2 seconds. Coloration: Skin is not pale. Findings: No erythema or rash. Neurological:      Mental Status: She is alert and oriented to person, place, and time. Mental status is at baseline. Cranial Nerves: No cranial nerve deficit. Sensory: No sensory deficit. Motor: No abnormal muscle tone.       Coordination: Coordination normal.      Gait: Gait abnormal.      Deep Tendon Reflexes: Reflexes normal.   Psychiatric:         Mood and Affect: Mood normal.         Behavior: Behavior normal.         Thought Content:  Thought content normal.         Judgment: Judgment normal.          Xenia Head, DO

## 2024-01-11 ENCOUNTER — TELEPHONE (OUTPATIENT)
Dept: FAMILY MEDICINE CLINIC | Facility: CLINIC | Age: 89
End: 2024-01-11

## 2024-01-11 DIAGNOSIS — R35.0 URINARY FREQUENCY: Primary | ICD-10-CM

## 2024-01-11 NOTE — TELEPHONE ENCOUNTER
[facility] sent over a note stating patient is having frequent urination and they are wondering if we can order a u/a c&s. Ordered were placed and scripts were faxed to [phone].

## 2024-02-14 ENCOUNTER — TELEPHONE (OUTPATIENT)
Age: 89
End: 2024-02-14

## 2024-02-14 NOTE — TELEPHONE ENCOUNTER
Incoming call from Ana Maria Neely,  at [facility] where pt resides.  Stated during a recent state audit, it was found that the DME form sent over (in [employer] for 10/19/23) was incomplete.  Gave examples of no eval date, no height/weight, etc.  They need this completed/addended and sent back as soon as possible to complete their audit with the state.  She is faxing a copy over to [phone], in case it is needed.    Any questions, she can be reached at [phone].

## 2024-02-14 NOTE — TELEPHONE ENCOUNTER
Ana Maria scott [facility] called back in regards the DME form, she states someone from the office called her. Jami Transferred to [place].    DISPLAY PLAN FREE TEXT

## 2024-02-28 ENCOUNTER — DOCUMENTATION (OUTPATIENT)
Dept: FAMILY MEDICINE CLINIC | Facility: CLINIC | Age: 89
End: 2024-02-28

## 2024-03-19 ENCOUNTER — OFFICE VISIT (OUTPATIENT)
Dept: FAMILY MEDICINE CLINIC | Facility: CLINIC | Age: 89
End: 2024-03-19
Payer: MEDICARE

## 2024-03-19 VITALS
TEMPERATURE: 98.6 F | WEIGHT: 125.2 LBS | HEART RATE: 58 BPM | HEIGHT: 56 IN | SYSTOLIC BLOOD PRESSURE: 128 MMHG | OXYGEN SATURATION: 95 % | DIASTOLIC BLOOD PRESSURE: 82 MMHG | BODY MASS INDEX: 28.16 KG/M2

## 2024-03-19 DIAGNOSIS — I48.19 PERSISTENT ATRIAL FIBRILLATION (HCC): ICD-10-CM

## 2024-03-19 DIAGNOSIS — E03.9 ACQUIRED HYPOTHYROIDISM: ICD-10-CM

## 2024-03-19 DIAGNOSIS — I10 PRIMARY HYPERTENSION: ICD-10-CM

## 2024-03-19 DIAGNOSIS — G62.9 PERIPHERAL POLYNEUROPATHY: ICD-10-CM

## 2024-03-19 DIAGNOSIS — N39.46 MIXED STRESS AND URGE URINARY INCONTINENCE: Primary | ICD-10-CM

## 2024-03-19 DIAGNOSIS — N39.41 URGE INCONTINENCE OF URINE: ICD-10-CM

## 2024-03-19 PROCEDURE — G2211 COMPLEX E/M VISIT ADD ON: HCPCS | Performed by: FAMILY MEDICINE

## 2024-03-19 PROCEDURE — 99214 OFFICE O/P EST MOD 30 MIN: CPT | Performed by: FAMILY MEDICINE

## 2024-03-19 RX ORDER — UNDERPADS 23" X 36"
EACH MISCELLANEOUS
Qty: 180 EACH | Refills: 11 | Status: SHIPPED | OUTPATIENT
Start: 2024-03-19 | End: 2024-03-19 | Stop reason: SDUPTHER

## 2024-03-19 RX ORDER — UNDERPADS 23" X 36"
EACH MISCELLANEOUS
Qty: 180 EACH | Refills: 11 | Status: SHIPPED | OUTPATIENT
Start: 2024-03-19

## 2024-03-19 NOTE — PROGRESS NOTES
"Assessment/Plan: Patient will have prescription for depends.  Patient will have UA C&S done at the home.  Patient will continue with other meds for A-fib hypertension hypothyroidism neuropathy.  Continue with rollator.  Strength training with [facility] to be started shortly.  Follow-up in 3 months or as needed.  Labs and records reviewed.       Diagnoses and all orders for this visit:    Mixed stress and urge urinary incontinence    Persistent atrial fibrillation (HCC)    Primary hypertension    Acquired hypothyroidism    Peripheral polyneuropathy            Subjective:        Patient ID: Lena Crabtree is a 89 y.o. female.      Patient here to follow-up on A-fib, hypertension and hypothyroidism, weakness of bilateral lower extremities.  Patient also with urge incontinence.  Patient does wear pads as well as depends.  No chest pain shortness of breath.  Patient has had falls due to weakness.  Patient has been in physical therapy in the past.  Patient does use rollator.          The following portions of the patient's history were reviewed and updated as appropriate: allergies, current medications, past family history, past medical history, past social history, past surgical history and problem list.      Review of Systems   Constitutional: Negative.    HENT: Negative.     Eyes: Negative.    Respiratory: Negative.     Cardiovascular: Negative.    Gastrointestinal: Negative.    Endocrine: Negative.    Genitourinary:         Urine incontinence   Musculoskeletal: Negative.    Skin: Negative.    Allergic/Immunologic: Negative.    Neurological:  Positive for weakness.   Hematological: Negative.    Psychiatric/Behavioral: Negative.             Objective:        Depression Screening and Follow-up Plan: Clincally patient does not have depression. No treatment is required.             /82 (BP Location: Left arm, Patient Position: Sitting, Cuff Size: Standard)   Pulse 58   Temp 98.6 °F (37 °C) (Temporal)   Ht 4' 7.5\" (1.41 " m)   Wt 56.8 kg (125 lb 3.2 oz)   SpO2 95%   BMI 28.58 kg/m²          Physical Exam  Vitals and nursing note reviewed.   Constitutional:       General: She is not in acute distress.     Appearance: Normal appearance. She is not ill-appearing, toxic-appearing or diaphoretic.   HENT:      Head: Normocephalic and atraumatic.      Right Ear: Tympanic membrane, ear canal and external ear normal. There is no impacted cerumen.      Left Ear: Tympanic membrane, ear canal and external ear normal. There is no impacted cerumen.      Nose: Nose normal. No congestion or rhinorrhea.      Mouth/Throat:      Mouth: Mucous membranes are moist.      Pharynx: No oropharyngeal exudate or posterior oropharyngeal erythema.   Eyes:      General: No scleral icterus.        Right eye: No discharge.         Left eye: No discharge.      Conjunctiva/sclera: Conjunctivae normal.   Neck:      Vascular: No carotid bruit.   Cardiovascular:      Rate and Rhythm: Normal rate and regular rhythm.      Pulses: Normal pulses.      Heart sounds: Normal heart sounds. No murmur heard.     No friction rub. No gallop.   Pulmonary:      Effort: Pulmonary effort is normal. No respiratory distress.      Breath sounds: Normal breath sounds. No stridor. No wheezing, rhonchi or rales.   Chest:      Chest wall: No tenderness.   Musculoskeletal:         General: No swelling, tenderness, deformity or signs of injury. Normal range of motion.      Cervical back: Normal range of motion and neck supple. No rigidity. No muscular tenderness.      Right lower leg: No edema.      Left lower leg: No edema.   Lymphadenopathy:      Cervical: No cervical adenopathy.   Skin:     General: Skin is warm and dry.      Capillary Refill: Capillary refill takes less than 2 seconds.      Coloration: Skin is not jaundiced.      Findings: No bruising, erythema, lesion or rash.   Neurological:      Mental Status: She is alert. Mental status is at baseline.      Motor: Weakness present.       Gait: Gait abnormal.   Psychiatric:         Mood and Affect: Mood normal.         Behavior: Behavior normal.         Thought Content: Thought content normal.         Judgment: Judgment normal.

## 2024-03-26 ENCOUNTER — TELEPHONE (OUTPATIENT)
Age: 89
End: 2024-03-26

## 2024-03-26 ENCOUNTER — TELEPHONE (OUTPATIENT)
Dept: FAMILY MEDICINE CLINIC | Facility: CLINIC | Age: 89
End: 2024-03-26

## 2024-03-26 DIAGNOSIS — R62.7 ADULT FAILURE TO THRIVE: Primary | ICD-10-CM

## 2024-03-26 NOTE — TELEPHONE ENCOUNTER
Eran who is with [facility] hosp[facility] is asking if the provider is willing to send over an eval and treat and if appropriate for hospice at this time. Patient resides at [facility] currently. If an order can be written, H&P and med list can be faxed as well. Fax number: [phone] Phone number: [phone].    Patient was seen for an office visit by Dr. Toledo on 03/19/24.

## 2024-03-27 NOTE — TELEPHONE ENCOUNTER
Dg from Hospice care requesting an update on orders for [facility], H&P and medications list.    Fax number [phone].

## 2024-03-27 NOTE — TELEPHONE ENCOUNTER
Was getting an error message trying to fax to [phone], looked a previous note and found fax was incorrect and it is [phone].

## 2024-06-07 ENCOUNTER — TELEPHONE (OUTPATIENT)
Age: 89
End: 2024-06-07

## 2024-06-07 NOTE — TELEPHONE ENCOUNTER
Pt daughter called in cancel the patient upcoming appt and want to convey the message to Dr. Toledo that the patient is at hospice now. Thanks

## 2025-03-08 ENCOUNTER — APPOINTMENT (EMERGENCY)
Dept: RADIOLOGY | Facility: HOSPITAL | Age: OVER 89
End: 2025-03-08
Payer: MEDICARE

## 2025-03-08 ENCOUNTER — HOSPITAL ENCOUNTER (EMERGENCY)
Facility: HOSPITAL | Age: OVER 89
Discharge: HOME | End: 2025-03-09
Attending: EMERGENCY MEDICINE
Payer: MEDICARE

## 2025-03-08 DIAGNOSIS — S00.03XA HEMATOMA OF OCCIPITAL REGION OF SCALP: ICD-10-CM
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
## 2025-03-08 PROCEDURE — 70450 CT HEAD/BRAIN W/O DYE: CPT

## 2025-03-08 PROCEDURE — 99284 EMERGENCY DEPT VISIT MOD MDM: CPT | Performed by: EMERGENCY MEDICINE

## 2025-03-08 PROCEDURE — 72131 CT LUMBAR SPINE W/O DYE: CPT

## 2025-03-08 PROCEDURE — 72125 CT NECK SPINE W/O DYE: CPT

## 2025-03-08 PROCEDURE — 99284 EMERGENCY DEPT VISIT MOD MDM: CPT

## 2025-03-08 RX ORDER — ACETAMINOPHEN 325 MG/1
650 TABLET ORAL ONCE
Status: COMPLETED | OUTPATIENT
Start: 2025-03-09 | End: 2025-03-09

## 2025-03-09 ENCOUNTER — APPOINTMENT (OUTPATIENT)
Dept: RADIOLOGY | Facility: HOSPITAL | Age: OVER 89
End: 2025-03-09
Payer: COMMERCIAL

## 2025-03-09 VITALS
SYSTOLIC BLOOD PRESSURE: 159 MMHG | HEART RATE: 68 BPM | DIASTOLIC BLOOD PRESSURE: 72 MMHG | RESPIRATION RATE: 18 BRPM | OXYGEN SATURATION: 94 % | TEMPERATURE: 98.6 F

## 2025-03-09 PROCEDURE — 73564 X-RAY EXAM KNEE 4 OR MORE: CPT

## 2025-03-09 PROCEDURE — 73610 X-RAY EXAM OF ANKLE: CPT

## 2025-03-09 RX ADMIN — ACETAMINOPHEN 650 MG: 325 TABLET, FILM COATED ORAL at 00:16

## 2025-03-09 NOTE — ED ATTENDING ATTESTATION
3/8/2025  I, Filippo Edmond MD, saw and evaluated the patient. I have discussed the patient with the resident/non-physician practitioner and agree with the resident's/non-physician practitioner's findings, Plan of Care, and MDM as documented in the resident's/non-physician practitioner's note, except where noted. All available labs and Radiology studies were reviewed.  I was present for key portions of any procedure(s) performed by the resident/non-physician practitioner and I was immediately available to provide assistance.       At this point I agree with the current assessment done in the Emergency Department.  I have conducted an independent evaluation of this patient a history and physical is as follows:  Patient presents for evaluation of a fall mechanical bump to her head no loss of consciousness no neck pain she does complain of lower back pain  She is not sure if this is new or old back pain no loss of consciousness no vomiting no focal neurologic symptoms  Exam: Patient is in no acute distress awake alert Glascow coma is 15 there is a small contusion to the occiput  Neck is nontender lungs clear heart regular abdomen soft nontender back there is mild tenderness in the lower lumbar area no step-offs are noted  Full range of motion of her hip full range of motion of her knees and ankles motor strength normal  Impression fall  CT head C-spine CT lumbar spine to rule compression fracture  ED Course         Critical Care Time  Procedures

## 2025-03-09 NOTE — DISCHARGE INSTRUCTIONS
You have been evaluated in the emergency department for a fall with a minor head injury.  At time of evaluation, your CT imaging is negative for any acute intracranial bleeding, fracture, or other abnormality.  In addition the x-rays of your left knee and right ankle were negative for any fractures or dislocation. You have a minor contusion of the posterior left scalp, which should resolve by itself.  Return to the emergency department if you should have any worsening severe headache, nausea or vomiting, visual changes, changes to the way that you walk, speak, or hear, balance changes, gait abnormalities, fever, or for any other concerning signs or symptoms.

## 2025-03-09 NOTE — ED NOTES
Pt awaiting  to return to facility  Transport scheduled for 1225 pm with [facility]       Lottie Hernandes RN  03/09/25 0754       Lottie Hernandes RN  03/09/25 0801       Lottie Hernandes RN  03/09/25 0806

## 2025-03-09 NOTE — ED NOTES
Transportation scheduled for 12:25 PM on 3/9 with special delivery mobility     Alicia Nickerson RN  03/09/25 0565

## 2025-03-12 NOTE — ED PROVIDER NOTES
Time reflects when diagnosis was documented in both MDM as applicable and the Disposition within this note       Time User Action Codes Description Comment    3/9/2025  3:23 AM Julio César Barrera [place] [W19.XXXA] Fall, initial encounter     3/9/2025  3:23 AM Julio César Barrera Add [S00.03XA] Hematoma of frontal scalp, initial encounter     3/9/2025  3:23 AM Julio César Barrera Remove [S00.03XA] Hematoma of frontal scalp, initial encounter     3/9/2025  3:23 AM Julio César Barrera Add [S00.03XA] Hematoma of occipital region of scalp           ED Disposition       ED Disposition   Discharge    Condition   Stable    Date/Time   Sun Mar 9, 2025  3:26 AM    Comment   Lena Crabtree discharge to home/self care.                   Assessment & Plan       Medical Decision Making  Patient is a 89 y.o. female who presents to the ED following a fall.    Vital signs remained stable throughout ED course.  Imaging including CT of head, neck, lumbar spine and x-ray imaging of right knee and left ankle are negative for any acute or concerning findings. Exam as listed below.    Patient has a small left posterior scalp contusion which is not pulsatile or expanding without any overlying injury.  No other obvious injuries on physical exam.    Plan: Patient discharged home.    View ED course above for further discussion on patient workup.     All labs reviewed and utilized in the medical decision making process  All radiology studies independently viewed by me and interpreted by the radiologist.  I reviewed all testing with the patient.         Amount and/or Complexity of Data Reviewed  Radiology: ordered.    Risk  OTC drugs.             Medications   acetaminophen (TYLENOL) tablet 650 mg (650 mg Oral Given 3/9/25 0016)       ED Risk Strat Scores                            SBIRT 20yo+      Flowsheet Row Most Recent Value   Initial Alcohol Screen: US AUDIT-C     1. How often do you have a drink containing alcohol? 0 Filed at: 03/09/2025 0041   2. How many drinks containing  alcohol do you have on a typical day you are drinking?  0 Filed at: 03/09/2025 0041   3b. FEMALE Any Age, or MALE 65+: How often do you have 4 or more drinks on one occassion? 0 Filed at: 03/09/2025 0041   Audit-C Score 0 Filed at: 03/09/2025 0041   CLAUDE: How many times in the past year have you...    Used an illegal drug or used a prescription medication for non-medical reasons? Never Filed at: 03/09/2025 0041                            History of Present Illness       Chief Complaint   Patient presents with    Fall     Pt. Coming from independent living had at fall at 2230 while trying to sit in her wheel chair. +hs unknown loc. Pt has stopped taking thinners for a month.        Past Medical History:   Diagnosis Date    Anxiety     Arthritis     OA    Asthma     good control    Burn     Chronic pain disorder     spinal stenosis    GERD (gastroesophageal reflux disease)     off and on    Glaucoma     Hiatal hernia     HLD (hyperlipidemia)     Hx of fall 2015    Hypertension     PVD (peripheral vascular disease) (HCC)     Thyroid disease     hypo      Past Surgical History:   Procedure Laterality Date    ANGIOPLASTY      jacquelyn lower legs    BLADDER SUSPENSION      with mesh    CATARACT EXTRACTION Bilateral     COLONOSCOPY      COLONOSCOPY W/ POLYPECTOMY      via Colostomy; Pt does not have colostomy - no listing for a polypectomy of colon per [employer]    DILATION AND CURETTAGE OF UTERUS      NASAL POLYP EXCISION      Nasal Endoscopy Polypectomy    OTHER SURGICAL HISTORY  04/2007    Uterine prolapse and repair     MO NEUROPLASTY &/TRANSPOS MEDIAN NRV CARPAL TUNNE Left 1/10/2019    Procedure: RELEASE LEFT CARPAL TUNNEL;  Surgeon: Yogesh Mojica MD;  Location:  MAIN OR;  Service: Orthopedics      Family History   Problem Relation Age of Onset    Other Family         Back Pain     Hypertension Family     Thyroid disease Family         Disorder      Social History     Tobacco Use    Smoking status: Never    Smokeless  tobacco: Never   Vaping Use    Vaping status: Never Used   Substance Use Topics    Alcohol use: Yes     Alcohol/week: 1.0 standard drink of alcohol     Types: 1 Glasses of wine per week     Comment: rare    Drug use: No      E-Cigarette/Vaping    E-Cigarette Use Never User       E-Cigarette/Vaping Substances    Nicotine No     THC No     CBD No     Flavoring No     Other No     Unknown No       I have reviewed and agree with the history as documented.     This is a very pleasant 89-year-old female with relevant past medical history including osteoporosis, atrial fibrillation (patient not currently on Eliquis due to recurrent falls in the last month), lumbar osteo arthritis, presenting to the emergency room following a fall at home.  Patient states that she lost her balance and fell backwards onto the hardwood floor, striking the posterior left scalp on the floor.  She denies any loss of consciousness or subsequent nausea or vomiting, headache, neck pain.  She does endorse some pain to the lumbar spine although is not sure if this is new or old, in addition to new pain of the right knee and left ankle.  She denies any other symptoms and states she feels well overall.      Fall  Associated symptoms: no abdominal pain, no back pain, no chest pain, no seizures and no vomiting        Review of Systems   Constitutional:  Negative for chills and fever.   HENT:  Negative for ear pain and sore throat.    Eyes:  Negative for pain and visual disturbance.   Respiratory:  Negative for cough and shortness of breath.    Cardiovascular:  Negative for chest pain and palpitations.   Gastrointestinal:  Negative for abdominal pain and vomiting.   Genitourinary:  Negative for dysuria and hematuria.   Musculoskeletal:  Negative for arthralgias and back pain.   Skin:  Negative for color change and rash.   Neurological:  Negative for seizures and syncope.   All other systems reviewed and are negative.          Objective       ED Triage  Vitals   Temperature Pulse Blood Pressure Respirations SpO2 Patient Position - Orthostatic VS   03/08/25 2311 03/08/25 2311 03/08/25 2311 03/08/25 2311 03/08/25 2311 03/08/25 2311   98.6 °F (37 °C) 68 (!) 174/69 18 97 % Lying      Temp Source Heart Rate Source BP Location FiO2 (%) Pain Score    03/08/25 2311 03/08/25 2311 03/08/25 2311 -- 03/09/25 0023    Oral Monitor Right arm  5      Vitals      Date and Time Temp Pulse SpO2 Resp BP Pain Score FACES Pain Rating User   03/09/25 1045 -- 68 94 % -- 159/72 No Pain -- MD   03/09/25 0100 -- 68 96 % 18 154/98 -- -- KRISTIN   03/09/25 0023 -- -- -- -- -- 5 -- BK   03/08/25 2311 98.6 °F (37 °C) 68 97 % 18 174/69 -- -- BK            Physical Exam  Vitals and nursing note reviewed.   Constitutional:       General: She is not in acute distress.     Appearance: Normal appearance. She is well-developed and normal weight. She is not ill-appearing or toxic-appearing.   HENT:      Head: Normocephalic and atraumatic.      Right Ear: External ear normal.      Left Ear: External ear normal.      Mouth/Throat:      Mouth: Mucous membranes are moist.      Pharynx: Oropharynx is clear.   Eyes:      General: No scleral icterus.        Right eye: No discharge.         Left eye: No discharge.      Conjunctiva/sclera: Conjunctivae normal.   Neck:      Vascular: No carotid bruit.   Cardiovascular:      Rate and Rhythm: Normal rate and regular rhythm.      Heart sounds: No murmur heard.     No friction rub. No gallop.   Pulmonary:      Effort: Pulmonary effort is normal. No respiratory distress.      Breath sounds: Normal breath sounds. No stridor. No wheezing, rhonchi or rales.   Chest:      Chest wall: No tenderness.   Abdominal:      General: There is no distension.      Palpations: Abdomen is soft. There is no mass.      Tenderness: There is no abdominal tenderness. There is no right CVA tenderness, left CVA tenderness, guarding or rebound.      Hernia: No hernia is present.   Musculoskeletal:          General: No swelling.      Cervical back: Neck supple. No rigidity or tenderness.      Right lower leg: No edema.      Left lower leg: No edema.      Comments: Range of motion full and intact in all joints and no crepitus or bony tenderness on palpation of the cranium, facial bones, jaw, clavicles, scapula, anterior and posterior chest wall, long bones of the upper and lower extremities, pelvis.    No injuries or other concerning findings within the oropharynx.     Pupils equal and reactive to light with full and painless extraocular movements.    Mild midline spinal tenderness of the lumbar spine at the level of L2-L5.         Lymphadenopathy:      Cervical: No cervical adenopathy.   Skin:     General: Skin is warm and dry.      Capillary Refill: Capillary refill takes less than 2 seconds.      Coloration: Skin is not jaundiced or pale.      Findings: No bruising, erythema, lesion or rash.      Comments: 3 cm left posterior scalp contusion that is not expanding or pulsatile and has no overlying skin injury.       Neurological:      General: No focal deficit present.      Mental Status: She is alert and oriented to person, place, and time. Mental status is at baseline.   Psychiatric:         Mood and Affect: Mood normal.         Results Reviewed       None            XR ankle 3+ views LEFT   Final Interpretation by Honorio Glover MD (03/09 1019)      No acute osseous abnormality.         Computerized Assisted Algorithm (CAA) may have been used to analyze all applicable images.               Workstation performed: [id number]         XR knee 4+ vw right injury   Final Interpretation by Honorio Glover MD (03/09 1019)      No acute osseous abnormality.         Computerized Assisted Algorithm (CAA) may have been used to analyze all applicable images.         Workstation performed: [id number]         CT head without contrast   Final Interpretation by Randall Royal MD (03/09 0116)      No acute intracranial  abnormality.                  Workstation performed: [id number]         CT spine cervical without contrast   Final Interpretation by Randall Royal MD (03/09 0129)      No cervical spine fracture or traumatic malalignment.                  Workstation performed: [id number]         CT spine lumbar without contrast   Final Interpretation by Randall Royal MD (03/09 0124)      No acute lumbar spine fracture or subluxation.               Workstation performed: [id number]             Procedures    ED Medication and Procedure Management   Prior to Admission Medications   Prescriptions Last Dose Informant Patient Reported? Taking?   Alphagan P 0.1 %   Yes No   Sig: Administer 1 drop to both eyes daily   B Complex Vitamins (Vitamin B Complex) TABS   Yes No   Sig: daily   CALCIUM PO   Yes No   Sig: Take by mouth   Patient not taking: Reported on 5/18/2022   DULoxetine (Cymbalta) 60 mg delayed release capsule   No No   Sig: Take 1 capsule (60 mg total) by mouth daily   Eliquis 2.5 MG   No No   Sig: TAKE 1 TABLET (2.5 MG TOTAL) BY MOUTH 2 (TWO) TIMES A DAY   Incontinence Supply Disposable (Incontinence Brief Large) MISC   No No   Sig: Use 6 (six) times a day   Potassium Chloride ER 20 MEQ TBCR   Yes No   Sig: daily   Vitamin D High Potency 25 MCG (1000 UT) capsule   No No   Sig: GIVE 1 CAP BY MOUTH EVERY DAY DX: SUPPLEMENT   acetaminophen (TYLENOL) 325 mg tablet   Yes No   albuterol (Ventolin HFA) 90 mcg/act inhaler   No No   Sig: Inhale 2 puffs every 4 (four) hours as needed for wheezing Patient may keep in room with her   brimonidine tartrate 0.2 % ophthalmic solution   Yes No   Sig: Administer 1 drop to both eyes 2 (two) times a day   busPIRone (BUSPAR) 7.5 mg tablet   No No   Sig: TAKE 1 TABLET (7.5 MG TOTAL) BY MOUTH 2 (TWO) TIMES A DAY   citalopram (CeleXA) 10 mg tablet   Yes No   Sig: 10 mg daily   fluticasone-salmeterol (ADVAIR, WIXELA) 250-50 mcg/dose inhaler   No No   Sig: Inhale 1 puff 2 (two) times a day  Rinse mouth after use.   latanoprost (XALATAN) 0.005 % ophthalmic solution   Yes No   Sig: Administer 1 drop to both eyes daily at bedtime     levothyroxine 50 mcg tablet   No No   Sig: Take 1 tablet (50 mcg total) by mouth daily   lisinopril (ZESTRIL) 20 mg tablet   No No   Sig: Take 1 tablet (20 mg total) by mouth daily   loratadine (CLARITIN) 10 mg tablet   No No   Sig: Take 1 tablet (10 mg total) by mouth daily   metoprolol tartrate (LOPRESSOR) 25 mg tablet   Yes No   rosuvastatin (CRESTOR) 10 MG tablet   Yes No   Sig: 10 mg daily Once at bedtime for hyperlipidemia.   sodium chloride (AYR SALINE NASAL) nasal gel   Yes No   Si Application into each nostril every hour as needed Apply to nostrils BID   sodium chloride (Deep Sea Nasal Spray) 0.65 % nasal spray   Yes No   Si spray into each nostril as needed for congestion   torsemide (DEMADEX) 10 mg tablet   No No   Sig: TAKE 1 TABLET (10 MG TOTAL) BY MOUTH DAILY   Patient taking differently: Take 20 mg by mouth daily Take 1.5 tablets  (30mg) daily      Facility-Administered Medications: None     Discharge Medication List as of 3/9/2025  7:53 AM        CONTINUE these medications which have NOT CHANGED    Details   acetaminophen (TYLENOL) 325 mg tablet Starting Mon 2023, Historical Med      albuterol (Ventolin HFA) 90 mcg/act inhaler Inhale 2 puffs every 4 (four) hours as needed for wheezing Patient may keep in room with her, Starting Wed 2023, Normal      Alphagan P 0.1 % Administer 1 drop to both eyes daily, Starting Thu 2021, Historical Med      B Complex Vitamins (Vitamin B Complex) TABS daily, Historical Med      brimonidine tartrate 0.2 % ophthalmic solution Administer 1 drop to both eyes 2 (two) times a day, Historical Med      busPIRone (BUSPAR) 7.5 mg tablet TAKE 1 TABLET (7.5 MG TOTAL) BY MOUTH 2 (TWO) TIMES A DAY, Starting Tue 3/7/2023, Normal      CALCIUM PO Take by mouth, Historical Med      citalopram (CeleXA) 10 mg tablet 10 mg  daily, Starting Sun 5/7/2023, Historical Med      DULoxetine (Cymbalta) 60 mg delayed release capsule Take 1 capsule (60 mg total) by mouth daily, Starting Wed 8/24/2022, Normal      Eliquis 2.5 MG TAKE 1 TABLET (2.5 MG TOTAL) BY MOUTH 2 (TWO) TIMES A DAY, Normal      fluticasone-salmeterol (ADVAIR, WIXELA) 250-50 mcg/dose inhaler Inhale 1 puff 2 (two) times a day Rinse mouth after use., Starting Fri 9/4/2020, Normal      Incontinence Supply Disposable (Incontinence Brief Large) MISC Use 6 (six) times a day, Starting Tue 3/19/2024, Print      latanoprost (XALATAN) 0.005 % ophthalmic solution Administer 1 drop to both eyes daily at bedtime  , Historical Med      levothyroxine 50 mcg tablet Take 1 tablet (50 mcg total) by mouth daily, Starting Fri 9/4/2020, Normal      lisinopril (ZESTRIL) 20 mg tablet Take 1 tablet (20 mg total) by mouth daily, Starting Fri 9/4/2020, Normal      loratadine (CLARITIN) 10 mg tablet Take 1 tablet (10 mg total) by mouth daily, Starting Thu 11/17/2022, Normal      metoprolol tartrate (LOPRESSOR) 25 mg tablet Starting Sun 4/24/2022, Historical Med      Potassium Chloride ER 20 MEQ TBCR daily, Historical Med      rosuvastatin (CRESTOR) 10 MG tablet 10 mg daily Once at bedtime for hyperlipidemia., Starting Mon 4/18/2022, Historical Med      sodium chloride (AYR SALINE NASAL) nasal gel 1 Application into each nostril every hour as needed Apply to nostrils BID, Historical Med      sodium chloride (Deep Sea Nasal Spray) 0.65 % nasal spray 1 spray into each nostril as needed for congestion, Historical Med      torsemide (DEMADEX) 10 mg tablet TAKE 1 TABLET (10 MG TOTAL) BY MOUTH DAILY, Starting Wed 9/22/2021, Normal      Vitamin D High Potency 25 MCG (1000 UT) capsule GIVE 1 CAP BY MOUTH EVERY DAY DX: SUPPLEMENT, Normal           No discharge procedures on file.  ED SEPSIS DOCUMENTATION   Time reflects when diagnosis was documented in both MDM as applicable and the Disposition within this note        Time User Action Codes Description Comment    3/9/2025  3:23 AM Julio César Barrera [W19.XXXA] Fall, initial encounter     3/9/2025  3:23 AM Julio César Barrera [S00.03XA] Hematoma of frontal scalp, initial encounter     3/9/2025  3:23 AM Julio César Barrera [S00.03XA] Hematoma of frontal scalp, initial encounter     3/9/2025  3:23 AM Julio César Barrera [S00.03XA] Hematoma of occipital region of scalp                  Julio César Barrera DO  03/12/25 1544

## 2025-07-02 ENCOUNTER — HOSPITAL ENCOUNTER (EMERGENCY)
Facility: HOSPITAL | Age: OVER 89
Discharge: HOME | End: 2025-07-02
Attending: EMERGENCY MEDICINE
Payer: MEDICARE

## 2025-07-02 ENCOUNTER — APPOINTMENT (EMERGENCY)
Dept: RADIOLOGY | Facility: HOSPITAL | Age: OVER 89
End: 2025-07-02
Payer: MEDICARE

## 2025-07-02 VITALS
RESPIRATION RATE: 20 BRPM | OXYGEN SATURATION: 94 % | SYSTOLIC BLOOD PRESSURE: 147 MMHG | TEMPERATURE: 97.3 F | DIASTOLIC BLOOD PRESSURE: 67 MMHG | HEART RATE: 86 BPM

## 2025-07-02 DIAGNOSIS — S02.2XXA NASAL BONE FRACTURE: ICD-10-CM

## 2025-07-02 DIAGNOSIS — I48.91 A-FIB (HCC): ICD-10-CM

## 2025-07-02 DIAGNOSIS — S09.90XA CLOSED HEAD INJURY, INITIAL ENCOUNTER: ICD-10-CM
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
## 2025-07-02 LAB
ATRIAL RATE: 337 BPM
P AXIS: 178 DEGREES
QRS AXIS: 137 DEGREES
QRSD INTERVAL: 72 MS
QT INTERVAL: 390 MS
QTC INTERVAL: 455 MS
T WAVE AXIS: 2 DEGREES
VENTRICULAR RATE: 82 BPM

## 2025-07-02 PROCEDURE — 99284 EMERGENCY DEPT VISIT MOD MDM: CPT | Performed by: EMERGENCY MEDICINE

## 2025-07-02 PROCEDURE — 93005 ELECTROCARDIOGRAM TRACING: CPT

## 2025-07-02 PROCEDURE — 70450 CT HEAD/BRAIN W/O DYE: CPT

## 2025-07-02 PROCEDURE — 99284 EMERGENCY DEPT VISIT MOD MDM: CPT

## 2025-07-02 PROCEDURE — 93010 ELECTROCARDIOGRAM REPORT: CPT | Performed by: INTERNAL MEDICINE

## 2025-07-02 PROCEDURE — 72125 CT NECK SPINE W/O DYE: CPT

## 2025-07-02 NOTE — ED PROVIDER NOTES
Time reflects when diagnosis was documented in both MDM as applicable and the Disposition within this note       Time User Action Codes Description Comment    7/2/2025  6:22 AM Leeannalois Obed Add [W19.XXXA] Fall, initial encounter     7/2/2025  6:22 AM Leeannalois Obed Add [S09.90XA] Closed head injury, initial encounter     7/2/2025  6:22 AM Obed Washington Add [S02.2XXA] Nasal bone fracture     7/2/2025  6:28 AM Obed Washington [I48.91] A-fib (HCC)           ED Disposition       ED Disposition   Discharge    Condition   Stable    Date/Time   Wed Jul 2, 2025  6:22 AM    Comment   Lena Bro discharge to home/self care.                   Assessment & Plan       Medical Decision Making  Patient is a 89-year-old female presenting for mechanical fall.    Differential includes but not limited to intracranial injury, facial fracture, C-spine injury.  CT head and C-spine.  CT shows nasal bone fracture and blood products in the sinuses which was there in previous CT scans.  CT also concerning for nonacute stroke that happened since previous CT scan.    Patient cleared for discharge with PCP follow-up and return precautions.    Amount and/or Complexity of Data Reviewed  Radiology: ordered.        ED Course as of 07/03/25 0730   Wed Jul 02, 2025 0433 Review of patient's medication list from facility does not have Eliquis on it.         Medications - No data to display    ED Risk Strat Scores                    No data recorded        SBIRT 22yo+      Flowsheet Row Most Recent Value   Initial Alcohol Screen: US AUDIT-C     1. How often do you have a drink containing alcohol? 0 Filed at: 07/02/2025 0434   2. How many drinks containing alcohol do you have on a typical day you are drinking?  0 Filed at: 07/02/2025 0434   3a. Male UNDER 65: How often do you have five or more drinks on one occasion? 0 Filed at: 07/02/2025 0434   3b. FEMALE Any Age, or MALE 65+: How often do you have 4 or more drinks on one occassion? 0  Filed at: 07/02/2025 0434   Audit-C Score 0 Filed at: 07/02/2025 0434   CLAUDE: How many times in the past year have you...    Used an illegal drug or used a prescription medication for non-medical reasons? Never Filed at: 07/02/2025 0434                            History of Present Illness       Chief Complaint   Patient presents with    Fall     Pt was walking to bathroom without her walker and fell and struck her head. Bleeding from nose. Not on thinners.GCS 14 upon arrival.       Past Medical History[1]   Past Surgical History[2]   Family History[3]   Social History[4]   E-Cigarette/Vaping    E-Cigarette Use Never User       E-Cigarette/Vaping Substances    Nicotine No     THC No     CBD No     Flavoring No     Other No     Unknown No       I have reviewed and agree with the history as documented.     Patient is a 89-year-old female presenting from facility for mechanical fall.  Patient tripped, falling forward and hitting her face.  Patient has no complaints at this time.  Reviewing patient's records from facility shows that she does not take antiplatelet/anticoagulation medications.  Patient denies having symptoms prior to falling.  She denies lightheadedness, dizziness, chest pain, shortness of breath, numbness, tingling, or weakness.        Review of Systems        Objective       ED Triage Vitals   Temperature Pulse Blood Pressure Respirations SpO2 Patient Position - Orthostatic VS   07/02/25 0427 07/02/25 0426 07/02/25 0426 07/02/25 0533 07/02/25 0427 07/02/25 0426   (!) 97.3 °F (36.3 °C) 90 101/54 22 96 % Lying      Temp Source Heart Rate Source BP Location FiO2 (%) Pain Score    07/02/25 0427 07/02/25 0426 07/02/25 0426 -- 07/02/25 0427    Oral Monitor Right arm  No Pain      Vitals      Date and Time Temp Pulse SpO2 Resp BP Pain Score FACES Pain Rating User   07/02/25 0900 -- 86 94 % 20 147/67 -- --    07/02/25 0700 -- 84 92 % 20 135/59 -- --    07/02/25 0538 -- -- -- -- -- No Pain --    07/02/25  0533 -- 81 93 % 22 146/71 No Pain 0    07/02/25 0427 97.3 °F (36.3 °C) -- 96 % -- -- No Pain --    07/02/25 0426 -- 90 -- -- 101/54 -- --             Physical Exam  Vitals and nursing note reviewed.   Constitutional:       General: She is not in acute distress.     Appearance: Normal appearance. She is not ill-appearing, toxic-appearing or diaphoretic.   HENT:      Head: Normocephalic.      Comments: Swelling/mild deformation of nose.  No septal hematoma seen.  External abrasion on nasal bridge.     Mouth/Throat:      Mouth: Mucous membranes are moist.      Pharynx: Oropharynx is clear.     Eyes:      Extraocular Movements: Extraocular movements intact.      Pupils: Pupils are equal, round, and reactive to light.       Cardiovascular:      Rate and Rhythm: Normal rate.   Pulmonary:      Effort: Pulmonary effort is normal. No respiratory distress.   Abdominal:      General: Abdomen is flat.      Palpations: Abdomen is soft.      Tenderness: There is no abdominal tenderness.     Musculoskeletal:         General: No swelling, tenderness or deformity. Normal range of motion.      Cervical back: Normal range of motion and neck supple. No tenderness.     Skin:     General: Skin is warm and dry.     Neurological:      General: No focal deficit present.      Mental Status: She is alert and oriented to person, place, and time.      Cranial Nerves: No cranial nerve deficit.      Sensory: No sensory deficit.      Motor: No weakness.         Results Reviewed       None            CT head without contrast   Final Interpretation by Oswald Oseguera MD (07/02 0611)      Since March 9, 2025:   1.  New nasal bone fracture. No nasal bone fractures reported on the April 10, 2025 CT performed at [facility]. Large amount of blood in the right maxillary sinus; large amount of blood was reported in the right maxillary sinus on the April    10, 2025 CT performed at [facility].   2.  Right frontal lobe infarct  is new since March 9, 2025. No infarct is reported on the April 10, 2025 head CT performed at [facility]. Assessment for focal deficits is advised. No intracranial hemorrhage.         The study was marked in EPIC for immediate notification.         Workstation performed: [id number]         CT spine cervical without contrast   Final Interpretation by Oswald Oseguera MD (07/02 0603)      No cervical spine fracture or traumatic malalignment.                  Workstation performed: [id number]             Procedures    ED Medication and Procedure Management   Prior to Admission Medications   Prescriptions Last Dose Informant Patient Reported? Taking?   Alphagan P 0.1 %   Yes No   Sig: Administer 1 drop to both eyes daily   B Complex Vitamins (Vitamin B Complex) TABS   Yes No   Sig: daily   CALCIUM PO   Yes No   Sig: Take by mouth   Patient not taking: Reported on 5/18/2022   DULoxetine (Cymbalta) 60 mg delayed release capsule   No No   Sig: Take 1 capsule (60 mg total) by mouth daily   Eliquis 2.5 MG   No No   Sig: TAKE 1 TABLET (2.5 MG TOTAL) BY MOUTH 2 (TWO) TIMES A DAY   Incontinence Supply Disposable (Incontinence Brief Large) MISC   No No   Sig: Use 6 (six) times a day   Potassium Chloride ER 20 MEQ TBCR   Yes No   Sig: daily   Vitamin D High Potency 25 MCG (1000 UT) capsule   No No   Sig: GIVE 1 CAP BY MOUTH EVERY DAY DX: SUPPLEMENT   acetaminophen (TYLENOL) 325 mg tablet   Yes No   albuterol (Ventolin HFA) 90 mcg/act inhaler   No No   Sig: Inhale 2 puffs every 4 (four) hours as needed for wheezing Patient may keep in room with her   brimonidine tartrate 0.2 % ophthalmic solution   Yes No   Sig: Administer 1 drop to both eyes 2 (two) times a day   busPIRone (BUSPAR) 7.5 mg tablet   No No   Sig: TAKE 1 TABLET (7.5 MG TOTAL) BY MOUTH 2 (TWO) TIMES A DAY   citalopram (CeleXA) 10 mg tablet   Yes No   Sig: 10 mg daily   fluticasone-salmeterol (ADVAIR, WIXELA) 250-50 mcg/dose inhaler   No No   Sig: Inhale 1  puff 2 (two) times a day Rinse mouth after use.   latanoprost (XALATAN) 0.005 % ophthalmic solution   Yes No   Sig: Administer 1 drop to both eyes daily at bedtime     levothyroxine 50 mcg tablet   No No   Sig: Take 1 tablet (50 mcg total) by mouth daily   lisinopril (ZESTRIL) 20 mg tablet   No No   Sig: Take 1 tablet (20 mg total) by mouth daily   loratadine (CLARITIN) 10 mg tablet   No No   Sig: Take 1 tablet (10 mg total) by mouth daily   metoprolol tartrate (LOPRESSOR) 25 mg tablet   Yes No   rosuvastatin (CRESTOR) 10 MG tablet   Yes No   Sig: 10 mg daily Once at bedtime for hyperlipidemia.   sodium chloride (AYR SALINE NASAL) nasal gel   Yes No   Si Application into each nostril every hour as needed Apply to nostrils BID   sodium chloride (Deep Sea Nasal Spray) 0.65 % nasal spray   Yes No   Si spray into each nostril as needed for congestion   torsemide (DEMADEX) 10 mg tablet   No No   Sig: TAKE 1 TABLET (10 MG TOTAL) BY MOUTH DAILY   Patient taking differently: Take 20 mg by mouth daily Take 1.5 tablets  (30mg) daily      Facility-Administered Medications: None     Discharge Medication List as of 2025  6:29 AM        CONTINUE these medications which have NOT CHANGED    Details   acetaminophen (TYLENOL) 325 mg tablet Starting Mon 2023, Historical Med      albuterol (Ventolin HFA) 90 mcg/act inhaler Inhale 2 puffs every 4 (four) hours as needed for wheezing Patient may keep in room with her, Starting Wed 2023, Normal      Alphagan P 0.1 % Administer 1 drop to both eyes daily, Starting Thu 2021, Historical Med      B Complex Vitamins (Vitamin B Complex) TABS daily, Historical Med      brimonidine tartrate 0.2 % ophthalmic solution Administer 1 drop to both eyes 2 (two) times a day, Historical Med      busPIRone (BUSPAR) 7.5 mg tablet TAKE 1 TABLET (7.5 MG TOTAL) BY MOUTH 2 (TWO) TIMES A DAY, Starting Tue 3/7/2023, Normal      CALCIUM PO Take by mouth, Historical Med      citalopram  (CeleXA) 10 mg tablet 10 mg daily, Starting Sun 5/7/2023, Historical Med      DULoxetine (Cymbalta) 60 mg delayed release capsule Take 1 capsule (60 mg total) by mouth daily, Starting Wed 8/24/2022, Normal      Eliquis 2.5 MG TAKE 1 TABLET (2.5 MG TOTAL) BY MOUTH 2 (TWO) TIMES A DAY, Normal      fluticasone-salmeterol (ADVAIR, WIXELA) 250-50 mcg/dose inhaler Inhale 1 puff 2 (two) times a day Rinse mouth after use., Starting Fri 9/4/2020, Normal      Incontinence Supply Disposable (Incontinence Brief Large) MISC Use 6 (six) times a day, Starting Tue 3/19/2024, Print      latanoprost (XALATAN) 0.005 % ophthalmic solution Administer 1 drop to both eyes daily at bedtime  , Historical Med      levothyroxine 50 mcg tablet Take 1 tablet (50 mcg total) by mouth daily, Starting Fri 9/4/2020, Normal      lisinopril (ZESTRIL) 20 mg tablet Take 1 tablet (20 mg total) by mouth daily, Starting Fri 9/4/2020, Normal      loratadine (CLARITIN) 10 mg tablet Take 1 tablet (10 mg total) by mouth daily, Starting Thu 11/17/2022, Normal      metoprolol tartrate (LOPRESSOR) 25 mg tablet Starting Sun 4/24/2022, Historical Med      Potassium Chloride ER 20 MEQ TBCR daily, Historical Med      rosuvastatin (CRESTOR) 10 MG tablet 10 mg daily Once at bedtime for hyperlipidemia., Starting Mon 4/18/2022, Historical Med      sodium chloride (AYR SALINE NASAL) nasal gel 1 Application into each nostril every hour as needed Apply to nostrils BID, Historical Med      sodium chloride (Deep Sea Nasal Spray) 0.65 % nasal spray 1 spray into each nostril as needed for congestion, Historical Med      torsemide (DEMADEX) 10 mg tablet TAKE 1 TABLET (10 MG TOTAL) BY MOUTH DAILY, Starting Wed 9/22/2021, Normal      Vitamin D High Potency 25 MCG (1000 UT) capsule GIVE 1 CAP BY MOUTH EVERY DAY DX: SUPPLEMENT, Normal           No discharge procedures on file.  ED SEPSIS DOCUMENTATION   Time reflects when diagnosis was documented in both MDM as applicable and the  Disposition within this note       Time User Action Codes Description Comment    7/2/2025  6:22 AM Obed Washington Add [W19.XXXA] Fall, initial encounter     7/2/2025  6:22 AM Obed Washington Add [S09.90XA] Closed head injury, initial encounter     7/2/2025  6:22 AM Obed Washington Add [S02.2XXA] Nasal bone fracture     7/2/2025  6:28 AM Obed Washington Add [I48.91] A-fib (HCC)                      [1]   Past Medical History:  Diagnosis Date    Anxiety     Arthritis     OA    Asthma     good control    Burn     Chronic pain disorder     spinal stenosis    GERD (gastroesophageal reflux disease)     off and on    Glaucoma     Hiatal hernia     HLD (hyperlipidemia)     Hx of fall 2015    Hypertension     PVD (peripheral vascular disease) (HCC)     Thyroid disease     hypo   [2]   Past Surgical History:  Procedure Laterality Date    ANGIOPLASTY      jacquelyn lower legs    BLADDER SUSPENSION      with mesh    CATARACT EXTRACTION Bilateral     COLONOSCOPY      COLONOSCOPY W/ POLYPECTOMY      via Colostomy; Pt does not have colostomy - no listing for a polypectomy of colon per [employer]    DILATION AND CURETTAGE OF UTERUS      NASAL POLYP EXCISION      Nasal Endoscopy Polypectomy    OTHER SURGICAL HISTORY  04/2007    Uterine prolapse and repair     [place] NEUROPLASTY &/TRANSPOS MEDIAN NRV CARPAL TUNNE Left 1/10/2019    Procedure: RELEASE LEFT CARPAL TUNNEL;  Surgeon: Yogesh Mojica MD;  Location:  MAIN OR;  Service: Orthopedics   [3]   Family History  Problem Relation Name Age of Onset    Other Family          Back Pain     Hypertension Family      Thyroid disease Family          Disorder   [4]   Social History  Tobacco Use    Smoking status: Never    Smokeless tobacco: Never   Vaping Use    Vaping status: Never Used   Substance Use Topics    Alcohol use: Yes     Alcohol/week: 1.0 standard drink of alcohol     Types: 1 Glasses of wine per week     Comment: rare    Drug use: No        Obed Washington MD  07/03/25 6623

## 2025-07-02 NOTE — ED ATTENDING ATTESTATION
7/2/2025  IAnand MD, saw and evaluated the patient. I have discussed the patient with the resident/non-physician practitioner and agree with the resident's/non-physician practitioner's findings, Plan of Care, and MDM as documented in the resident's/non-physician practitioner's note, except where noted. All available labs and Radiology studies were reviewed.  I was present for key portions of any procedure(s) performed by the resident/non-physician practitioner and I was immediately available to provide assistance.       At this point I agree with the current assessment done in the Emergency Department.  I have conducted an independent evaluation of this patient a history and physical is as follows:      Final Diagnosis:  1. Fall, initial encounter    2. Closed head injury, initial encounter    3. Nasal bone fracture    4. A-fib ([facility])      Chief Complaint   Patient presents with    Fall     Pt was walking to bathroom without her walker and fell and struck her head. Bleeding from nose. Not on thinners.GCS 14 upon arrival.           A:  - 89-year-old female who presents after mechanical fall.      P:  - CT head and C-spine to evaluate for traumatic injury.      H:    89-year-old female who presents from the nursing home with mechanical fall.  States that she tripped and fell forward.  Struck her head, no loss of consciousness.  Not on blood thinners.      PMH:  Past Medical History[1]    PSH:  Past Surgical History[2]      PE:   Vitals:    07/02/25 0426 07/02/25 0427 07/02/25 0533   BP: 101/54  146/71   BP Location: Right arm  Right arm   Pulse: 90  81   Resp:   22   Temp:  (!) 97.3 °F (36.3 °C)    TempSrc:  Oral    SpO2:  96% 93%         Constitutional: Vital signs are normal. She appears well-developed. She is cooperative. No distress.   Head: Abrasions over nose.  Neck: No C-spine tenderness.  Nose: Dried blood right nare.  No septal hematoma bilaterally.  Cardiovascular: Normal rate, regular  rhythm.  Pulmonary/Chest: Effort normal.  No chest wall tenderness.  Abdominal: Soft. Normal appearance.  Nontender.  Neurological: She is alert.   MSK: Able to range all joints without pain.  Ranged hips without pain.  Skin: Skin is warm, dry and intact.           - 13 point ROS was performed and all are normal unless stated in the history above.   - Nursing note reviewed. Vitals reviewed.   - Orders placed by myself and/or advanced practitioner / resident.    - Previous chart was reviewed  - No language barrier.   - History obtained from patient.   - There are no limitations to the history obtained. Reasons ROS could not be obtained:  N/A         Medications - No data to display  CT head without contrast   Final Result      Since March 9, 2025:   1.  New nasal bone fracture. No nasal bone fractures reported on the April 10, 2025 CT performed at [facility]. Large amount of blood in the right maxillary sinus; large amount of blood was reported in the right maxillary sinus on the April    10, 2025 CT performed at [facility].   2.  Right frontal lobe infarct is new since March 9, 2025. No infarct is reported on the April 10, 2025 head CT performed at [facility]. Assessment for focal deficits is advised. No intracranial hemorrhage.         The study was marked in EPIC for immediate notification.         Workstation performed: [id number]         CT spine cervical without contrast   Final Result      No cervical spine fracture or traumatic malalignment.                  Workstation performed: [id number]           Orders Placed This Encounter   Procedures    CT head without contrast    CT spine cervical without contrast     Labs Reviewed - No data to display  Time reflects when diagnosis was documented in both MDM as applicable and the Disposition within this note       Time User Action Codes Description Comment    7/2/2025  6:22 AM Obed Washington Add [W19.XXXA] Fall, initial encounter      7/2/2025  6:22 AM Obed aWshington Add [S09.90XA] Closed head injury, initial encounter     7/2/2025  6:22 AM Obed Washington Add [S02.2XXA] Nasal bone fracture     7/2/2025  6:28 AM Obed Washington Add [I48.91] A-fib (HCC)           ED Disposition       ED Disposition   Discharge    Condition   Stable    Date/Time   Wed Jul 2, 2025  6:22 AM    Comment   Lena Crabtree discharge to home/self care.                   Follow-up Information       Follow up With Specialties Details Why Contact Info Additional Information    Barrington Toledo DO Family Medicine   [address]  [address]  [place] [address]  [phone]       [facility] Emergency Medicine   [address] [address] [phone] [facility], [address] [place] [address], [address] [phone]          Patient's Medications   Discharge Prescriptions    No medications on file     No discharge procedures on file.  Prior to Admission Medications   Prescriptions Last Dose Informant Patient Reported? Taking?   Alphagan P 0.1 %   Yes No   Sig: Administer 1 drop to both eyes daily   B Complex Vitamins (Vitamin B Complex) TABS   Yes No   Sig: daily   CALCIUM PO   Yes No   Sig: Take by mouth   Patient not taking: Reported on 5/18/2022   DULoxetine (Cymbalta) 60 mg delayed release capsule   No No   Sig: Take 1 capsule (60 mg total) by mouth daily   Eliquis 2.5 MG   No No   Sig: TAKE 1 TABLET (2.5 MG TOTAL) BY MOUTH 2 (TWO) TIMES A DAY   Incontinence Supply Disposable (Incontinence Brief Large) MISC   No No   Sig: Use 6 (six) times a day   Potassium Chloride ER 20 MEQ TBCR   Yes No   Sig: daily   Vitamin D High Potency 25 MCG (1000 UT) capsule   No No   Sig: GIVE 1 CAP BY MOUTH EVERY DAY DX: SUPPLEMENT   acetaminophen (TYLENOL) 325 mg tablet   Yes No   albuterol (Ventolin HFA) 90 mcg/act inhaler   No No   Sig: Inhale 2 puffs every 4 (four) hours as  "needed for wheezing Patient may keep in room with her   brimonidine tartrate 0.2 % ophthalmic solution   Yes No   Sig: Administer 1 drop to both eyes 2 (two) times a day   busPIRone (BUSPAR) 7.5 mg tablet   No No   Sig: TAKE 1 TABLET (7.5 MG TOTAL) BY MOUTH 2 (TWO) TIMES A DAY   citalopram (CeleXA) 10 mg tablet   Yes No   Sig: 10 mg daily   fluticasone-salmeterol (ADVAIR, WIXELA) 250-50 mcg/dose inhaler   No No   Sig: Inhale 1 puff 2 (two) times a day Rinse mouth after use.   latanoprost (XALATAN) 0.005 % ophthalmic solution   Yes No   Sig: Administer 1 drop to both eyes daily at bedtime     levothyroxine 50 mcg tablet   No No   Sig: Take 1 tablet (50 mcg total) by mouth daily   lisinopril (ZESTRIL) 20 mg tablet   No No   Sig: Take 1 tablet (20 mg total) by mouth daily   loratadine (CLARITIN) 10 mg tablet   No No   Sig: Take 1 tablet (10 mg total) by mouth daily   metoprolol tartrate (LOPRESSOR) 25 mg tablet   Yes No   rosuvastatin (CRESTOR) 10 MG tablet   Yes No   Sig: 10 mg daily Once at bedtime for hyperlipidemia.   sodium chloride (AYR SALINE NASAL) nasal gel   Yes No   Si Application into each nostril every hour as needed Apply to nostrils BID   sodium chloride (Deep Sea Nasal Spray) 0.65 % nasal spray   Yes No   Si spray into each nostril as needed for congestion   torsemide (DEMADEX) 10 mg tablet   No No   Sig: TAKE 1 TABLET (10 MG TOTAL) BY MOUTH DAILY   Patient taking differently: Take 20 mg by mouth daily Take 1.5 tablets  (30mg) daily      Facility-Administered Medications: None       Portions of the record may have been created with voice recognition software. Occasional wrong word or \"sound a like\" substitutions may have occurred due to the inherent limitations of voice recognition software. Read the chart carefully and recognize, using context, where substitutions have occurred.         ED Course         Critical Care Time  Procedures           [1]   Past Medical History:  Diagnosis Date    " Anxiety     Arthritis     OA    Asthma     good control    Burn     Chronic pain disorder     spinal stenosis    GERD (gastroesophageal reflux disease)     off and on    Glaucoma     Hiatal hernia     HLD (hyperlipidemia)     Hx of fall 2015    Hypertension     PVD (peripheral vascular disease) (HCC)     Thyroid disease     hypo   [2]   Past Surgical History:  Procedure Laterality Date    ANGIOPLASTY      jacquelyn lower legs    BLADDER SUSPENSION      with mesh    CATARACT EXTRACTION Bilateral     COLONOSCOPY      COLONOSCOPY W/ POLYPECTOMY      via Colostomy; Pt does not have colostomy - no listing for a polypectomy of colon per [employer]    DILATION AND CURETTAGE OF UTERUS      NASAL POLYP EXCISION      Nasal Endoscopy Polypectomy    OTHER SURGICAL HISTORY  04/2007    Uterine prolapse and repair     [place] NEUROPLASTY &/TRANSPOS MEDIAN NRV CARPAL TUNNE Left 1/10/2019    Procedure: RELEASE LEFT CARPAL TUNNEL;  Surgeon: Yogesh Mojica MD;  Location:  MAIN OR;  Service: Orthopedics

## 2025-07-02 NOTE — DISCHARGE INSTRUCTIONS
Please follow-up with primary care provider regarding nasal bone fracture.  Please return to the ED with new or worsening symptoms-see attached.    CT scan showed concern for new stroke since previous imaging in March 2025.  Risk/benefit of being on anticoagulation medication should be discussed with primary care provider.

## 2025-07-02 NOTE — ED NOTES
Attempt to call Tameka Bill regarding patient coming back, no answer, message left.      Sis Reich RN  07/02/25 0949

## (undated) DEVICE — SYRINGE 30ML LL

## (undated) DEVICE — INTENDED FOR TISSUE SEPARATION, AND OTHER PROCEDURES THAT REQUIRE A SHARP SURGICAL BLADE TO PUNCTURE OR CUT.: Brand: BARD-PARKER SAFETY BLADES SIZE 15, STERILE

## (undated) DEVICE — BETHLEHEM UNIVERSAL  MIONR EXT: Brand: CARDINAL HEALTH

## (undated) DEVICE — STERILE POLYISOPRENE POWDER-FREE SURGICAL GLOVES: Brand: PROTEXIS

## (undated) DEVICE — ACE WRAP 3 IN STERILE

## (undated) DEVICE — GAUZE SPONGES,16 PLY: Brand: CURITY

## (undated) DEVICE — STERILE POLYISOPRENE POWDER-FREE SURGICAL GLOVES WITH EMOLLIENT COATING: Brand: PROTEXIS

## (undated) DEVICE — SMARTGOWN SURGICAL GOWN, LARGE: Brand: CONVERTORS

## (undated) DEVICE — NEEDLE 25G X 5/8 SAFETY

## (undated) DEVICE — STOCKINETTE 2P PREROLLD 4X48

## (undated) DEVICE — CURITY NON-ADHERENT STRIPS: Brand: CURITY

## (undated) DEVICE — CABLE BIPOLAR DISP MEGADYNE

## (undated) DEVICE — NEEDLE BLUNT 18 G X 1 1/2IN

## (undated) DEVICE — KERLIX BANDAGE ROLL: Brand: KERLIX

## (undated) DEVICE — CUFF TOURNIQUET DISP SZ18

## (undated) DEVICE — ALCON SURGICAL BLADE 64: Brand: ALCON

## (undated) DEVICE — CHLORAPREP HI-LITE 26ML ORANGE

## (undated) DEVICE — ACE WRAP 4 IN STERILE

## (undated) DEVICE — GAUZE ROLL,3 PLY: Brand: DERMACEA

## (undated) DEVICE — SUT PROLENE 4-0 PS-2 18 IN 8682G

## (undated) DEVICE — 3M™ MICROFOAM™ SURGICAL TAPE 4 ROLLS/CARTON 6 CARTONS/CASE 1528-3: Brand: 3M™ MICROFOAM™

## (undated) DEVICE — INTENDED FOR TISSUE SEPARATION, AND OTHER PROCEDURES THAT REQUIRE A SHARP SURGICAL BLADE TO PUNCTURE OR CUT.: Brand: BARD-PARKER ® SAFETYLOCK CARBON RIB-BACK BLADES